# Patient Record
Sex: MALE | Employment: FULL TIME | URBAN - METROPOLITAN AREA
[De-identification: names, ages, dates, MRNs, and addresses within clinical notes are randomized per-mention and may not be internally consistent; named-entity substitution may affect disease eponyms.]

---

## 2017-10-03 ENCOUNTER — TRANSFERRED RECORDS (OUTPATIENT)
Dept: HEALTH INFORMATION MANAGEMENT | Facility: CLINIC | Age: 57
End: 2017-10-03

## 2017-10-18 ENCOUNTER — MEDICAL CORRESPONDENCE (OUTPATIENT)
Dept: HEALTH INFORMATION MANAGEMENT | Facility: CLINIC | Age: 57
End: 2017-10-18

## 2017-10-18 ENCOUNTER — TRANSFERRED RECORDS (OUTPATIENT)
Dept: HEALTH INFORMATION MANAGEMENT | Facility: CLINIC | Age: 57
End: 2017-10-18

## 2017-11-06 ENCOUNTER — OFFICE VISIT (OUTPATIENT)
Dept: NEUROLOGY | Facility: CLINIC | Age: 57
End: 2017-11-06

## 2017-11-06 VITALS
HEIGHT: 67 IN | BODY MASS INDEX: 22.41 KG/M2 | SYSTOLIC BLOOD PRESSURE: 127 MMHG | HEART RATE: 72 BPM | DIASTOLIC BLOOD PRESSURE: 74 MMHG | WEIGHT: 142.8 LBS

## 2017-11-06 DIAGNOSIS — H49.9 OPHTHALMOPLEGIA: Primary | ICD-10-CM

## 2017-11-06 PROCEDURE — 83516 IMMUNOASSAY NONANTIBODY: CPT | Performed by: PSYCHIATRY & NEUROLOGY

## 2017-11-06 PROCEDURE — 83519 RIA NONANTIBODY: CPT | Mod: 91 | Performed by: PSYCHIATRY & NEUROLOGY

## 2017-11-06 PROCEDURE — 84443 ASSAY THYROID STIM HORMONE: CPT | Performed by: PSYCHIATRY & NEUROLOGY

## 2017-11-06 PROCEDURE — 84999 UNLISTED CHEMISTRY PROCEDURE: CPT | Mod: 91 | Performed by: PSYCHIATRY & NEUROLOGY

## 2017-11-06 PROCEDURE — 83519 RIA NONANTIBODY: CPT | Performed by: PSYCHIATRY & NEUROLOGY

## 2017-11-06 PROCEDURE — 86255 FLUORESCENT ANTIBODY SCREEN: CPT | Performed by: PSYCHIATRY & NEUROLOGY

## 2017-11-06 RX ORDER — PYRIDOSTIGMINE BROMIDE 60 MG/1
60 TABLET ORAL SEE ADMIN INSTRUCTIONS
Qty: 90 TABLET | Refills: 1 | Status: SHIPPED | OUTPATIENT
Start: 2017-11-06 | End: 2017-12-12

## 2017-11-06 NOTE — LETTER
11/6/2017       RE: David Vera  [address] [place] [address]     Dear Colleague,    Thank you for referring your patient, David Vera, to the [facility]UR[facility] at [facility]. Please see a copy of my visit note below.    REASON FOR VISIT:   This patient is a 57-year-old right-handed man seen at the request of Dr. Daily for neurologic consultation with chief complaint of bilateral ptosis and vision problems.        HISTORY OF PRESENT ILLNESS:   He reports this is the third episode he has had of these types of symptoms.  The first episode occurred 20 years ago.  He had sinus problems with a droopy right lid.  After sinus surgery, the symptom went away.  Three years ago, he again had droopiness of the right eyelid.  It was felt that he had sinus problems.  He was put on antibiotics and the symptom went away.  The current symptoms began in August.  He had drooping of the right eyelid and then the left eyelid.  He also gets occasional double vision.  He did take Sudafed for a couple of weeks and had a good response but did not care to keep taking the Sudafed because it keeps him up at night.  Now, he has double vision and difficulty seeing because of the droopy eyelids.  He has double vision if he looks right or left.  If he holds his eyes open and looks straight ahead, his vision is fine.  If he uses a cover uncover test, he has no complaints with regard to his vision other than the ptosis.  He has no problem with speech or swallowing or bowel or bladder control.  He has good strength in his arms and legs.  Yesterday he biked 20 miles on his stationary bike in an hour and 15 minutes.  He has no problem coughing.  The symptoms do worsen as the day goes on.      PAST MEDICAL HISTORY:  Largely noncontributory.  He does not have high blood pressure, diabetes, thyroid or asthma.  He has not had pertinent surgery or trauma to the head or neck.  He is not  taking any medications.  He does not drink or smoke.      SOCIAL HISTORY:  He is a .  He is unmarried with 2 children.      FAMILY HISTORY:  Positive for heart disease.      PHYSICAL EXAMINATION:   GENERAL:  The patient is cooperative and in no distress.   VITAL SIGNS:  His blood pressure is 127/74.     There are no carotid bruits but auscultation of the heart shows S1, S2, without murmur, rub or gallop.  Chest is clear to auscultation.  He has excellent cough.     NEUROLOGIC:   The patient is alert, oriented and lucid.  Cranial nerve testing shows full visual fields to confrontation.  Funduscopic exam shows sharp discs bilaterally.  Visual acuity is 20/20 in the right eye and 20/25 in the left eye.  He has almost complete ophthalmoplegia with only minimal horizontal and vertical eye movements.  He has obvious ptosis, right greater than left.  Pupils react to light.  He has excellent strength in the facial muscles.  Palate elevates in the midline and tongue protrudes in the midline.  Motor evaluation shows no pronator drift, normal finger tapping, finger-nose-finger and heel-knee-shin.  He has good strength in the arms and legs.  Muscle stretch reflexes are normal amplitude and symmetric.  Toes are downgoing.  Sensory exam shows preserved vibration and temperature.  Romberg sign is absent.  He can walk on his heels, toes and tandem.      He did have an MRI scan of the brain and orbits at [place].  The report indicates that the intraorbital tissues are normal as well as the brain.  There is a mucus retention cyst in the left frontal sinus.  There is hazy enhancement of the central kelsey consistent with benign capillary telangiectasia.  There was minor gliotic change in the white matter.  He also had lab work performed.  Myasthenia gravis panel was obtained.  However, the results are uninterpretable.  The receptor binding antibody was negative.  The modulating antibody was inconclusive.  The  striational antibody was negative.  This summary report is then it was unable to complete testing due to insufficient quantity or interfering substances and a follow-up test was recommended.      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      DISCUSSION:  This patient is seen for evaluation of bilateral ptosis and also ophthalmoparesis.  He has had 3 such events in the last 20 years and this event has been going on since August.  His exam shows almost complete ophthalmoparesis with obvious ptosis.  However, visual acuity is normal.  Symptoms worsen as the day goes on.  This suggests ocular myasthenia gravis.  I am going to recheck his antibody panel for acetylcholine receptor antibodies.  The original one was inconclusive.  I also have sent the tests requested by the [facility] as a follow-up.  I am going to check a thyroid panel.  I am starting him on Mestinon.  He does have an appointment to see Dr. Micah Higgins in about a month and hopefully that appointment can be moved up.  If his lab testing is inconclusive, he will need to see one of the neuromuscular doctors about further electrodiagnostic testing.  I will hold off on ordering a CT chest to evaluate for thymoma until the lab work comes back.  I did make an appointment to see him in followup in a month pending his other workup.      Ney Salinas MD         D: 2017 15:42   T: 2017 16:16   MT: IVELISSE      Name:     JONNIE CHUN   MRN:      [id number]        Account:      [id number]   :      1960           Service Date: 2017      Document: [id number]       Again, thank you for allowing me to participate in the care of your patient.      Sincerely,    Ney Salinas MD    cc:   Ney Daily MD   [facility]    [address] [place] [address], [address]   [place] [address]

## 2017-11-06 NOTE — PROGRESS NOTES
REASON FOR VISIT:   This patient is a 57-year-old right-handed man seen at the request of Dr. Daily for neurologic consultation with chief complaint of bilateral ptosis and vision problems.        HISTORY OF PRESENT ILLNESS:   He reports this is the third episode he has had of these types of symptoms.  The first episode occurred 20 years ago.  He had sinus problems with a droopy right lid.  After sinus surgery, the symptom went away.  Three years ago, he again had droopiness of the right eyelid.  It was felt that he had sinus problems.  He was put on antibiotics and the symptom went away.  The current symptoms began in August.  He had drooping of the right eyelid and then the left eyelid.  He also gets occasional double vision.  He did take Sudafed for a couple of weeks and had a good response but did not care to keep taking the Sudafed because it keeps him up at night.  Now, he has double vision and difficulty seeing because of the droopy eyelids.  He has double vision if he looks right or left.  If he holds his eyes open and looks straight ahead, his vision is fine.  If he uses a cover uncover test, he has no complaints with regard to his vision other than the ptosis.  He has no problem with speech or swallowing or bowel or bladder control.  He has good strength in his arms and legs.  Yesterday he biked 20 miles on his stationary bike in an hour and 15 minutes.  He has no problem coughing.  The symptoms do worsen as the day goes on.      PAST MEDICAL HISTORY:  Largely noncontributory.  He does not have high blood pressure, diabetes, thyroid or asthma.  He has not had pertinent surgery or trauma to the head or neck.  He is not taking any medications.  He does not drink or smoke.      SOCIAL HISTORY:  He is a .  He is unmarried with 2 children.      FAMILY HISTORY:  Positive for heart disease.      PHYSICAL EXAMINATION:   GENERAL:  The patient is cooperative and in no distress.   VITAL SIGNS:   His blood pressure is 127/74.     There are no carotid bruits but auscultation of the heart shows S1, S2, without murmur, rub or gallop.  Chest is clear to auscultation.  He has excellent cough.     NEUROLOGIC:   The patient is alert, oriented and lucid.  Cranial nerve testing shows full visual fields to confrontation.  Funduscopic exam shows sharp discs bilaterally.  Visual acuity is 20/20 in the right eye and 20/25 in the left eye.  He has almost complete ophthalmoplegia with only minimal horizontal and vertical eye movements.  He has obvious ptosis, right greater than left.  Pupils react to light.  He has excellent strength in the facial muscles.  Palate elevates in the midline and tongue protrudes in the midline.  Motor evaluation shows no pronator drift, normal finger tapping, finger-nose-finger and heel-knee-shin.  He has good strength in the arms and legs.  Muscle stretch reflexes are normal amplitude and symmetric.  Toes are downgoing.  Sensory exam shows preserved vibration and temperature.  Romberg sign is absent.  He can walk on his heels, toes and tandem.      He did have an MRI scan of the brain and orbits at [place].  The report indicates that the intraorbital tissues are normal as well as the brain.  There is a mucus retention cyst in the left frontal sinus.  There is hazy enhancement of the central kelsey consistent with benign capillary telangiectasia.  There was minor gliotic change in the white matter.  He also had lab work performed.  Myasthenia gravis panel was obtained.  However, the results are uninterpretable.  The receptor binding antibody was negative.  The modulating antibody was inconclusive.  The striational antibody was negative.  This summary report is then it was unable to complete testing due to insufficient quantity or interfering substances and a follow-up test was recommended.      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      DISCUSSION:  This  patient is seen for evaluation of bilateral ptosis and also ophthalmoparesis.  He has had 3 such events in the last 20 years and this event has been going on since August.  His exam shows almost complete ophthalmoparesis with obvious ptosis.  However, visual acuity is normal.  Symptoms worsen as the day goes on.  This suggests ocular myasthenia gravis.  I am going to recheck his antibody panel for acetylcholine receptor antibodies.  The original one was inconclusive.  I also have sent the tests requested by the [facility] as a follow-up.  I am going to check a thyroid panel.  I am starting him on Mestinon.  He does have an appointment to see Dr. Micah Higgins in about a month and hopefully that appointment can be moved up.  If his lab testing is inconclusive, he will need to see one of the neuromuscular doctors about further electrodiagnostic testing.  I will hold off on ordering a CT chest to evaluate for thymoma until the lab work comes back.  I did make an appointment to see him in followup in a month pending his other workup.      Amelia Madrid MD      cc:   Amelia Daily MD   [facility]    [address] [place] [address], [address]   [place] [address]         AMELIA MADRID MD             D: 2017 15:42   T: 2017 16:16   MT: IVELISSE      Name:     JONNIE CHUN   MRN:      [id number]        Account:      [id number]   :      1960           Service Date: 2017      Document: [id number]          11/10 addendum: reviewed labs with pt.  Ach R binding justine is positive.  Pt is improved on mestinon.  Will check CT chest.       addendum: reviewed CT chest with pt.  No thymoma and no evidence of active chest disease.  Okay to take two pyridostigmine at once.

## 2017-11-06 NOTE — MR AVS SNAPSHOT
After Visit Summary   11/6/2017    David Vera    MRN: [id number]           Patient Information     Date Of Birth          1960        Visit Information        Provider Department      11/6/2017 2:45 PM Ney Salinas MD [facility] NEUROSPECIALTIES        Today's Diagnoses     Ophthalmoplegia    -  1       Follow-ups after your visit        Follow-up notes from your care team     Return in about 4 weeks (around 12/4/2017).      Your next 10 appointments already scheduled     Dec 04, 2017  1:45 PM CST   Return Visit with Ney Salinas MD   [facility] NEUROSPECIALTIES ([facility])    [address]  [address]  [facility] [address]   [phone]            Dec 04, 2017  4:15 PM CST   (Arrive by 4:00 PM)   New Patient Visit with Carin Tiwari MD   [facility] ([facility])    [address]  4th [address]  [facility] [id number] [phone]            Dec 05, 2017  7:30 AM CST   New Adult Strabismus with Micah Higgins MD   [facility] ([facility])    [address]  [address]  [address]  [facility] [id number] [phone]              Who to contact     Please call your clinic at [phone] to:    Ask questions about your health    Make or cancel appointments    Discuss your medicines    Learn about your test results    Speak to your doctor   If you have compliments or concerns about an experience at your clinic, or if you wish to file a complaint, please contact [facility] Patient Relations at [phone] or email us at [email or web address]         Additional Information About Your Visit        MyChart Information     [employer] is an electronic gateway that provides easy, online access to your medical records. With BookingBug, you can request a clinic appointment, read your test results, renew a prescription or communicate with your care  "team.     To sign up for [employer] visit the website at [email or web address]   You will be asked to enter the access code listed below, as well as some personal information. Please follow the directions to create your username and password.     Your access code is: [email or web address]  Expires: 2018  3:29 PM     Your access code will  in 90 days. If you need help or a new code, please contact your [facility] or call [phone] for assistance.        Care EveryWhere ID     This is your Care EveryWhere ID. This could be used by other organizations to access your [place] medical records  [id number]        Your Vitals Were     Pulse Height BMI (Body Mass Index)             72 5' 7\" (170.2 cm) 22.37 kg/m2          Blood Pressure from Last 3 Encounters:   17 127/74    Weight from Last 3 Encounters:   17 142 lb 12.8 oz (64.8 kg)              We Performed the Following     ACETYLCHOLINE MODULATING ANTIBODY     ACETYLCHOLINE RECEPTOR BINDING     ACETYLCHOLINE RECEPTOR BLOCKING [facility] [address] neuroimmunology antibody f/u (serum): Laboratory Miscellaneous Order     STRIATED MUSCLE ANTIBODY IGG     TSH with free T4 reflex          Today's Medication Changes          These changes are accurate as of: 17  3:29 PM.  If you have any questions, ask your nurse or doctor.               Start taking these medicines.        Dose/Directions    pyridostigmine 60 MG tablet   Commonly known as:  MESTINON   Used for:  Ophthalmoplegia   Started by:  Ney Salinas MD        Dose:  60 mg   Take 1 tablet (60 mg) by mouth See Admin Instructions One po q day for 3 days, then one po bid for 3 days, then one po tid.   Quantity:  90 tablet   Refills:  1            Where to get your medicines      These medications were sent to [id number] - [place], [place] - [address]  [address] [facility] [place] [facility] [place] [address]     Phone:  [phone]     " pyridostigmine 60 MG tablet                Primary Care Provider Office Phone # Fax #    Jamarcus Montano [phone]       [facility] [address]  [facility] [address]        Equal Access to Services     RIZWAN NOVA : Hadii yaya ku hadkimo Soomaali, waaxda luqadaha, qaybta kaalmada adeegyada, brown del vallen jon baird laJosephyllis adams. So [facility] [phone].    ATENCIÓN: Si habla español, tiene a salas disposición servicios gratuitos de asistencia lingüística. Llame al [phone].    We comply with applicable federal civil rights laws and [place] laws. We do not discriminate on the basis of race, color, national origin, age, disability, sex, sexual orientation, or gender identity.            Thank you!     Thank you for choosing [facility]  for your care. Our goal is always to provide you with excellent care. Hearing back from our patients is one way we can continue to improve our services. Please take a few minutes to complete the written survey that you may receive in the mail after your visit with us. Thank you!             Your Updated Medication List - Protect others around you: Learn how to safely use, store and throw away your medicines at www.disposemymeds.org.          This list is accurate as of: 11/6/17  3:29 PM.  Always use your most recent med list.                   Brand Name Dispense Instructions for use Diagnosis    chloroquine 500 MG tablet    ARALEN    8    1 TAB PO Q WEEK start 1 week prior to malaria area and continue until 4 weeks after leaving malaria area        CIPRO 500 MG tablet   Generic drug:  ciprofloxacin     10    1 TAB PO BID prn traveler's diarrhea        pyridostigmine 60 MG tablet    MESTINON    90 tablet    Take 1 tablet (60 mg) by mouth See Admin Instructions One po q day for 3 days, then one po bid for 3 days, then one po tid.    Ophthalmoplegia

## 2017-11-07 LAB — TSH SERPL DL<=0.005 MIU/L-ACNC: 0.94 MU/L (ref 0.4–4)

## 2017-11-08 LAB — STRIA MUS IGG SER QL IF: NORMAL

## 2017-11-09 ENCOUNTER — OFFICE VISIT (OUTPATIENT)
Dept: OPHTHALMOLOGY | Facility: CLINIC | Age: 57
End: 2017-11-09
Attending: OPHTHALMOLOGY
Payer: COMMERCIAL

## 2017-11-09 DIAGNOSIS — H02.403 PTOSIS OF BOTH EYELIDS: Primary | ICD-10-CM

## 2017-11-09 DIAGNOSIS — H53.2 DIPLOPIA: ICD-10-CM

## 2017-11-09 DIAGNOSIS — H50.05 ALTERNATING ESOTROPIA: ICD-10-CM

## 2017-11-09 LAB
ACHR BIND AB SER-SCNC: 0.7 NMOL/L (ref 0–0.4)
ACHR MOD AB/ACHR TOTAL SFR SER: 8 %

## 2017-11-09 PROCEDURE — 99215 OFFICE O/P EST HI 40 MIN: CPT | Performed by: TECHNICIAN/TECHNOLOGIST

## 2017-11-09 PROCEDURE — 92060 SENSORIMOTOR EXAMINATION: CPT | Mod: ZF | Performed by: OPHTHALMOLOGY

## 2017-11-09 ASSESSMENT — VISUAL ACUITY
OD_SC: 20/25
METHOD: SNELLEN - LINEAR
OS_SC+: -2
OD_SC+: -2
OS_SC: 20/30

## 2017-11-09 ASSESSMENT — CONF VISUAL FIELD
OS_NORMAL: 1
OD_NORMAL: 1
METHOD: COUNTING FINGERS

## 2017-11-09 ASSESSMENT — TONOMETRY
IOP_METHOD: ICARE
OD_IOP_MMHG: 13
OS_IOP_MMHG: 14

## 2017-11-09 NOTE — NURSING NOTE
Chief Complaints and History of Present Illnesses   Patient presents with     Neurologic Evaluation     ptosis, double vision     HPI    Symptoms:              Comments:  New patient referred by Ney Salinas MD.    3 events of ptosis that patient has had in the last 20 years and this event has been going on since August 2017. Symptoms worsen as the day goes on.   -H/o acetylcholine testing but they were inconclusive. Dr. Salinas is ordering to recheck his antibody panel for acetylcholine receptor antibodies and a thyroid panel.    -Dr. Salinas has started patient on Mestinon.    -Per Dr. Salinas, if his lab testing is inconclusive, he will need to see one of the neuromuscular doctors about further electrodiagnostic testing.   -Patient states this is his 4th day taking Mestinon. He feels Mestinon is helping the ptosis and keeping his eyes open throughout the day without drooping.   -Patient reports vertical diplopia prior to taking Mestinon and now he feels after taking Mestinon, he's complaining of horizontal diplopia. Patient wears patch to alleviate diplopia.   MARTHA Lopez 11/9/2017 11:49 AM

## 2017-11-09 NOTE — PROGRESS NOTES
About 20 years ago developed double vision.  He had sinus surgery and his double vision went away.  3 years ago he developed double vision again.  He was treated with antibiotics and his double vision resolved.  This year, his double vision recurred and sudafed relieved his double vision.  His double vision came back soon thereafter.      He endorses ptosis Right upper eyelid in September.  About 2-3 weeks ago his left upper eyelid became ptotic. His double vision and ptosis are better in the am.  He has been taking mestinon for the past 4 days and feels like this has made him better.  His acetylcholine receptor binding antibody test from [facility] on 10/20/17 was 0.             Assessment & Plan     David Vera is a 57 year old male with the following diagnoses:   1. Ptosis of both eyelids    2. Diplopia    3. Alternating esotropia       He has severe ptosis and ophthalmoplegia.  The differential diagnosis includes myasthenia gravis, Elizondo Rios syndrome, B1 deficiency, CPEO.  An ice test was performed and this showed negative results.             His acetylcholine receptor binding antibody was 0 at [facility].  I reviewed his MRI images personally and this showed normal extraocular muscles.  His rest test was grossly positive. This is most consistent with myasthenia gravis.  He is trying mestinon right now.  He says it helps quite a bit.  He is currently on 60 mg twice a day.  Will ask him to increase to three times a day and then four times a day.  If this is not beneficial, then will try prednisone 10 mg/d x 2 days then 50 mg/d.           Attending Physician Attestation:  Complete documentation of historical and exam elements from today's encounter can be found in the full encounter summary report (not reduplicated in this progress note).  I personally obtained the chief complaint(s) and history of present illness.  I confirmed and edited as necessary the review of systems, past medical/surgical history,  family history, social history, and examination findings as documented by others; and I examined the patient myself.  I personally reviewed the relevant tests, images, and reports as documented above.  I formulated and edited as necessary the assessment and plan and discussed the findings and management plan with the patient and family. - Micah Higgins MD

## 2017-11-09 NOTE — MR AVS SNAPSHOT
After Visit Summary   11/9/2017    David Vera    MRN: [id number]           Patient Information     Date Of Birth          1960        Visit Information        Provider Department      11/9/2017 11:00 AM Micah [facility]gins MD [facility]        Today's Diagnoses     Ptosis of both eyelids    -  1    Diplopia        Alternating esotropia           Follow-ups after your visit        Your next 10 appointments already scheduled     Dec 04, 2017  1:45 PM CST   Return Visit with Ney Salinas MD   [facility] NEUROSPECIALTIES ([facility])    [address]  [address]  [place] [address]   [phone]            Dec 04, 2017  4:15 PM CST   (Arrive by 4:00 PM)   New Patient Visit with Carin Tiwari MD   [facility] Ear Nose and Throat ([facility])    [address] [place] [address] [phone]              Who to contact     Please call your clinic at [phone] to:    Ask questions about your health    Make or cancel appointments    Discuss your medicines    Learn about your test results    Speak to your doctor   If you have compliments or concerns about an experience at your clinic, or if you wish to file a complaint, please contact [facility] Patient Relations at [phone] or email us at [email or web address]         Additional Information About Your Visit        MyChart Information     SkyDoxt is an electronic gateway that provides easy, online access to your medical records. With Tricycle, you can request a clinic appointment, read your test results, renew a prescription or communicate with your care team.     To sign up for SkyDoxt visit the website at [email or web address]   You will be asked to enter the access code listed below, as well as some personal information. Please follow the directions to create your username and password.     Your access code is:  [email or web address]  Expires: 2018  3:29 PM     Your access code will  in 90 days. If you need help or a new code, please contact your [facility] or call [phone] for assistance.        Care EveryWhere ID     This is your Care EveryWhere ID. This could be used by other organizations to access your [place] medical records  [id number]         Blood Pressure from Last 3 Encounters:   17 127/74    Weight from Last 3 Encounters:   17 64.8 kg (142 lb 12.8 oz)              We Performed the Following     Sensorimotor          Today's Medication Changes          These changes are accurate as of: 17 12:35 PM.  If you have any questions, ask your nurse or doctor.               Stop taking these medicines if you haven't already. Please contact your care team if you have questions.     chloroquine 500 MG tablet   Commonly known as:  ARALEN           CIPRO 500 MG tablet   Generic drug:  ciprofloxacin                    Primary Care Provider Office Phone # Fax #    Jamarcus Montano [phone]       [facility] [address] [facility] [address]        Equal Access to Services     [facility] AH: Hadii yaya ku hadasho Soinga, waaxda luqadaha, qaybta kaalmada cindy, brown agustin . So [facility] [phone].    ATENCIÓN: Si habla español, tiene a salas disposición servicios gratuitos de asistencia lingüística. Llame al [phone].    We comply with applicable federal civil rights laws and [place] laws. We do not discriminate on the basis of race, color, national origin, age, disability, sex, sexual orientation, or gender identity.            Thank you!     Thank you for choosing EYE CLINIC  for your care. Our goal is always to provide you with excellent care. Hearing back from our patients is one way we can continue to improve our services. Please take a few minutes to complete the written survey that you may receive in  the mail after your visit with us. Thank you!             Your Updated Medication List - Protect others around you: Learn how to safely use, store and throw away your medicines at www.disposemymeds.org.          This list is accurate as of: 11/9/17 12:35 PM.  Always use your most recent med list.                   Brand Name Dispense Instructions for use Diagnosis    pyridostigmine 60 MG tablet    MESTINON    90 tablet    Take 1 tablet (60 mg) by mouth See Admin Instructions One po q day for 3 days, then one po bid for 3 days, then one po tid.    Ophthalmoplegia

## 2017-11-09 NOTE — LETTER
2017         RE:  :  MRN: David Vera  1960  [id number]     Dear Dr. Montano,    Thank you for asking me to see your very pleasant patient, David Vera, in neuro-ophthalmic consultation.  I would like to thank you for sending your records and I have summarized them in the history of present illness.  My assessment and plan are below.  For further details, please see my attached clinic note.      Assessment & Plan     David Vera is a 57 year old male with the following diagnoses:   1. Ptosis of both eyelids    2. Diplopia    3. Alternating esotropia       He has severe ptosis and ophthalmoplegia.  The differential diagnosis includes myasthenia gravis, Elizondo Rios syndrome, B1 deficiency, CPEO.  An ice test was performed and this showed negative results.             His acetylcholine receptor binding antibody was 0 at [facility].  I reviewed his MRI images personally and this showed normal extraocular muscles.  His rest test was grossly positive. This is most consistent with myasthenia gravis.  He is trying mestinon right now.  He says it helps quite a bit.  He is currently on 60 mg twice a day.  Will ask him to increase to three times a day and then four times a day.  If this is not beneficial, then will try prednisone 10 mg/d x 2 days then 50 mg/d.      Again, thank you for allowing me to participate in the care of your patient.      Sincerely,    Micah Higgins MD  Professor, Neuro-Ophthalmology  Department of Ophthalmology and Visual Neurosciences  [facility]        CC: Jamarcus Montano MD  [facility]  [address]  [facility] [address]  VIA Facsimile: [phone]     Ney Daily MD  [facility]  [address]  [place] [address]  VIA Facsimile: [phone]     Ney Salinas MD  [address] [place] [address] [address]  [place] [address]  VIA In Basket

## 2017-11-10 DIAGNOSIS — G70.00 OCULAR MYASTHENIA (H): Primary | ICD-10-CM

## 2017-11-11 LAB — ACHR BLOCK AB/ACHR TOTAL SFR SER: 13 % (ref 0–26)

## 2017-11-13 ENCOUNTER — TELEPHONE (OUTPATIENT)
Dept: NEUROLOGY | Facility: CLINIC | Age: 57
End: 2017-11-13

## 2017-11-13 NOTE — TELEPHONE ENCOUNTER
----- Message from Matt Gracia sent at 11/13/2017  8:26 AM CST -----  Pt had CT done on Saturday.  ----- Message -----     From: Kelly Ledesma, RN     Sent: 11/10/2017  10:27 AM       To: Clinic [id number]    Please help arrange CT chest as ordered by Rita.     Thanks,  marques  ----- Message -----     From: Ney Salinas MD     Sent: 11/10/2017  10:22 AM       To: Kelly Ledesma, RN    I ordered CT chest on this pt. Please help arrange. jf

## 2017-11-14 ENCOUNTER — CARE COORDINATION (OUTPATIENT)
Dept: NEUROLOGY | Facility: CLINIC | Age: 57
End: 2017-11-14

## 2017-11-15 ENCOUNTER — TELEPHONE (OUTPATIENT)
Dept: NEUROLOGY | Facility: CLINIC | Age: 57
End: 2017-11-15

## 2017-11-15 DIAGNOSIS — M62.81 GENERALIZED MUSCLE WEAKNESS: Primary | ICD-10-CM

## 2017-11-15 NOTE — TELEPHONE ENCOUNTER
----- Message from Ney Salinas MD sent at 11/15/2017  1:08 PM CST -----  EMG and neuromuscular consult. Pt should be seen within a month. jf

## 2017-11-15 NOTE — PROGRESS NOTES
Pt called with concerns about weakness lifting weights.  Not weak when running.  He thinks it is worse since starting pyridostigmine.  Pyridostigmine is helping ptosis and eoms.  Will arrange for EMG and neuromuscular consult.

## 2017-11-21 LAB
RESULT: NORMAL
SEND OUTS MISC TEST CODE: NORMAL
SEND OUTS MISC TEST SPECIMEN: NORMAL
TEST NAME: NORMAL

## 2017-11-22 NOTE — TELEPHONE ENCOUNTER
APPT INFO    Date /Time: 12/4/17 at 4:15PM   Reason for Appt: Ptosis of right eyelid, ocular palsy, chronic sinusitis, muococele of frontal sinus   Ref Provider/Clinic: Ney Daily @ [facility]   Are there internal records? If yes, list: No   Patient Contact (Y/N) & Call Details: No, referred   Action: Faxed records request to [facility].     OUTSIDE RECORDS CHECKLIST     CLINIC NAME COMMENTS REC (x) IMG (x)   [facility] In Epic, Image in PACS X X

## 2017-11-27 ENCOUNTER — MYC MEDICAL ADVICE (OUTPATIENT)
Dept: NEUROLOGY | Facility: CLINIC | Age: 57
End: 2017-11-27

## 2017-11-27 NOTE — TELEPHONE ENCOUNTER
Reviewed clinical status with pt.  He is having more weakness. Will see in the next day or so for assessment.

## 2017-11-28 ENCOUNTER — OFFICE VISIT (OUTPATIENT)
Dept: NEUROLOGY | Facility: CLINIC | Age: 57
End: 2017-11-28
Payer: COMMERCIAL

## 2017-11-28 VITALS
SYSTOLIC BLOOD PRESSURE: 111 MMHG | HEIGHT: 67 IN | BODY MASS INDEX: 22.05 KG/M2 | OXYGEN SATURATION: 99 % | DIASTOLIC BLOOD PRESSURE: 69 MMHG | WEIGHT: 140.5 LBS | HEART RATE: 63 BPM

## 2017-11-28 DIAGNOSIS — G70.01 MYASTHENIA GRAVIS WITH EXACERBATION (H): Primary | ICD-10-CM

## 2017-11-28 PROCEDURE — 99214 OFFICE O/P EST MOD 30 MIN: CPT | Performed by: PSYCHIATRY & NEUROLOGY

## 2017-11-28 RX ORDER — PREDNISONE 10 MG/1
10 TABLET ORAL SEE ADMIN INSTRUCTIONS
Qty: 120 TABLET | Refills: 0 | Status: SHIPPED | OUTPATIENT
Start: 2017-11-28 | End: 2018-01-09

## 2017-11-28 ASSESSMENT — PAIN SCALES - GENERAL: PAINLEVEL: NO PAIN (0)

## 2017-11-28 NOTE — NURSING NOTE
"David Vera's goals for this visit include: return  He requests these members of his care team be copied on today's visit information:     PCP: Jamarcus Montano    Referring Provider:  No referring provider defined for this encounter.    Chief Complaint   Patient presents with     RECHECK       Initial /69  Pulse 63  Ht 1.702 m (5' 7\")  Wt 63.7 kg (140 lb 8 oz)  SpO2 99%  BMI 22.01 kg/m2 Estimated body mass index is 22.01 kg/(m^2) as calculated from the following:    Height as of this encounter: 1.702 m (5' 7\").    Weight as of this encounter: 63.7 kg (140 lb 8 oz).  Medication Reconciliation: complete    Do you need any medication refills at today's visit? ?  "

## 2017-11-28 NOTE — MR AVS SNAPSHOT
After Visit Summary   11/28/2017    David Vera    MRN: [id number]           Patient Information     Date Of Birth          1960        Visit Information        Provider Department      11/28/2017 11:00 AM Ney Salinas MD [facility]        Today's Diagnoses     Myasthenia gravis with exacerbation (H)    -  1      Care Instructions    Do not take Mestinon 24 hours prior to EMG.     Only take 1 tablet of Prednisone until you see Dr Salinas on Monday. Disregard the instructions stated on the bottle.     If your symptoms change or worsen, go to the nearest ED to be evaluated.           Follow-ups after your visit        Follow-up notes from your care team     Return if symptoms worsen or fail to improve.      Your next 10 appointments already scheduled     Dec 04, 2017  1:45 PM CST   Return Visit with Ney Salinas MD   [facility] NEUROSPECIALTIES ([facility])    [address]  [address] [place] [id number] [phone]            Dec 11, 2017  3:00 PM CST   (Arrive by 2:45 PM)   EMG with Go Jewell MD   [facility] EMG ([facility])    [address] [id number] [phone]           Do not use lotions or creams on the area to be tested. If you are on blood thinners (Warfarin, Coumadin, Lovenox, etc), please contact your primary care physician to check if it is safe to stop them 3 days prior to testing. If you have anxiety, please check with your referring physician to obtain anti-anxiety medication for the procedure.            Dec 19, 2017  3:00 PM CST   New Muscular Dystrophy with Go Jewell MD   [facility] NEUROSPECIALTIES ([facility])    [address]  [address]  [facility] [id number] [phone]              Who to contact     If you have questions or need follow up information about today's clinic visit or your schedule please contact [facility]  [facility] directly at [phone].  Normal or non-critical lab and imaging results will be communicated to you by [employer], letter or phone within 4 business days after the clinic has received the results. If you do not hear from us within 7 days, please contact the clinic through [employer] or phone. If you have a critical or abnormal lab result, we will notify you by phone as soon as possible.  Submit refill requests through [employer] or call your pharmacy and they will forward the refill request to us. Please allow 3 business days for your refill to be completed.          Additional Information About Your Visit        [employer] Information     [employer] gives you secure access to your electronic health record. If you see a primary care provider, you can also send messages to your care team and make appointments. If you have questions, please call your primary care clinic.  If you do not have a primary care provider, please call [phone] and they will assist you.      [employer] is an electronic gateway that provides easy, online access to your medical records. With [employer], you can request a clinic appointment, read your test results, renew a prescription or communicate with your care team.     To access your existing account, please contact your [facility] or call [phone] for assistance.        Care EveryWhere ID     This is your Care EveryWhere ID. This could be used by other organizations to access your [place] medical records  [id number]        Your Vitals Were     Pulse Height Pulse Oximetry BMI (Body Mass Index)          63 5' 7\" (1.702 m) 99% 22.01 kg/m2         Blood Pressure from Last 3 Encounters:   11/28/17 111/69   11/06/17 127/74    Weight from Last 3 Encounters:   11/28/17 140 lb 8 oz (63.7 kg)   11/06/17 142 lb 12.8 oz (64.8 kg)              Today, you had the following     No orders found for display         Today's Medication Changes          These changes " are accurate as of: 11/28/17 12:26 PM.  If you have any questions, ask your nurse or doctor.               Start taking these medicines.        Dose/Directions    predniSONE 10 MG tablet   Commonly known as:  DELTASONE   Used for:  Myasthenia gravis with exacerbation (H)   Started by:  Ney Salinas MD        Dose:  10 mg   Take 1 tablet (10 mg) by mouth See Admin Instructions One po q day for 3 days, then two po q day for 3 days, then 3 po q day for 3 days, then 4 po q day.   Quantity:  120 tablet   Refills:  0         These medicines have changed or have updated prescriptions.        Dose/Directions    pyridostigmine 60 MG tablet   Commonly known as:  MESTINON   This may have changed:    - how much to take  - when to take this  - additional instructions   Used for:  Ophthalmoplegia        Dose:  60 mg   Take 1 tablet (60 mg) by mouth See Admin Instructions One po q day for 3 days, then one po bid for 3 days, then one po tid.   Quantity:  90 tablet   Refills:  1            Where to get your medicines      Some of these will need a paper prescription and others can be bought over the counter.  Ask your nurse if you have questions.     Bring a paper prescription for each of these medications     predniSONE 10 MG tablet                Primary Care Provider Office Phone # Fax #    Jamarcus Montano [phone]       [facility] [address] [facility] [address]        Equal Access to Services     [facility] ROHINI AH: Hadpito ramirezo Soinga, waaxda luqadaha, qaybta kaalmada cindy, brown adams. So [facility] [phone].    ATENCIÓN: Si habla español, tiene a salas disposición servicios gratuitos de asistencia lingüística. Llame al [phone].    We comply with applicable federal civil rights laws and [place] laws. We do not discriminate on the basis of race, color, national origin, age, disability, sex, sexual orientation, or gender identity.             Thank you!     Thank you for choosing [facility]  for your care. Our goal is always to provide you with excellent care. Hearing back from our patients is one way we can continue to improve our services. Please take a few minutes to complete the written survey that you may receive in the mail after your visit with us. Thank you!             Your Updated Medication List - Protect others around you: Learn how to safely use, store and throw away your medicines at www.disposemymeds.org.          This list is accurate as of: 11/28/17 12:26 PM.  Always use your most recent med list.                   Brand Name Dispense Instructions for use Diagnosis    predniSONE 10 MG tablet    DELTASONE    120 tablet    Take 1 tablet (10 mg) by mouth See Admin Instructions One po q day for 3 days, then two po q day for 3 days, then 3 po q day for 3 days, then 4 po q day.    Myasthenia gravis with exacerbation (H)       pyridostigmine 60 MG tablet    MESTINON    90 tablet    Take 1 tablet (60 mg) by mouth See Admin Instructions One po q day for 3 days, then one po bid for 3 days, then one po tid.    Ophthalmoplegia

## 2017-11-28 NOTE — LETTER
11/28/2017        RE: David Vera  [address] [place] [address]        NEUROLOGY CONSULTATION       HISTORY OF PRESENT ILLNESS:  David Vera is seen in followup with weakness.  I initially saw him about 3 weeks ago.  At that time his concern was ophthalmoplegia with ptosis.  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  He was started on prednisone.  He did see Dr. Micah Higgins who agreed with that diagnosis.  His symptoms have worsened.  Now he has weakness in his right hand.  He can no longer snap his fingers.  He also has lost some control of his tongue, and he notices it when eating.  He also feels weak in his arms.      PHYSICAL EXAMINATION:   GENERAL:  The patient is cooperative and in no distress.   VITAL SIGNS:  His blood pressure is 111/69.     RESPIRATORY:  He can give a good cough.  He could easily count to 32 on a single breath.     NEUROLOGIC:  He has ptosis  bilaterally.  He has ophthalmoparesis.  He has best preservation of abduction in the left eye but otherwise ocular movements are limited.  He has weakness of orbicularis oculi, but fairly well-preserved strength of orbicularis oris. He does have fairly well-preserved strength in the tongue muscles bilaterally.  He has good strength in neck flexion and extension.  He has good strength in shoulder abduction and arm flexion but is quite weak and arm extension, graded at 3.  He has normal strength distally in the hands.  He has good strength in the legs and feet, though he has some difficulty walking on his right heel, keeping the toe in the air.  He can perform a partial genu flexion on each leg individually.  Reflexes are present and of normal amplitude.  Toes are downgoing.      LABORATORY DATA:  His lab work does show abnormal antibodies in that he has an elevated acetylcholine receptor modulating antibody at 53, with normal being less than 20%, and his binding antibody was 0.7, normal being less than 0.4.  The  blocking antibody was negative as was the striated muscle antibody.      ASSESSMENT:  Myasthenia gravis, initially ocular but now generalized.      DISCUSSION:  This patient is seen in followup with worsening weakness consistent with generalized myasthenia gravis.  At this point I am going to start him on prednisone 10 mg daily.  No change will be made in his Mestinon.  He does have an appointment to see Dr. Jewell in a couple of weeks for EMG and then consultation.  I did advise the patient that if he had any worsening weakness or problems with swallowing, he needed to go to the Emergency Room on the [place].  He is going to start staying with someone so he is not alone for prolonged periods of time.  I will see him in followup next week for reexamination.            This was a 25-minute appointment, more than half of which was spent in counseling and coordination of care.         AMELIA SALINAS MD             D: 2017 13:09   T: 2017 20:01   MT:       Name:     JONNIE CHUN   MRN:      [id number]        Account:      [id number]   :      1960           Visit Date:   2017      Document: [id number]          Sincerely,        Amelia Salinas MD

## 2017-11-29 NOTE — PROGRESS NOTES
NEUROLOGY CONSULTATION       HISTORY OF PRESENT ILLNESS:  David Vera is seen in followup with weakness.  I initially saw him about 3 weeks ago.  At that time his concern was ophthalmoplegia with ptosis.  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  He was started on prednisone.  He did see Dr. Micah Higgins who agreed with that diagnosis.  His symptoms have worsened.  Now he has weakness in his right hand.  He can no longer snap his fingers.  He also has lost some control of his tongue, and he notices it when eating.  He also feels weak in his arms.      PHYSICAL EXAMINATION:   GENERAL:  The patient is cooperative and in no distress.   VITAL SIGNS:  His blood pressure is 111/69.     RESPIRATORY:  He can give a good cough.  He could easily count to 32 on a single breath.     NEUROLOGIC:  He has ptosis  bilaterally.  He has ophthalmoparesis.  He has best preservation of abduction in the left eye but otherwise ocular movements are limited.  He has weakness of orbicularis oculi, but fairly well-preserved strength of orbicularis oris. He does have fairly well-preserved strength in the tongue muscles bilaterally.  He has good strength in neck flexion and extension.  He has good strength in shoulder abduction and arm flexion but is quite weak and arm extension, graded at 3.  He has normal strength distally in the hands.  He has good strength in the legs and feet, though he has some difficulty walking on his right heel, keeping the toe in the air.  He can perform a partial genu flexion on each leg individually.  Reflexes are present and of normal amplitude.  Toes are downgoing.      LABORATORY DATA:  His lab work does show abnormal antibodies in that he has an elevated acetylcholine receptor modulating antibody at 53, with normal being less than 20%, and his binding antibody was 0.7, normal being less than 0.4.  The blocking antibody was negative as was the striated muscle antibody.      ASSESSMENT:  Myasthenia  gravis, initially ocular but now generalized.      DISCUSSION:  This patient is seen in followup with worsening weakness consistent with generalized myasthenia gravis.  At this point I am going to start him on prednisone 10 mg daily.  No change will be made in his Mestinon.  He does have an appointment to see Dr. Jewell in a couple of weeks for EMG and then consultation.  I did advise the patient that if he had any worsening weakness or problems with swallowing, he needed to go to the Emergency Room on the [place].  He is going to start staying with someone so he is not alone for prolonged periods of time.  I will see him in followup next week for reexamination.            This was a 25-minute appointment, more than half of which was spent in counseling and coordination of care.         AMELIA MADRID MD             D: 2017 13:09   T: 2017 20:01   MT: MELISSA      Name:     JONNIE CHUN   MRN:      [id number]        Account:      [id number]   :      1960           Visit Date:   2017      Document: [id number]

## 2017-12-01 NOTE — TELEPHONE ENCOUNTER
Note:   [facility] are already scanned in epic.  -10/18/17 office note from Dr. Daily  -10/3/17 MRI Brain (img requested from [facility])  -10/18/17 Audio

## 2017-12-04 ENCOUNTER — OFFICE VISIT (OUTPATIENT)
Dept: NEUROLOGY | Facility: CLINIC | Age: 57
End: 2017-12-04

## 2017-12-04 ENCOUNTER — PRE VISIT (OUTPATIENT)
Dept: OTOLARYNGOLOGY | Facility: CLINIC | Age: 57
End: 2017-12-04

## 2017-12-04 VITALS
WEIGHT: 141.6 LBS | HEIGHT: 67 IN | SYSTOLIC BLOOD PRESSURE: 140 MMHG | BODY MASS INDEX: 22.22 KG/M2 | HEART RATE: 72 BPM | DIASTOLIC BLOOD PRESSURE: 72 MMHG

## 2017-12-04 DIAGNOSIS — G70.01 MYASTHENIA GRAVIS WITH EXACERBATION (H): Primary | ICD-10-CM

## 2017-12-04 NOTE — LETTER
2017       RE: David Vera  [address] [place] [address]     Dear Colleague,    Thank you for referring your patient, David Vera, to the [facility] NEUROSPECIALTIES at [facility]. Please see a copy of my visit note below.    2017      RE: David Vera   MRN: [id number]   : 1960      Dear Dr. Daily:      This patient is seen in followup with myasthenia gravis.  He is here with his friend, Ashley Claudio.  He reports no major change in his neurologic status compared to last week.  When I saw him last week, I started him on prednisone 10 mg a day.  He says it does give him a lift.  It helps for a few hours.  His vision is better.  However, he continues to have symptoms.  His speech slurs when he is tired.  He notices it on awaking and also late in the day.  He can sometimes lose control of his tongue and has to use his finger to control the food in his mouth.  He is weak when he is trying to wash his hair.  He does not have issues of low back pain, although that has been a problem in the past.  He is on Mestinon 2 tablets 3 times a day in addition to the prednisone.  He is not sure the Mestinon is helping him at all.      On exam today, the patient is cooperative and in no distress.  His blood pressure is 140/72.  He has an excellent cough.  He can count to 33 in a single breath.  He has obvious ophthalmoparesis.  He has more movement in the left eye in the horizontal gaze than in the right eye but has poor vertical gaze.  He has weakness of orbicularis oculi graded at about 3.  He has fairly good strength of orbicularis oris.  He has fairly good tongue strength bilaterally.  He has normal or near normal strength in shoulder abduction, wrist extension, finger extension and interosseous muscles.  Triceps is graded at about 3, which was last week.  Biceps may be slightly reduced at 4+.  In the legs, I could not detect weakness  proximally or distally.  He can walk on his toes but has a difficult time walking on the right heel and Ashley has noticed that he seems to swing his right leg when he walks.  He can get up on a stair without difficulty and has good strength in the hamstrings bilaterally.      ASSESSMENT:  Generalized myasthenia gravis.      DISCUSSION:  This patient is seen in followup with myasthenia gravis.  His symptoms are worsening, but his exam looks pretty much the same as it did last week.  I had a long talk with the patient and Ashley about his diagnosis and treatment.  Right now we are trying to get him on prednisone.  He has an appointment to see Dr. Jewell next week for electrodiagnosis and the following week for consultation.  I am going to increase his prednisone to 20 mg a day.  I suggested he cut his Mestinon dose to 1 tablet 3 times a day to see if there is any worsening.  If the symptoms get worse, he should resume 2 tablets 3 times a day.  If he does not notice any deterioration, then he could consider tapering off the Mestinon altogether to see if that makes it worse.  He says that it helped for the first week or so that he took it but does not think it has been helping much since then.      There were several questions about short and long-term management of myasthenia I discussed with the patient and Ashley.  These issues will be addressed in greater detail by Dr. Jewell.  I did not make an appointment to see the patient in followup but will await Dr. Jewell's consultation.     Again I discussed with the pt that he could experience acute worsening of his condition with swallowing or shortness of breath, in which case he should go the the ED at the [facility].  It would also be preferable if he was with someone for part or all of the day and night, in case he did get into distress.       Sincerely,      Ney Salinas MD      This was a 25-minute appointment more than half of which was spent in counseling and  coordination of care.         NEY SALINAS MD             D: 2017 14:23   T: 2017 14:51   MT: neeta      Name:     JONNIE CHUN   MRN:      [id number]        Account:      [id number]   :      1960           Service Date: 2017      Document: [id number]        Again, thank you for allowing me to participate in the care of your patient.      Sincerely,    Ney Salinas MD    CC:  Ney Daily MD   [facility] [address]  [address]

## 2017-12-04 NOTE — MR AVS SNAPSHOT
After Visit Summary   12/4/2017    David Vera    MRN: [id number]           Patient Information     Date Of Birth          1960        Visit Information        Provider Department      12/4/2017 1:45 PM Ney Salinas MD [facility] NEUROSPECIALTIES        Today's Diagnoses     Myasthenia gravis with exacerbation (H)    -  1       Follow-ups after your visit        Follow-up notes from your care team     Return if symptoms worsen or fail to improve.      Your next 10 appointments already scheduled     Dec 11, 2017  3:00 PM CST   (Arrive by 2:45 PM)   EMG with oG Jewell MD   [facility] EMG ([facility])    [address]  3rd Floor  [place] [address] [phone]           Do not use lotions or creams on the area to be tested. If you are on blood thinners (Warfarin, Coumadin, Lovenox, etc), please contact your primary care physician to check if it is safe to stop them 3 days prior to testing. If you have anxiety, please check with your referring physician to obtain anti-anxiety medication for the procedure.            Dec 19, 2017  3:00 PM CST   New Muscular Dystrophy with Go Jewell MD   [facility] NEUROSPECIALTIES ([facility])    [address] [address] [phone]              Who to contact     Please call your clinic at [phone] to:    Ask questions about your health    Make or cancel appointments    Discuss your medicines    Learn about your test results    Speak to your doctor   If you have compliments or concerns about an experience at your clinic, or if you wish to file a complaint, please contact [facility] Patient Relations at [phone] or email us at [email or web address]         Additional Information About Your Visit        MyChart Information     [employer] gives you secure access to your electronic health record. If you see a primary care  "provider, you can also send messages to your care team and make appointments. If you have questions, please call your primary care clinic.  If you do not have a primary care provider, please call [phone] and they will assist you.      [employer] is an electronic gateway that provides easy, online access to your medical records. With [employer] [facility], you can request a clinic appointment, read your test results, renew a prescription or communicate with your care team.     To access your existing account, please contact your [facility] or call [phone] for assistance.        Care EveryWhere ID     This is your Care EveryWhere ID. This could be used by other organizations to access your [facility] medical records  [id number]        Your Vitals Were     Pulse Height BMI (Body Mass Index)             72 5' 7\" (170.2 cm) 22.18 kg/m2          Blood Pressure from Last 3 Encounters:   12/04/17 140/72   11/28/17 111/69   11/06/17 127/74    Weight from Last 3 Encounters:   12/04/17 141 lb 9.6 oz (64.2 kg)   11/28/17 140 lb 8 oz (63.7 kg)   11/06/17 142 lb 12.8 oz (64.8 kg)              Today, you had the following     No orders found for display         Today's Medication Changes          These changes are accurate as of: 12/4/17  2:26 PM.  If you have any questions, ask your nurse or doctor.               These medicines have changed or have updated prescriptions.        Dose/Directions    pyridostigmine 60 MG tablet   Commonly known as:  MESTINON   This may have changed:    - how much to take  - when to take this  - additional instructions   Used for:  Ophthalmoplegia        Dose:  60 mg   Take 1 tablet (60 mg) by mouth See Admin Instructions One po q day for 3 days, then one po bid for 3 days, then one po tid.   Quantity:  90 tablet   Refills:  1                Primary Care Provider Office Phone # Fax #    Jamarcus Montano [phone]       [facility] [address] [place] [place] [address]        Equal Access to Services     [facility] ROHINI : Hadii yaya kern ashleyjadyn Soenali, wajuliada luqadaha, qaybta kaalejandrobrown gravesjenniferhank adams. So [facility] [phone].    ATENCIÓN: Si habla español, tiene a saals disposición servicios gratuitos de asistencia lingüística. Llame al [phone].    We comply with applicable federal civil rights laws and [place] laws. We do not discriminate on the basis of race, color, national origin, age, disability, sex, sexual orientation, or gender identity.            Thank you!     Thank you for choosing [facility] NEUROSPECIALTIES  for your care. Our goal is always to provide you with excellent care. Hearing back from our patients is one way we can continue to improve our services. Please take a few minutes to complete the written survey that you may receive in the mail after your visit with us. Thank you!             Your Updated Medication List - Protect others around you: Learn how to safely use, store and throw away your medicines at www.disposemymeds.org.          This list is accurate as of: 12/4/17  2:26 PM.  Always use your most recent med list.                   Brand Name Dispense Instructions for use Diagnosis    predniSONE 10 MG tablet    DELTASONE    120 tablet    Take 1 tablet (10 mg) by mouth See Admin Instructions One po q day for 3 days, then two po q day for 3 days, then 3 po q day for 3 days, then 4 po q day.    Myasthenia gravis with exacerbation (H)       pyridostigmine 60 MG tablet    MESTINON    90 tablet    Take 1 tablet (60 mg) by mouth See Admin Instructions One po q day for 3 days, then one po bid for 3 days, then one po tid.    Ophthalmoplegia

## 2017-12-04 NOTE — TELEPHONE ENCOUNTER
Received Imaging From: [facility] Medical Imaging    Image Type (x): Disc:_____  Pacs:_X____      Exam Date/Name: MRI Brain 10/3/17 Comments:

## 2017-12-04 NOTE — PROGRESS NOTES
2017         Ney Daily MD   [facility]   [address]   [place]  [address]      RE: David Vera   MRN: [id number]   : 1960      Dear Dr. Daily:      This patient is seen in followup with myasthenia gravis.  He is here with his friend, Ashley Claudio.  He reports no major change in his neurologic status compared to last week.  When I saw him last week, I started him on prednisone 10 mg a day.  He says it does give him a lift.  It helps for a few hours.  His vision is better.  However, he continues to have symptoms.  His speech slurs when he is tired.  He notices it on awaking and also late in the day.  He can sometimes lose control of his tongue and has to use his finger to control the food in his mouth.  He is weak when he is trying to wash his hair.  He does not have issues of low back pain, although that has been a problem in the past.  He is on Mestinon 2 tablets 3 times a day in addition to the prednisone.  He is not sure the Mestinon is helping him at all.      On exam today, the patient is cooperative and in no distress.  His blood pressure is 140/72.  He has an excellent cough.  He can count to 33 in a single breath.  He has obvious ophthalmoparesis.  He has more movement in the left eye in the horizontal gaze than in the right eye but has poor vertical gaze.  He has weakness of orbicularis oculi graded at about 3.  He has fairly good strength of orbicularis oris.  He has fairly good tongue strength bilaterally.  He has normal or near normal strength in shoulder abduction, wrist extension, finger extension and interosseous muscles.  Triceps is graded at about 3, which was last week.  Biceps may be slightly reduced at 4+.  In the legs, I could not detect weakness proximally or distally.  He can walk on his toes but has a difficult time walking on the right heel and Ashley has noticed that he seems to swing his right leg when he walks.  He can get up  on a stair without difficulty and has good strength in the hamstrings bilaterally.      ASSESSMENT:  Generalized myasthenia gravis.      DISCUSSION:  This patient is seen in followup with myasthenia gravis.  His symptoms are worsening, but his exam looks pretty much the same as it did last week.  I had a long talk with the patient and Ashley about his diagnosis and treatment.  Right now we are trying to get him on prednisone.  He has an appointment to see Dr. Jewell next week for electrodiagnosis and the following week for consultation.  I am going to increase his prednisone to 20 mg a day.  I suggested he cut his Mestinon dose to 1 tablet 3 times a day to see if there is any worsening.  If the symptoms get worse, he should resume 2 tablets 3 times a day.  If he does not notice any deterioration, then he could consider tapering off the Mestinon altogether to see if that makes it worse.  He says that it helped for the first week or so that he took it but does not think it has been helping much since then.      There were several questions about short and long-term management of myasthenia I discussed with the patient and Ashley.  These issues will be addressed in greater detail by Dr. Jewell.  I did not make an appointment to see the patient in followup but will await Dr. Jewell's consultation.     Again I discussed with the pt that he could experience acute worsening of his condition with swallowing or shortness of breath, in which case he should go the the ED at the [facility].  It would also be preferable if he was with someone for part or all of the day and night, in case he did get into distress.       Sincerely,      Amelia Salinas MD      This was a 25-minute appointment more than half of which was spent in counseling and coordination of care.         AMELIA SALINAS MD             D: 12/04/2017 14:23   T: 12/04/2017 14:51   MT: neeta      Name:     JONNIE CHUN   MRN:      [id number]        Account:       [id number]   :      1960           Service Date: 2017      Document: [id number]

## 2017-12-11 ENCOUNTER — OFFICE VISIT (OUTPATIENT)
Dept: NEUROLOGY | Facility: CLINIC | Age: 57
End: 2017-12-11

## 2017-12-11 DIAGNOSIS — G70.01 MYASTHENIA GRAVIS WITH EXACERBATION (H): Primary | ICD-10-CM

## 2017-12-11 DIAGNOSIS — M62.81 GENERALIZED MUSCLE WEAKNESS: ICD-10-CM

## 2017-12-11 NOTE — LETTER
12/11/2017       RE: David Vera  [address] [place] [address]     Dear Colleague,    Thank you for referring your patient, David Vera, to the [facility] EMG at [facility]. Please see a copy of my visit note below.        [facility]  Electrodiagnostic Laboratory    Nerve Conduction & EMG Report          Patient:       David Vera  Patient ID:    [id number]  Gender:        Male  YOB: 1960  Age:           57 Years 3 Months        History & Examination:  57 year old man with several months of eyelid ptosis and trouble moving food around in his mouth. Eval for NMJ disorder.     Techniques: Motor and sensory conduction studies were done with surface recording electrodes.     Results:  Nerve conduction studies:   1. Left sural, SP, median-D2, and ulnar-D5 sensory responses are normal.   2. Left median-APB, ulnar-ADM, peroneal-EDB, and tibial-AH motor responses are normal.   3. Left facial-nasalis 3 Hz RNS showed unequivocal decrement at baseline (38%). This repairs modestly to 33% with brief maximum exercise. Although there is no worsening decrement with exhaustion, repair to 31% is again seen after brief maximum exercise.      Interpretation:  This is an abnormal study. There is electrophysiologic evidence of a disorder of post-synaptic neuromuscular junction transmission (e.g., myasthenia gravis).     Go Jewell MD  Department of Neurology        Sensory NCS      Nerve / Sites Rec. Site Onset Peak NP Amp Ref. PP Amp Dist Jase Ref. Temp     ms ms  V  V  V cm m/s m/s  C   L MEDIAN - Dig II Anti      Wrist Dig II 2.71 4.06 43.1 10.0 81.3 14 51.7 48.0 30.4   L ULNAR - Dig V Anti      Wrist Dig V 2.60 4.11 35.3 8.0 67.4 12.5 48.0 48.0 30.1   L SURAL - Lat Mall 60      Calf Ankle 3.02 3.70 17.1 5.0 30.4 14 46.3 38.0 30.5   L SUP PERONEAL      Lat Leg Abel 2.66 3.39 17.8  29.0 12.5 47.1 38.0 30.5       Motor NCS      Nerve /  Sites Rec. Site Lat Ref. Amp Ref. Rel Amp Dist Jase Ref. Dur. Area Temp.     ms ms mV mV % cm m/s m/s ms %  C   L MEDIAN - APB      Wrist APB 4.17 4.40 9.4 5.0 100 8   9.01 100 30.4      Elbow APB 8.75  9.4  99.8 24 52.4 48.0 9.53 98.6 30.7   L ULNAR - ADM      Wrist ADM 3.59 3.50 14.6 5.0 100 8   7.86 100 29.2      B.Elbow ADM 7.86  13.3  90.6 22 51.5 48.0 7.03 86.5 29      A.Elbow ADM 9.79  13.8  94 10 51.9 48.0 8.07 89.9 29.3   L DEEP PERONEAL - EDB 60      Ankle EDB 4.32 6.00 6.4 2.0 100 8   6.25 100 26.6      FibHead EDB 11.35  6.5  101 36 51.2 38.0 6.98 105 26.7      Pop Fos EDB 12.92  5.6  87.1 9 57.6 38.0 7.81 102 26.6   L TIBIAL - AH      Ankle AH 3.85 6.00 11.5 4.0 100 8   8.91 100 29.4      Pop Fos AH 12.45  8.8  76.2 42 48.9 38.0 9.48 94.8 29.4       F  Wave      Nerve Min F Lat Max F Lat Mean FLat Temp.    ms ms ms  C   L TIBIAL 51.88 57.66 53.34 26.2       Rep Nerve Stim 6      Muscle / Train Time Rate Amp 4-1 Fac Ampl Area 4-1 Fac Area     pps mV % % mVms % %   L ABD DIG MIN (UL)   Baseline  3 14.1 -9.8 100 56.6 -12.8 100   Post Exercise 60 sec 0 3 15.2 -4.1 108 52.8 -10.8 93.4   Post Exercise 60 sec 1 min 3 13.9 -0.6 98.8 57.6 -13.6 102   Post Exercise 60 sec 2 min 3 13.1 0.7 92.9 57.3 -14.7 101   Post Exercise 60 sec 3 min 3 13.0 -0 92.2 56.1 -13.8 99.2   L NASALIS   Baseline  3 1.4 -38.8 100 4.9 -46.9 100   Post Exercise 60 sec 0 3 2.2 -33 160 8.3 -37.3 169   10 sec max exercise (repair) 0 3 1.8 -42.9 130 6.7 -47.4 136   Post Exercise 60 sec 1 min 3 1.6 -39.5 116 6.0 -41.9 123   Post Exercise 60 sec 2 min 3 1.4 -38.7 101 5.5 -44 112   10 sec max exercise (repair) 0 3 2.4 -31.1 169 8.6 -33.4 174                                  Again, thank you for allowing me to participate in the care of your patient.      Sincerely,    Go Jewell MD

## 2017-12-11 NOTE — PROGRESS NOTES
[facility]  Electrodiagnostic Laboratory    Nerve Conduction & EMG Report          Patient:       David Vera  Patient ID:    [id number]  Gender:        Male  YOB: 1960  Age:           57 Years 3 Months        History & Examination:  57 year old man with several months of eyelid ptosis and trouble moving food around in his mouth. Eval for NMJ disorder.     Techniques: Motor and sensory conduction studies were done with surface recording electrodes.     Results:  Nerve conduction studies:   1. Left sural, SP, median-D2, and ulnar-D5 sensory responses are normal.   2. Left median-APB, ulnar-ADM, peroneal-EDB, and tibial-AH motor responses are normal.   3. Left facial-nasalis 3 Hz RNS showed unequivocal decrement at baseline (38%). This repairs modestly to 33% with brief maximum exercise. Although there is no worsening decrement with exhaustion, repair to 31% is again seen after brief maximum exercise.      Interpretation:  This is an abnormal study. There is electrophysiologic evidence of a disorder of post-synaptic neuromuscular junction transmission (e.g., myasthenia gravis).     Go Jewell MD  Department of Neurology        Sensory NCS      Nerve / Sites Rec. Site Onset Peak NP Amp Ref. PP Amp Dist Jase Ref. Temp     ms ms  V  V  V cm m/s m/s  C   L MEDIAN - Dig II Anti      Wrist Dig II 2.71 4.06 43.1 10.0 81.3 14 51.7 48.0 30.4   L ULNAR - Dig V Anti      Wrist Dig V 2.60 4.11 35.3 8.0 67.4 12.5 48.0 48.0 30.1   L SURAL - Lat Mall 60      Calf Ankle 3.02 3.70 17.1 5.0 30.4 14 46.3 38.0 30.5   L SUP PERONEAL      Lat Leg Abel 2.66 3.39 17.8  29.0 12.5 47.1 38.0 30.5       Motor NCS      Nerve / Sites Rec. Site Lat Ref. Amp Ref. Rel Amp Dist Jase Ref. Dur. Area Temp.     ms ms mV mV % cm m/s m/s ms %  C   L MEDIAN - APB      Wrist APB 4.17 4.40 9.4 5.0 100 8   9.01 100 30.4      Elbow APB 8.75  9.4  99.8 24 52.4 48.0 9.53 98.6 30.7   L ULNAR - ADM      Wrist ADM 3.59 3.50 14.6  5.0 100 8   7.86 100 29.2      B.Elbow ADM 7.86  13.3  90.6 22 51.5 48.0 7.03 86.5 29      A.Elbow ADM 9.79  13.8  94 10 51.9 48.0 8.07 89.9 29.3   L DEEP PERONEAL - EDB 60      Ankle EDB 4.32 6.00 6.4 2.0 100 8   6.25 100 26.6      FibHead EDB 11.35  6.5  101 36 51.2 38.0 6.98 105 26.7      Pop Fos EDB 12.92  5.6  87.1 9 57.6 38.0 7.81 102 26.6   L TIBIAL - AH      Ankle AH 3.85 6.00 11.5 4.0 100 8   8.91 100 29.4      Pop Fos AH 12.45  8.8  76.2 42 48.9 38.0 9.48 94.8 29.4       F  Wave      Nerve Min F Lat Max F Lat Mean FLat Temp.    ms ms ms  C   L TIBIAL 51.88 57.66 53.34 26.2       Rep Nerve Stim 6      Muscle / Train Time Rate Amp 4-1 Fac Ampl Area 4-1 Fac Area     pps mV % % mVms % %   L ABD DIG MIN (UL)   Baseline  3 14.1 -9.8 100 56.6 -12.8 100   Post Exercise 60 sec 0 3 15.2 -4.1 108 52.8 -10.8 93.4   Post Exercise 60 sec 1 min 3 13.9 -0.6 98.8 57.6 -13.6 102   Post Exercise 60 sec 2 min 3 13.1 0.7 92.9 57.3 -14.7 101   Post Exercise 60 sec 3 min 3 13.0 -0 92.2 56.1 -13.8 99.2   L NASALIS   Baseline  3 1.4 -38.8 100 4.9 -46.9 100   10 sec max exercise (repair) 0 3 2.2 -33 160 8.3 -37.3 169   Post Exercise 60 sec 0 3 1.8 -42.9 130 6.7 -47.4 136   Post Exercise 60 sec 1 min 3 1.6 -39.5 116 6.0 -41.9 123   Post Exercise 60 sec 2 min 3 1.4 -38.7 101 5.5 -44 112   10 sec max exercise (repair) 0 3 2.4 -31.1 169 8.6 -33.4 174

## 2017-12-11 NOTE — MR AVS SNAPSHOT
After Visit Summary   12/11/2017    David Vera    MRN: [id number]           Patient Information     Date Of Birth          1960        Visit Information        Provider Department      12/11/2017 3:00 PM Go Jewell MD [facility] EMG        Today's Diagnoses     Myasthenia gravis with exacerbation (H)    -  1    Generalized muscle weakness           Follow-ups after your visit        Your next 10 appointments already scheduled     Dec 19, 2017  3:00 PM CST   New Muscular Dystrophy with Go Jewell MD   [facility] NEUROSPECIALTIES ([facility])    [address]  [address] [place] [address] [id number] [phone]              Who to contact     Please call your clinic at [phone] to:    Ask questions about your health    Make or cancel appointments    Discuss your medicines    Learn about your test results    Speak to your doctor   If you have compliments or concerns about an experience at your clinic, or if you wish to file a complaint, please contact [facility] Patient Relations at [phone] or email us at [email or web address]         Additional Information About Your Visit        MyChart Information     Beibamboot gives you secure access to your electronic health record. If you see a primary care provider, you can also send messages to your care team and make appointments. If you have questions, please call your primary care clinic.  If you do not have a primary care provider, please call [phone] and they will assist you.      Beibamboot is an electronic gateway that provides easy, online access to your medical records. With CrowdScannerr, you can request a clinic appointment, read your test results, renew a prescription or communicate with your care team.     To access your existing account, please contact your [facility] or call [phone] for assistance.        Care EveryWhere ID     This is  your Care EveryWhere ID. This could be used by other organizations to access your [place] medical records  [id number]         Blood Pressure from Last 3 Encounters:   12/04/17 140/72   11/28/17 111/69   11/06/17 127/74    Weight from Last 3 Encounters:   12/04/17 64.2 kg (141 lb 9.6 oz)   11/28/17 63.7 kg (140 lb 8 oz)   11/06/17 64.8 kg (142 lb 12.8 oz)              We Performed the Following     EMG     HC NCS MOTOR W OR W/O F-WAVE, 7 OR 8     NEUROMUSCULAR JUNCTION TEST, EACH NERVE          Today's Medication Changes          These changes are accurate as of: 12/11/17  3:38 PM.  If you have any questions, ask your nurse or doctor.               These medicines have changed or have updated prescriptions.        Dose/Directions    pyridostigmine 60 MG tablet   Commonly known as:  MESTINON   This may have changed:    - how much to take  - when to take this  - additional instructions   Used for:  Ophthalmoplegia        Dose:  60 mg   Take 1 tablet (60 mg) by mouth See Admin Instructions One po q day for 3 days, then one po bid for 3 days, then one po tid.   Quantity:  90 tablet   Refills:  1                Primary Care Provider Office Phone # Fax #    Jamarcus Montano [phone]       [facility] [address] [facility] [address]        Equal Access to Services     RIZWAN NOVA AH: Hadii yaya kern hadkimo Soinga, waaxda luqadaha, qaybta kaalmada cindy, brown adams. So [facility] [phone].    ATENCIÓN: Si habla español, tiene a salas disposición servicios gratuitos de asistencia lingüística. Parul al [phone].    We comply with applicable federal civil rights laws and [place] laws. We do not discriminate on the basis of race, color, national origin, age, disability, sex, sexual orientation, or gender identity.            Thank you!     Thank you for choosing [facility]  for your care. Our goal is always to provide you with excellent care. Hearing back  from our patients is one way we can continue to improve our services. Please take a few minutes to complete the written survey that you may receive in the mail after your visit with us. Thank you!             Your Updated Medication List - Protect others around you: Learn how to safely use, store and throw away your medicines at www.disposemymeds.org.          This list is accurate as of: 12/11/17  3:38 PM.  Always use your most recent med list.                   Brand Name Dispense Instructions for use Diagnosis    predniSONE 10 MG tablet    DELTASONE    120 tablet    Take 1 tablet (10 mg) by mouth See Admin Instructions One po q day for 3 days, then two po q day for 3 days, then 3 po q day for 3 days, then 4 po q day.    Myasthenia gravis with exacerbation (H)       pyridostigmine 60 MG tablet    MESTINON    90 tablet    Take 1 tablet (60 mg) by mouth See Admin Instructions One po q day for 3 days, then one po bid for 3 days, then one po tid.    Ophthalmoplegia

## 2017-12-12 DIAGNOSIS — H49.9 OPHTHALMOPLEGIA: ICD-10-CM

## 2017-12-12 RX ORDER — PYRIDOSTIGMINE BROMIDE 60 MG/1
120 TABLET ORAL 3 TIMES DAILY
Qty: 180 TABLET | Refills: 4 | Status: SHIPPED | OUTPATIENT
Start: 2017-12-12 | End: 2017-12-18

## 2017-12-18 DIAGNOSIS — H49.9 OPHTHALMOPLEGIA: ICD-10-CM

## 2017-12-18 DIAGNOSIS — G70.01 MYASTHENIA GRAVIS WITH EXACERBATION (H): ICD-10-CM

## 2017-12-18 RX ORDER — PYRIDOSTIGMINE BROMIDE 60 MG/1
120 TABLET ORAL 3 TIMES DAILY
Qty: 180 TABLET | Refills: 4 | Status: SHIPPED | OUTPATIENT
Start: 2017-12-18 | End: 2019-11-16

## 2017-12-19 ENCOUNTER — OFFICE VISIT (OUTPATIENT)
Dept: NEUROLOGY | Facility: CLINIC | Age: 57
End: 2017-12-19
Payer: COMMERCIAL

## 2017-12-19 VITALS
HEART RATE: 65 BPM | SYSTOLIC BLOOD PRESSURE: 132 MMHG | BODY MASS INDEX: 21.82 KG/M2 | HEIGHT: 67 IN | WEIGHT: 139 LBS | DIASTOLIC BLOOD PRESSURE: 76 MMHG

## 2017-12-19 DIAGNOSIS — G70.00 MYASTHENIA GRAVIS WITHOUT EXACERBATION (H): Primary | ICD-10-CM

## 2017-12-19 RX ORDER — PREDNISONE 10 MG/1
10 TABLET ORAL SEE ADMIN INSTRUCTIONS
Qty: 120 TABLET | Refills: 0 | OUTPATIENT
Start: 2017-12-19

## 2017-12-19 RX ORDER — MYCOPHENOLATE MOFETIL 500 MG/1
1000 TABLET, FILM COATED ORAL 2 TIMES DAILY
Qty: 120 TABLET | Refills: 3 | Status: SHIPPED | OUTPATIENT
Start: 2017-12-19

## 2017-12-19 NOTE — PROGRESS NOTES
Chief Complaint: eyelid ptosis, diplopia, weakness    History of Present Illness:    David Vera is a 57 year old man who I am seeing at the request of Dr. Salinas for evaluation of eyelid ptosis and diplopia. In 2017 he developed drooping of the left eyelid. Severity worsened in the evening. In October eyelid ptosis developed on the right eye.  In August he also developed diplopia. This also worsened at the end of the day. He initially felt that his symptoms were from a sinus problem. He took sudafed and his symptoms resolved. He stopped sudafed after about 2 weeks and his symptoms returned. Curiously similar but less severe eyelid ptosis developed 20 years ago and 3 years ago. Both times the symptom lasted only briefly. The first was attributed to a sinus problem. He had sinus surgery and ptosis resolved. The second was attributed again to a sinus problem. He received antibiotics and the symptom resolved.     In  he developed limb weakness. He regular exercises with weights. Prior to these symptoms he could curl a 45 pound weight for 10 reps. These decreased to 5 pounds for 10 reps. Also in November he developed dysarthria and trouble chewing. No shortness of breath. In early  he started mestinon. He felt that after he started mestinon eyelid ptosis was much improved, but not limb weakness. In mid  he started prednisone: 10 mg daily and then increasing to 20 mg daily after about a week. After prednisone was started he reported less eyelid ptosis and overall improved strength - but not normal. He still develops dysarthria later in the day and when tired.     Diagnostic data is detailed below. Notably, ACHr binding ab mildly elevated, unequivocal decrement was seen on RNS and CT chest showed no thymoma.     Prior pertinent laboratory work-up:  : AChR binding ab 0.7 nmol/L  (N < 0.4). TSH normal.     Prior imagin/17: CT chest showed no evidence of thymoma. No mediastinal mass  "or lymphadenopathy.    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 Decrement was about 40% at the facial-nasalis.      Past Medical History:   Myasthenia gravis    Past Surgical History:  Sinus surgery    Family history:    There is no known family history of myopathy or other neuromuscular disorders.    Social History:    He denies tobacco, alcohol, or illicit drug use.     Medical Allergies:  NKDA     Current Medications:    Current Outpatient Prescriptions   Medication     pyridostigmine (MESTINON) 60 MG tablet     predniSONE (DELTASONE) 10 MG tablet     No current facility-administered medications for this visit.      Review of Systems: A complete review of systems was obtained and was negative except for what was noted above.    Physical examination:    /76 (BP Location: Left arm, Patient Position: Chair, Cuff Size: Adult Regular)  Pulse 65  Ht 5' 7\" (170.2 cm)  Wt 139 lb (63 kg)  BMI 21.77 kg/m2     General Appearance: NAD    Skin: There are no rashes or other skin lesions.    Musculoskeletal:  There is no scoliosis, lordosis, kyphosis, pes cavus, or hammertoes.    Neurologic examination:    Mental status:  Patient is alert, attentive, and oriented x 3.  Language is coherent and fluent without dysarthria or aphasia.  Memory, comprehension and ability to follow commands were intact.       Cranial nerves: Both eyelids droop to about the pupil. Eye lid closure is very weak. He can bury eyelashes but no additional resistance. EOM limited in almost all directions. Diplopia occurs instantaneously. Smile slightly horizontal. Tongue is a bit weak.     Motor exam: No atrophy or fasciculations.   Manual muscle testing revealed the following MRC grade muscle power:   Right Left   Neck flexion 5    Neck extension: 5    Shoulder abduction:  4+ 4   Elbow extension: 5 5   Elbow flexion:  5 5   Wrist " flexion:  5 5   Wrist extension:  5 5   FDI 5 5   Hip flexion 5 5   Knee extension 5 5   Dorsiflexion 5 5   Plantar flexion 5 5     Complex motor skills: No tremor or ataxia     Sensory exam revealed normal perception of vibration, light touch, and pin proximally and distally in the arms and legs bilaterally. Romberg sign was absent.       Gait was narrow and stable.     Deep tendon reflexes:   Right Left   Triceps 2 2   Biceps 2 2   Brachioradialis 2 2   Knee jerk 2 2   Ankle jerk 2 2   Plantar responses were flexor bilaterally.       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 His diagnosis is well defined. Although the ACHr antibody is only mildly positive, within this clinical context I think quite clinically relevant. We discussed the rational for immunomodulating therapy, including goals of treatment and potential risks. The long term goal is to maintain him on the least amount of immunotherapy possible while at the same time adequately controlling symptoms. The short term goal is to get his symptoms better controlled and minimize MG related deficits. As a means to achieve both these goals will increase prednisone to 30 mg daily, and continue slowly escalating as below until adequate symptom resolution. As I suspect that it is unlikely that he will be able to discontinue corticosteroids in a reasonable time I also will start mycophenolate now. Once he is clinically stable and mycophenolate has been on for an adequate period of time will slowly taper prednisone. We discussed the role of thymectomy in myasthenia gravis. This is an option once his disease is under better control and he is stable at a dose of prednisone less than 20 mg daily.  I discussed factors that can potentially worsen MG, including heat, infections, and certain medications. We also discussed MG symptoms that need prompt attention, in particular shortness of breath.  All questions answered. He agrees to the plan below.      Plan:      1. Mestinon: 60 mg TID  2. Increase prednisone to 30 mg daily x 2 weeks. If symptoms not improved increase to 35 mg daily x 2 weeks. If symptoms not improved continue to increase by 5 mg every 2 weeks until symptoms improve to a satisfactory degree. Do not exceed 40 mg daily before meeting with me again. Risks and potential benefits of prednisone discussed.   3. Start vitamin D 800 IU and calcium 1,000mg daily  4. Start mycophenolate 500 mg BID. Monitoring labs: CBC and chemistry. Will repeat labs in 2 weeks. If ok then increase MMF to 1000 mg BID. Repeat labs again 2 weeks later, then q 1 month x 2 and then q 3 months x 2. Discussed risks and potential benefits.   5. Start GI prophylaxis (pepcid 20 mg daily)  6. Referral to thoracic surgery for thymectomy possible at some point pending clinical course  7. If he develops inability to swallow of shortness of breath he should seek urgent medical attention and then let me know. If he develops worsening ocular symptoms, dysarthria, or limb weakness he should let me know ASAP.   8. Follow up with me in 4-6 weeks. Sooner if needed.   ---  ADDENDUM: 1/8/18 - K, WBC and glucose slightly high but CBC and chem otherwise ok. Will increase MMF as above. Communicated with patient.

## 2017-12-19 NOTE — MR AVS SNAPSHOT
After Visit Summary   12/19/2017    David Vera    MRN: [id number]           Patient Information     Date Of Birth          1960        Visit Information        Provider Department      12/19/2017 3:00 PM Go Jewell MD [facility] NEUROSPECIALTIES        Today's Diagnoses     Myasthenia gravis without exacerbation (H)    -  1      Care Instructions    1. Mestinon: Take 60 mg three to five times per day  2. Increase prednisone from 20 mg (2 pills) to 30 mg  (3 pills) every day. If symptoms not improved within 2 weeks then increase 35 mg (3 and and 1/2 pills) daily for 2 weeks. If symptoms not improved increase to 40 mg (4 pills). Do not go above 40 mg before we meet next.   3. Start mycophenolate (cellcept) 500 mg twice a day (1 pill in morning and 1 pill at night). In 2 weeks you need to have a blood test. After that test call me. If everything looks ok then increase mycophenolate to 1000 mg twice a day (2 pills in the morning and 2 pills at night).   4. Start vitamin D 800 IU and calcium 1,000 mg daily  5. Start pepcid 20 mg daily            Follow-ups after your visit        Follow-up notes from your care team     Return in about 1 month (around 1/19/2018).      Your next 10 appointments already scheduled     Jan 23, 2018  3:00 PM CST   Return Muscular Dystrophy with Go Jewell MD   [facility] NEUROSPECIALTIES ([facility])    [address] [id number] [phone]              Future tests that were ordered for you today     Open Standing Orders        Priority Remaining Interval Expires Ordered    Comprehensive metabolic panel Routine 1/1 2/5/2018 12/19/2017          Open Future Orders        Priority Expected Expires Ordered    CBC with platelets differential Routine  12/19/2018 12/19/2017            Who to contact     Please call your clinic at [phone] to:    Ask questions about your health    Make or cancel appointments    Discuss  "your medicines    Learn about your test results    Speak to your doctor   If you have compliments or concerns about an experience at your clinic, or if you wish to file a complaint, please contact [facility] Patient Relations at [phone] or email us at [email or web address]         Additional Information About Your Visit        MediVisionhart Information     InishTecht gives you secure access to your electronic health record. If you see a primary care provider, you can also send messages to your care team and make appointments. If you have questions, please call your primary care clinic.  If you do not have a primary care provider, please call [phone] and they will assist you.      Animoca is an electronic gateway that provides easy, online access to your medical records. With Anim[employer], you can request a clinic appointment, read your test results, renew a prescription or communicate with your care team.     To access your existing account, please contact your [facility] or call [phone] for assistance.        Care EveryWhere ID     This is your Care EveryWhere ID. This could be used by other organizations to access your [place] medical records  [id number]        Your Vitals Were     Pulse Height BMI (Body Mass Index)             65 5' 7\" (170.2 cm) 21.77 kg/m2          Blood Pressure from Last 3 Encounters:   12/19/17 132/76   12/04/17 140/72   11/28/17 111/69    Weight from Last 3 Encounters:   12/19/17 139 lb (63 kg)   12/04/17 141 lb 9.6 oz (64.2 kg)   11/28/17 140 lb 8 oz (63.7 kg)              We Performed the Following     MuSK Antibody          Today's Medication Changes          These changes are accurate as of: 12/19/17  4:14 PM.  If you have any questions, ask your nurse or doctor.               Start taking these medicines.        Dose/Directions    mycophenolate 500 MG tablet   Used for:  Myasthenia gravis without exacerbation (H) "   Started by:  Go Jewell MD        Dose:  1000 mg   Take 2 tablets (1,000 mg) by mouth 2 times daily   Quantity:  120 tablet   Refills:  3            Where to get your medicines      These medications were sent to [id number] - [place]REE BISHOP - [id number]  [id number], [place] [address]     Phone:  [phone]     mycophenolate 500 MG tablet                Primary Care Provider Office Phone # Fax #    Jamarcus Montano [phone]       [facility] [address]  [facility] [address]        Equal Access to Services     [facility]: Hadii aad ku hadasho Soomaali, waaxda luqadaha, qaybta kaalmada adeegyada, brown agustin . So [facility] [phone].    ATENCIÓN: Si habla español, tiene a salas disposición servicios gratuitos de asistencia lingüística. Ll[facility] [phone].    We comply with applicable federal civil rights laws and [place] laws. We do not discriminate on the basis of race, color, national origin, age, disability, sex, sexual orientation, or gender identity.            Thank you!     Thank you for choosing [facility] NEUROSPECIALTIES  for your care. Our goal is always to provide you with excellent care. Hearing back from our patients is one way we can continue to improve our services. Please take a few minutes to complete the written survey that you may receive in the mail after your visit with us. Thank you!             Your Updated Medication List - Protect others around you: Learn how to safely use, store and throw away your medicines at www.disposemymeds.org.          This list is accurate as of: 12/19/17  4:14 PM.  Always use your most recent med list.                   Brand Name Dispense Instructions for use Diagnosis    mycophenolate 500 MG tablet     120 tablet    Take 2 tablets (1,000 mg) by mouth 2 times daily    Myasthenia gravis without exacerbation (H)       predniSONE 10 MG tablet    DELTASONE     120 tablet    Take 1 tablet (10 mg) by mouth See Admin Instructions One po q day for 3 days, then two po q day for 3 days, then 3 po q day for 3 days, then 4 po q day.    Myasthenia gravis with exacerbation (H)       pyridostigmine 60 MG tablet    MESTINON    180 tablet    Take 2 tablets (120 mg) by mouth 3 times daily    Ophthalmoplegia

## 2017-12-19 NOTE — PATIENT INSTRUCTIONS
1. Mestinon: Take 60 mg three to five times per day  2. Increase prednisone from 20 mg (2 pills) to 30 mg  (3 pills) every day. If symptoms not improved within 2 weeks then increase 35 mg (3 and and 1/2 pills) daily for 2 weeks. If symptoms not improved increase to 40 mg (4 pills). Do not go above 40 mg before we meet next.   3. Start mycophenolate (cellcept) 500 mg twice a day (1 pill in morning and 1 pill at night). In 2 weeks you need to have a blood test. After that test call me. If everything looks ok then increase mycophenolate to 1000 mg twice a day (2 pills in the morning and 2 pills at night).   4. Start vitamin D 800 IU and calcium 1,000 mg daily  5. Start pepcid 20 mg daily

## 2017-12-19 NOTE — LETTER
12/19/2017       RE: David Vera  [address] [place] [address]     Dear Colleague,    Thank you for referring your patient, David Vera, to the [facility] NEUROSPECIALT[facility] at [facility] acute hospital. Please see a copy of my visit note below.    Chief Complaint: eyelid ptosis, diplopia, weakness    History of Present Illness:    David Vera is a 57 year old man who I am seeing at the request of Dr. Salinas for evaluation of eyelid ptosis and diplopia. In August 2017 he developed drooping of the left eyelid. Severity worsened in the evening. In October eyelid ptosis developed on the right eye.   In August he also developed diplopia. This also worsened at the end of the day. He initially felt that his symptoms were from a sinus problem. He took sudafed and his symptoms resolved. He stopped sudafed after about 2 weeks and his symptoms returned. Curiously similar but less severe eyelid ptosis developed 20 years ago and 3 years ago. Both times the symptom lasted only briefly. The first was attributed to a sinus problem. He had sinus surgery and ptosis resolved. The second was attributed again to a sinus problem. He received antibiotics and the symptom resolved.     In 11/17 he developed limb weakness. He regular exercises with weights. Prior to these symptoms he could curl a 45 pound weight for 10 reps. These decreased to 5 pounds for 10 reps. Also in November he developed dysarthria and trouble chewing. No shortness of breath. In early 11/17 he started mestinon. He felt that after he started mestinon eyelid ptosis was much improved, but not limb weakness. In mid 11/17 he started prednisone: 10 mg daily and then increasing to 20 mg daily after about a week. After prednisone was started he reported less eyelid ptosis and overall improved strength - but not normal. He still develops dysarthria later in the day and when tired.     Diagnostic data is detailed below.  "Notably, ACHr binding ab mildly elevated, unequivocal decrement was seen on RNS and CT chest showed no thymoma.     Prior pertinent laboratory work-up:  : AChR binding ab 0.7 nmol/L  (N < 0.4). TSH normal.     Prior imagin/17: CT chest showed no evidence of thymoma. No mediastinal mass or lymphadenopathy.    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 Decrement was about 40% at the facial-nasalis.      Past Medical History:   Myasthenia gravis    Past Surgical History:  Sinus surgery    Family history:    There is no known family history of myopathy or other neuromuscular disorders.    Social History:    He denies tobacco, alcohol, or illicit drug use.     Medical Allergies:  NKDA     Current Medications:    Current Outpatient Prescriptions   Medication     pyridostigmine (MESTINON) 60 MG tablet     predniSONE (DELTASONE) 10 MG tablet     No current facility-administered medications for this visit.      Review of Systems: A complete review of systems was obtained and was negative except for what was noted above.    Physical examination:    /76 (BP Location: Left arm, Patient Position: Chair, Cuff Size: Adult Regular)  Pulse 65  Ht 5' 7\" (170.2 cm)  Wt 139 lb (63 kg)  BMI 21.77 kg/m2     General Appearance: NAD    Skin: There are no rashes or other skin lesions.    Musculoskeletal:  There is no scoliosis, lordosis, kyphosis, pes cavus, or hammertoes.    Neurologic examination:    Mental status:  Patient is alert, attentive, and oriented x 3.  Language is coherent and fluent without dysarthria or aphasia.  Memory, comprehension and ability to follow commands were intact.       Cranial nerves: Both eyelids droop to about the pupil. Eye lid closure is very weak. He can bury eyelashes but no additional resistance. EOM limited in almost all directions. Diplopia occurs instantaneously. " Smile slightly horizontal. Tongue is a bit weak.     Motor exam: No atrophy or fasciculations.   Manual muscle testing revealed the following MRC grade muscle power:   Right Left   Neck flexion 5    Neck extension: 5    Shoulder abduction:  4+ 4   Elbow extension: 5 5   Elbow flexion:  5 5   Wrist flexion:  5 5   Wrist extension:  5 5   FDI 5 5   Hip flexion 5 5   Knee extension 5 5   Dorsiflexion 5 5   Plantar flexion 5 5     Complex motor skills: No tremor or ataxia     Sensory exam revealed normal perception of vibration, light touch, and pin proximally and distally in the arms and legs bilaterally. Romberg sign was absent.       Gait was narrow and stable.     Deep tendon reflexes:   Right Left   Triceps 2 2   Biceps 2 2   Brachioradialis 2 2   Knee jerk 2 2   Ankle jerk 2 2   Plantar responses were flexor bilaterally.       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 His diagnosis is well defined. Although the ACHr antibody is only mildly positive, within this clinical context I think quite clinically relevant. We discussed the rational for immunomodulating therapy, including goals of treatment and potential risks. The long term goal is to maintain him on the least amount of immunotherapy possible while at the same time adequately controlling symptoms. The short term goal is to get his symptoms better controlled and minimize MG related deficits. As a means to achieve both these goals will increase prednisone to 30 mg daily, and continue slowly escalating as below until adequate symptom resolution. As I suspect that it is unlikely that he will be able to discontinue corticosteroids in a reasonable time I also will start mycophenolate now. Once he is clinically stable and mycophenolate has been on for an adequate period of time will slowly taper prednisone. We discussed the role of thymectomy in myasthenia gravis. This is an option once  his disease is under better control and he is stable at a dose of prednisone less than 20 mg daily.  I discussed factors that can potentially worsen MG, including heat, infections, and certain medications. We also discussed MG symptoms that need prompt attention, in particular shortness of breath. All questions answered. He agrees to the plan below.      Plan:      1. Mestinon: 60 mg TID  2. Increase prednisone to 30 mg daily x 2 weeks. If symptoms not improved increase to 35 mg daily x 2 weeks. If symptoms not improved continue to increase by 5 mg every 2 weeks until symptoms improve to a satisfactory degree. Do not exceed 40 mg daily before meeting with me again. Risks and potential benefits of prednisone discussed.   3. Start vitamin D 800 IU and calcium 1,000mg daily  4. Start mycophenolate 500 mg BID. Monitoring labs: CBC and chemistry. Will repeat labs in 2 weeks. If ok then increase MMF to 1000 mg BID. Repeat labs again 2 weeks later, then q 1 month x 2 and then q 3 months x 2. Discussed risks and potential benefits.   5. Start GI prophylaxis (pepcid 20 mg daily)  6. Referral to thoracic surgery for thymectomy possible at some point pending clinical course  7. If he develops inability to swallow of shortness of breath he should seek urgent medical attention and then let me know. If he develops worsening ocular symptoms, dysarthria, or limb weakness he should let me know ASAP.   8. Follow up with me in 4-6 weeks. Sooner if needed.   ---  Sincerely,    Go Jewell MD

## 2017-12-20 DIAGNOSIS — G70.01 MYASTHENIA GRAVIS WITH EXACERBATION (H): ICD-10-CM

## 2017-12-20 DIAGNOSIS — G70.01 MYASTHENIA GRAVIS WITH EXACERBATION (H): Primary | ICD-10-CM

## 2017-12-20 LAB
ALBUMIN SERPL-MCNC: 4.2 G/DL (ref 3.4–5)
ALP SERPL-CCNC: 70 U/L (ref 40–150)
ALT SERPL W P-5'-P-CCNC: 32 U/L (ref 0–70)
ANION GAP SERPL CALCULATED.3IONS-SCNC: 9 MMOL/L (ref 3–14)
AST SERPL W P-5'-P-CCNC: 16 U/L (ref 0–45)
BILIRUB SERPL-MCNC: 0.5 MG/DL (ref 0.2–1.3)
BUN SERPL-MCNC: 16 MG/DL (ref 7–30)
CALCIUM SERPL-MCNC: 9.4 MG/DL (ref 8.5–10.1)
CHLORIDE SERPL-SCNC: 104 MMOL/L (ref 94–109)
CO2 SERPL-SCNC: 29 MMOL/L (ref 20–32)
CREAT SERPL-MCNC: 0.74 MG/DL (ref 0.66–1.25)
GFR SERPL CREATININE-BSD FRML MDRD: >90 ML/MIN/1.7M2
GLUCOSE SERPL-MCNC: 97 MG/DL (ref 70–99)
POTASSIUM SERPL-SCNC: 4.7 MMOL/L (ref 3.4–5.3)
PROT SERPL-MCNC: 7.6 G/DL (ref 6.8–8.8)
SODIUM SERPL-SCNC: 142 MMOL/L (ref 133–144)

## 2017-12-20 NOTE — MR AVS SNAPSHOT
After Visit Summary   12/20/2017    David Vera    MRN: [id number]           Patient Information     Date Of Birth          1960        Visit Information        Provider Department      12/20/2017 11:00 AM [facility] Epilepsy Care         Follow-ups after your visit        Your next 10 appointments already scheduled     Jan 23, 2018  3:00 PM CST   Return Muscular Dystrophy with Go Jewell MD   [facility] NEUROSPECIALTIES ([facility] Clin[facility])    [address] [address] [phone]              Future tests that were ordered for you today     Open Standing Orders        Priority Remaining Interval Expires Ordered    MuSK Antibody Routine 1/1  3/20/2018 12/20/2017    Comprehensive Metabolic Panel Routine 2/2  3/20/2018 12/20/2017    CBC with Platelets Routine 2/2  3/20/2018 12/20/2017            Who to contact     Please call your clinic at [phone] to:    Ask questions about your health    Make or cancel appointments    Discuss your medicines    Learn about your test results    Speak to your doctor   If you have compliments or concerns about an experience at your clinic, or if you wish to file a complaint, please contact [facility] Patient Relations at [phone] or email us at [email or web address]         Additional Information About Your Visit        MyChart Information     [employer] gives you secure access to your electronic health record. If you see a primary care provider, you can also send messages to your care team and make appointments. If you have questions, please call your primary care clinic.  If you do not have a primary care provider, please call [phone] and they will assist you.      [employer] is an electronic gateway that provides easy, online access to your medical records. With [employer], you can request a clinic appointment, read your test results, renew a prescription or communicate  with your care team.     To access your existing account, please contact your [facility] or call [phone] for assistance.        Care EveryWhere ID     This is your Care EveryWhere ID. This could be used by other organizations to access your [place] medical records  [id number]         Blood Pressure from Last 3 Encounters:   12/19/17 132/76   12/04/17 140/72   11/28/17 111/69    Weight from Last 3 Encounters:   12/19/17 63 kg (139 lb)   12/04/17 64.2 kg (141 lb 9.6 oz)   11/28/17 63.7 kg (140 lb 8 oz)              Today, you had the following     No orders found for display       Primary Care Provider Office Phone # Fax #    Jamarcus Montano [phone]       [place] [facility] [address]  [facility] [address]        Equal Access to Services     [facility]: Hadii aad ku hadasho Soomaali, waaxda luqadaha, qaybta kaalmada adeegyada, waxay ronin haybinn jon agustin . So [facility] [phone].    ATENCIÓN: Si habla español, tiene a salas disposición servicios gratuitos de asistencia lingüística. Ivettemarques al [phone].    We comply with applicable federal civil rights laws and [place] laws. We do not discriminate on the basis of race, color, national origin, age, disability, sex, sexual orientation, or gender identity.            Thank you!     Thank you for choosing [facility] EPILEPSY [facility]  for your care. Our goal is always to provide you with excellent care. Hearing back from our patients is one way we can continue to improve our services. Please take a few minutes to complete the written survey that you may receive in the mail after your visit with us. Thank you!             Your Updated Medication List - Protect others around you: Learn how to safely use, store and throw away your medicines at www.disposemymeds.org.          This list is accurate as of: 12/20/17 11:20 AM.  Always use your most recent med list.                   Brand Name Dispense  Instructions for use Diagnosis    mycophenolate 500 MG tablet     120 tablet    Take 2 tablets (1,000 mg) by mouth 2 times daily    Myasthenia gravis without exacerbation (H)       predniSONE 10 MG tablet    DELTASONE    120 tablet    Take 1 tablet (10 mg) by mouth See Admin Instructions One po q day for 3 days, then two po q day for 3 days, then 3 po q day for 3 days, then 4 po q day.    Myasthenia gravis with exacerbation (H)       pyridostigmine 60 MG tablet    MESTINON    180 tablet    Take 2 tablets (120 mg) by mouth 3 times daily    Ophthalmoplegia

## 2017-12-27 LAB — MUSK AB SER-SCNC: 0 NMOL/L (ref 0–0.02)

## 2018-01-03 DIAGNOSIS — G70.01 MYASTHENIA GRAVIS WITH EXACERBATION (H): ICD-10-CM

## 2018-01-03 LAB
ALBUMIN SERPL-MCNC: 4 G/DL (ref 3.4–5)
ALP SERPL-CCNC: 66 U/L (ref 40–150)
ALT SERPL W P-5'-P-CCNC: 29 U/L (ref 0–70)
ANION GAP SERPL CALCULATED.3IONS-SCNC: 5 MMOL/L (ref 3–14)
AST SERPL W P-5'-P-CCNC: 9 U/L (ref 0–45)
BILIRUB SERPL-MCNC: 0.3 MG/DL (ref 0.2–1.3)
BUN SERPL-MCNC: 18 MG/DL (ref 7–30)
CALCIUM SERPL-MCNC: 9.5 MG/DL (ref 8.5–10.1)
CHLORIDE SERPL-SCNC: 102 MMOL/L (ref 94–109)
CO2 SERPL-SCNC: 33 MMOL/L (ref 20–32)
CREAT SERPL-MCNC: 0.79 MG/DL (ref 0.66–1.25)
ERYTHROCYTE [DISTWIDTH] IN BLOOD BY AUTOMATED COUNT: 13.9 % (ref 10–15)
GFR SERPL CREATININE-BSD FRML MDRD: >90 ML/MIN/1.7M2
GLUCOSE SERPL-MCNC: 149 MG/DL (ref 70–99)
HCT VFR BLD AUTO: 49.1 % (ref 40–53)
HGB BLD-MCNC: 15.9 G/DL (ref 13.3–17.7)
MCH RBC QN AUTO: 31.6 PG (ref 26.5–33)
MCHC RBC AUTO-ENTMCNC: 32.4 G/DL (ref 31.5–36.5)
MCV RBC AUTO: 98 FL (ref 78–100)
PLATELET # BLD AUTO: 285 10E9/L (ref 150–450)
POTASSIUM SERPL-SCNC: 5.7 MMOL/L (ref 3.4–5.3)
PROT SERPL-MCNC: 7.2 G/DL (ref 6.8–8.8)
RBC # BLD AUTO: 5.03 10E12/L (ref 4.4–5.9)
SODIUM SERPL-SCNC: 140 MMOL/L (ref 133–144)
WBC # BLD AUTO: 11.9 10E9/L (ref 4–11)

## 2018-01-03 NOTE — MR AVS SNAPSHOT
After Visit Summary   1/3/2018    David Vera    MRN: [id number]           Patient Information     Date Of Birth          1960        Visit Information        Provider Department      1/3/2018 1:15 PM [facility] Epilepsy [facility]         Follow-ups after your visit        Your next 10 appointments already scheduled     Jan 23, 2018  3:00 PM CST   Return Muscular Dystrophy with Go Jewell MD   [facility] NEUROSPECIALTIES ([facility])    [address] [facility]  [address]  [facility] [id number] [phone]              Who to contact     Please call your clinic at [phone] to:    Ask questions about your health    Make or cancel appointments    Discuss your medicines    Learn about your test results    Speak to your doctor   If you have compliments or concerns about an experience at your clinic, or if you wish to file a complaint, please contact [facility] Patient Relations at [phone] or email us at [email or web address]         Additional Information About Your Visit        MyChart Information     [employer] gives you secure access to your electronic health record. If you see a primary care provider, you can also send messages to your care team and make appointments. If you have questions, please call your primary care clinic.  If you do not have a primary care provider, please call [phone] and they will assist you.      [employer] is an electronic gateway that provides easy, online access to your medical records. With [employer], you can request a clinic appointment, read your test results, renew a prescription or communicate with your care team.     To access your existing account, please contact your [facility] or call [phone] for assistance.        Care EveryWhere ID     This is your Care EveryWhere ID. This could be used by other organizations to access your [facility]  records  [id number]         Blood Pressure from Last 3 Encounters:   12/19/17 132/76   12/04/17 140/72   11/28/17 111/69    Weight from Last 3 Encounters:   12/19/17 139 lb (63 kg)   12/04/17 141 lb 9.6 oz (64.2 kg)   11/28/17 140 lb 8 oz (63.7 kg)              Today, you had the following     No orders found for display       Primary Care Provider Office Phone # Fax #    aJmarcus Montano [phone]       [place] [facility] [address]  [facility] [address]        Equal Access to Services     [facility]: Hadii yaya Posey, wajuliada ivet, qaybta kaalmada cindy, brown agustin . So [facility] [phone].    ATENCIÓN: Si habla español, tiene a salas disposición servicios gratuitos de asistencia lingüística. [facility] [phone].    We comply with applicable federal civil rights laws and [place] laws. We do not discriminate on the basis of race, color, national origin, age, disability, sex, sexual orientation, or gender identity.            Thank you!     Thank you for choosing [facility] EPILEPSY [facility]  for your care. Our goal is always to provide you with excellent care. Hearing back from our patients is one way we can continue to improve our services. Please take a few minutes to complete the written survey that you may receive in the mail after your visit with us. Thank you!             Your Updated Medication List - Protect others around you: Learn how to safely use, store and throw away your medicines at www.disposemymeds.org.          This list is accurate as of: 1/3/18  3:34 PM.  Always use your most recent med list.                   Brand Name Dispense Instructions for use Diagnosis    mycophenolate 500 MG tablet     120 tablet    Take 2 tablets (1,000 mg) by mouth 2 times daily    Myasthenia gravis without exacerbation (H)       predniSONE 10 MG tablet    DELTASONE    120 tablet    Take 1 tablet (10 mg) by mouth See Admin Instructions One po q day  for 3 days, then two po q day for 3 days, then 3 po q day for 3 days, then 4 po q day.    Myasthenia gravis with exacerbation (H)       pyridostigmine 60 MG tablet    MESTINON    180 tablet    Take 2 tablets (120 mg) by mouth 3 times daily    Ophthalmoplegia

## 2018-01-09 ENCOUNTER — MYC REFILL (OUTPATIENT)
Dept: NEUROLOGY | Facility: CLINIC | Age: 58
End: 2018-01-09

## 2018-01-09 DIAGNOSIS — G70.01 MYASTHENIA GRAVIS WITH EXACERBATION (H): ICD-10-CM

## 2018-01-09 RX ORDER — PREDNISONE 10 MG/1
10 TABLET ORAL SEE ADMIN INSTRUCTIONS
Qty: 120 TABLET | Refills: 0 | Status: SHIPPED | OUTPATIENT
Start: 2018-01-09 | End: 2018-01-09

## 2018-01-10 RX ORDER — PREDNISONE 10 MG/1
10 TABLET ORAL SEE ADMIN INSTRUCTIONS
Qty: 120 TABLET | Refills: 0 | Status: SHIPPED | OUTPATIENT
Start: 2018-01-10 | End: 2018-03-06

## 2018-01-23 ENCOUNTER — OFFICE VISIT (OUTPATIENT)
Dept: NEUROLOGY | Facility: CLINIC | Age: 58
End: 2018-01-23
Payer: COMMERCIAL

## 2018-01-23 ENCOUNTER — TELEPHONE (OUTPATIENT)
Dept: NEUROLOGY | Facility: CLINIC | Age: 58
End: 2018-01-23

## 2018-01-23 VITALS
TEMPERATURE: 97.7 F | WEIGHT: 142 LBS | BODY MASS INDEX: 22.29 KG/M2 | SYSTOLIC BLOOD PRESSURE: 135 MMHG | HEART RATE: 78 BPM | HEIGHT: 67 IN | DIASTOLIC BLOOD PRESSURE: 83 MMHG

## 2018-01-23 DIAGNOSIS — G70.01 MYASTHENIA GRAVIS WITH EXACERBATION (H): Primary | ICD-10-CM

## 2018-01-23 LAB
ANION GAP SERPL CALCULATED.3IONS-SCNC: 11 MMOL/L (ref 3–14)
BUN SERPL-MCNC: 17 MG/DL (ref 7–30)
CALCIUM SERPL-MCNC: 9.8 MG/DL (ref 8.5–10.1)
CHLORIDE SERPL-SCNC: 107 MMOL/L (ref 94–109)
CO2 SERPL-SCNC: 27 MMOL/L (ref 20–32)
CREAT SERPL-MCNC: 0.92 MG/DL (ref 0.66–1.25)
ERYTHROCYTE [DISTWIDTH] IN BLOOD BY AUTOMATED COUNT: 14.3 % (ref 10–15)
GFR SERPL CREATININE-BSD FRML MDRD: 85 ML/MIN/1.7M2
GLUCOSE SERPL-MCNC: 119 MG/DL (ref 70–99)
HCT VFR BLD AUTO: 49.2 % (ref 40–53)
HGB BLD-MCNC: 15.6 G/DL (ref 13.3–17.7)
MCH RBC QN AUTO: 31.6 PG (ref 26.5–33)
MCHC RBC AUTO-ENTMCNC: 31.7 G/DL (ref 31.5–36.5)
MCV RBC AUTO: 100 FL (ref 78–100)
PLATELET # BLD AUTO: 251 10E9/L (ref 150–450)
POTASSIUM SERPL-SCNC: 6.5 MMOL/L (ref 3.4–5.3)
RBC # BLD AUTO: 4.94 10E12/L (ref 4.4–5.9)
SODIUM SERPL-SCNC: 145 MMOL/L (ref 133–144)
WBC # BLD AUTO: 13.6 10E9/L (ref 4–11)

## 2018-01-23 NOTE — MR AVS SNAPSHOT
After Visit Summary   1/23/2018    David Vera    MRN: [id number]           Patient Information     Date Of Birth          1960        Visit Information        Provider Department      1/23/2018 3:00 PM Go Jewell MD [facility] NEUROSPECIALTIES        Today's Diagnoses     Myasthenia gravis with exacerbation (H)    -  1       Follow-ups after your visit        Follow-up notes from your care team     Return in about 6 weeks (around 3/6/2018).      Your next 10 appointments already scheduled     Mar 06, 2018  2:30 PM CST   Return Muscular Dystrophy with Go Jewell MD   [facility] NEUROSPECIALTIES ([facility] Affiliate Clinics)    [address] [facility] [address] [address]  [address]  [facility] [address] [phone]              Who to contact     Please call your clinic at [phone] to:    Ask questions about your health    Make or cancel appointments    Discuss your medicines    Learn about your test results    Speak to your doctor   If you have compliments or concerns about an experience at your clinic, or if you wish to file a complaint, please contact [facility] Patient Relations at [phone] or email us at [email or web address]         Additional Information About Your Visit        MyChart Information     [employer] gives you secure access to your electronic health record. If you see a primary care provider, you can also send messages to your care team and make appointments. If you have questions, please call your primary care clinic.  If you do not have a primary care provider, please call [phone] and they will assist you.      [employer] is an electronic gateway that provides easy, online access to your medical records. With [employer], you can request a clinic appointment, read your test results, renew a prescription or communicate with your care team.     To access your existing account, please contact your [facility] or call  [phone] for assistance.        Care EveryWhere ID     This is your Care EveryWhere ID. This could be used by other organizations to access your [place] medical records  [id number]         Blood Pressure from Last 3 Encounters:   12/19/17 132/76   12/04/17 140/72   11/28/17 111/69    Weight from Last 3 Encounters:   12/19/17 139 lb (63 kg)   12/04/17 141 lb 9.6 oz (64.2 kg)   11/28/17 140 lb 8 oz (63.7 kg)              We Performed the Following     Basic metabolic panel     CBC with platelets        Primary Care Provider Office Phone # Fax #    Jamarcus Montano [phone]       [place] [facility] [address]  [facility] [address]        Equal Access to Services     [facility] : Hadii yaya kern hadasho Soomaali, waaxda luqadaha, qaybta kaalmada adeegyada, brown agustin . So [facility] [phone].    ATENCIÓN: Si habla español, tiene a salas disposición servicios gratuitos de asistencia lingüística. Llame al [phone].    We comply with applicable federal civil rights laws and [place] laws. We do not discriminate on the basis of race, color, national origin, age, disability, sex, sexual orientation, or gender identity.            Thank you!     Thank you for choosing [facility] NEUROSPECIALTIES  for your care. Our goal is always to provide you with excellent care. Hearing back from our patients is one way we can continue to improve our services. Please take a few minutes to complete the written survey that you may receive in the mail after your visit with us. Thank you!             Your Updated Medication List - Protect others around you: Learn how to safely use, store and throw away your medicines at www.disposemymeds.org.          This list is accurate as of: 1/23/18  3:35 PM.  Always use your most recent med list.                   Brand Name Dispense Instructions for use Diagnosis    mycophenolate 500 MG tablet     120 tablet    Take 2 tablets (1,000 mg) by mouth 2  times daily    Myasthenia gravis without exacerbation (H)       predniSONE 10 MG tablet    DELTASONE    120 tablet    Take 1 tablet (10 mg) by mouth See Admin Instructions One po q day for 3 days, then two po q day for 3 days, then 3 po q day for 3 days, then 4 po q day.    Myasthenia gravis with exacerbation (H)       pyridostigmine 60 MG tablet    MESTINON    180 tablet    Take 2 tablets (120 mg) by mouth 3 times daily    Ophthalmoplegia

## 2018-01-23 NOTE — LETTER
2018       RE: David Vera  [address] [place] [address]     Dear Colleague,    Thank you for referring your patient, David Vera, to the [facility]UROSPECIALT[facility] at [facility] Please see a copy of my visit note below.    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 In 2017 he developed drooping of the left eyelid. In August he developed diplopia. In  he developed limb weakness. No shortness of breath. In early  he started mestinon, which helped ptosis. In mid  he started prednisone: 10 mg daily and then increasing to 20 mg daily after about a week. After prednisone was started he reported less eyelid ptosis and overall improved strength - but not normal. He increased to 30 mg in . In  he also started mycophenolate.     Interval history:  I last saw him 17. His current dose of prednisone is 35 mg daily. He also takes mycophenolate 1000 mg BID. He feels stronger in his arms and legs. He is back to lifting his regular amount when exercising. He still has marked eyelid ptosis, especially on the left, but overall improved. He still experiences diplopia. Diplopia has not improved. No dysarthria, dysphagia, or shortness of breath.     Prior pertinent laboratory work-up:  : AChR binding ab 0.7 nmol/L  (N < 0.4). TSH normal.     Prior imagin/17: CT chest showed no evidence of thymoma. No mediastinal mass or lymphadenopathy.    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 Decrement was about 40% at the facial-nasalis.      Past Medical History:   Myasthenia gravis    Past Surgical History:  Sinus surgery    Family history:    There is no known family history of myopathy or other neuromuscular disorders.    Social  History:    He denies tobacco, alcohol, or illicit drug use.     Medical Allergies:  NKDA     Current Medications:    Current Outpatient Prescriptions   Medication     predniSONE (DELTASONE) 10 MG tablet     CELLCEPT (BRAND) 500 MG TABLET     pyridostigmine (MESTINON) 60 MG tablet     No current facility-administered medications for this visit.      Review of Systems: A complete review of systems was obtained and was negative except for what was noted above.    Physical examination:      General Appearance: NAD    Skin: There are no rashes or other skin lesions.    Musculoskeletal:  There is no scoliosis, lordosis, kyphosis, pes cavus, or hammertoes.    Neurologic examination:    Mental status:  Patient is alert, attentive, and oriented x 3.  Language is coherent and fluent without dysarthria or aphasia.  Memory, comprehension and ability to follow commands were intact.       Cranial nerves: Right eyelid is normal at baseline. Left is about to the pupil. Both are weak with eye closure. Ptosis worsens after forced eye closure. No diplopia at mid position but he has diplopia with right and left gaze. Eyes are clearly dysconjugate. Face and tongue strong.     Motor exam: No atrophy or fasciculations.   Manual muscle testing revealed the following MRC grade muscle power:   Right Left   Neck flexion 5    Neck extension: 5    Shoulder abduction:  5 5   Elbow extension: 5 5   Elbow flexion:  5 5   Wrist flexion:  5 5   Wrist extension:  5 5   FDI 5 5   Hip flexion 5 5   Knee extension 5 5   Dorsiflexion 5 5   Plantar flexion 5 5     Complex motor skills: No tremor or ataxia     Sensory exam: Normal vibration, light touch, and pin proximally and distally in the arms and legs bilaterally.     Gait was narrow and stable.     Deep tendon reflexes:   Right Left   Triceps 2 2   Biceps 2 2   Brachioradialis 2 2   Knee jerk 2 2   Ankle jerk 2 2   Plantar responses were flexor bilaterally.       Assessment:    David pwoell a  57 year old man with 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 Treatment goals are unchanged from last visit. He is doing well with prednisone - clearly improved but still has unequivocal deficits. Will make a small increase in prednisone today. The long term goal is to transition to MMF. Referral for thymectomy is also a possibility once symptoms are more stable and prednisone is weaned. All questions answered. He agrees to the plan below.      Plan:      1. Mestinon: continue 60 mg TID  2. Increase prednisone from 35 mg daily to 40 mg daily  3. Continue vitamin D 800 IU and calcium 1,000mg daily  4. Continue mycophenolate 1000 mg BID. Monitoring labs: CBC and chemistry today.  5. Continue GI prophylaxis (pepcid 20 mg daily)  6. Referral to thoracic surgery for thymectomy possible at some point pending clinical course  7. If he develops inability to swallow of shortness of breath he should seek urgent medical attention and then let me know. If he develops worsening ocular symptoms, dysarthria, or limb weakness he should let me know ASAP.   8. Follow up with me in 6 weeks. Sooner if needed.   ---    Sincerely,    Go Jewell MD

## 2018-01-23 NOTE — PROGRESS NOTES
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 In 2017 he developed drooping of the left eyelid. In August he developed diplopia. In  he developed limb weakness. No shortness of breath. In early  he started mestinon, which helped ptosis. In mid  he started prednisone: 10 mg daily and then increasing to 20 mg daily after about a week. After prednisone was started he reported less eyelid ptosis and overall improved strength - but not normal. He increased to 30 mg in . In  he also started mycophenolate.     Interval history:  I last saw him 17. His current dose of prednisone is 35 mg daily. He also takes mycophenolate 1000 mg BID. He feels stronger in his arms and legs. He is back to lifting his regular amount when exercising. He still has marked eyelid ptosis, especially on the left, but overall improved. He still experiences diplopia. Diplopia has not improved. No dysarthria, dysphagia, or shortness of breath.     Prior pertinent laboratory work-up:  : AChR binding ab 0.7 nmol/L  (N < 0.4). TSH normal.     Prior imagin/17: CT chest showed no evidence of thymoma. No mediastinal mass or lymphadenopathy.    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 Decrement was about 40% at the facial-nasalis.      Past Medical History:   Myasthenia gravis    Past Surgical History:  Sinus surgery    Family history:    There is no known family history of myopathy or other neuromuscular disorders.    Social History:    He denies tobacco, alcohol, or illicit drug use.     Medical Allergies:  NKDA     Current Medications:    Current Outpatient Prescriptions   Medication     predniSONE (DELTASONE) 10 MG tablet     CELLCEPT (BRAND) 500 MG TABLET     pyridostigmine (MESTINON) 60 MG tablet     No current  facility-administered medications for this visit.      Review of Systems: A complete review of systems was obtained and was negative except for what was noted above.    Physical examination:      General Appearance: NAD    Skin: There are no rashes or other skin lesions.    Musculoskeletal:  There is no scoliosis, lordosis, kyphosis, pes cavus, or hammertoes.    Neurologic examination:    Mental status:  Patient is alert, attentive, and oriented x 3.  Language is coherent and fluent without dysarthria or aphasia.  Memory, comprehension and ability to follow commands were intact.       Cranial nerves: Right eyelid is normal at baseline. Left is about to the pupil. Both are weak with eye closure. Ptosis worsens after forced eye closure. No diplopia at mid position but he has diplopia with right and left gaze. Eyes are clearly dysconjugate. Face and tongue strong.     Motor exam: No atrophy or fasciculations.   Manual muscle testing revealed the following MRC grade muscle power:   Right Left   Neck flexion 5    Neck extension: 5    Shoulder abduction:  5 5   Elbow extension: 5 5   Elbow flexion:  5 5   Wrist flexion:  5 5   Wrist extension:  5 5   FDI 5 5   Hip flexion 5 5   Knee extension 5 5   Dorsiflexion 5 5   Plantar flexion 5 5     Complex motor skills: No tremor or ataxia     Sensory exam: Normal vibration, light touch, and pin proximally and distally in the arms and legs bilaterally.     Gait was narrow and stable.     Deep tendon reflexes:   Right Left   Triceps 2 2   Biceps 2 2   Brachioradialis 2 2   Knee jerk 2 2   Ankle jerk 2 2   Plantar responses were flexor bilaterally.       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 Treatment goals are unchanged from last visit. He is doing well with prednisone - clearly improved but still has unequivocal deficits. Will make a small increase in prednisone today. The long term goal is to  transition to MMF. Referral for thymectomy is also a possibility once symptoms are more stable and prednisone is weaned. All questions answered. He agrees to the plan below.      Plan:      1. Mestinon: continue 60 mg TID  2. Increase prednisone from 35 mg daily to 40 mg daily  3. Continue vitamin D 800 IU and calcium 1,000mg daily  4. Continue mycophenolate 1000 mg BID. Monitoring labs: CBC and chemistry today.  5. Continue GI prophylaxis (pepcid 20 mg daily)  6. Referral to thoracic surgery for thymectomy possible at some point pending clinical course  7. If he develops inability to swallow of shortness of breath he should seek urgent medical attention and then let me know. If he develops worsening ocular symptoms, dysarthria, or limb weakness he should let me know ASAP.   8. Follow up with me in 6 weeks. Sooner if needed.   ---

## 2018-01-24 ENCOUNTER — MYC MEDICAL ADVICE (OUTPATIENT)
Dept: NEUROLOGY | Facility: CLINIC | Age: 58
End: 2018-01-24

## 2018-01-24 ENCOUNTER — TELEPHONE (OUTPATIENT)
Dept: NEUROLOGY | Facility: CLINIC | Age: 58
End: 2018-01-24

## 2018-01-24 NOTE — TELEPHONE ENCOUNTER
Called by lab that patient has critical potassium of 6.5.  Attempted to call patient and emergency contact to go to ED for evaluation as laboratory states that specimen was intact and not hemoloyzed as he needs repeat draw tp see of true result.     Will also inform provider who ordered test.      Felice Whaley PGY-4

## 2018-01-24 NOTE — TELEPHONE ENCOUNTER
I called him back regarding the potassium level of 6.5. David denies any cardiac symptoms including chest pain, palpations, lightheadedness or syncope.  He did go to the ER last night as appropriately advised by Dr. Whaley. His potassium was rechecked and was normal. He will send us the result.

## 2018-03-06 ENCOUNTER — OFFICE VISIT (OUTPATIENT)
Dept: NEUROLOGY | Facility: CLINIC | Age: 58
End: 2018-03-06
Payer: COMMERCIAL

## 2018-03-06 VITALS
DIASTOLIC BLOOD PRESSURE: 68 MMHG | TEMPERATURE: 97.6 F | WEIGHT: 146.2 LBS | HEIGHT: 67 IN | HEART RATE: 78 BPM | BODY MASS INDEX: 22.95 KG/M2 | SYSTOLIC BLOOD PRESSURE: 115 MMHG | RESPIRATION RATE: 16 BRPM

## 2018-03-06 DIAGNOSIS — G70.00 MYASTHENIA GRAVIS WITHOUT EXACERBATION (H): Primary | ICD-10-CM

## 2018-03-06 DIAGNOSIS — G70.01 MYASTHENIA GRAVIS WITH EXACERBATION (H): ICD-10-CM

## 2018-03-06 RX ORDER — PREDNISONE 10 MG/1
10 TABLET ORAL 4 TIMES DAILY
Qty: 120 TABLET | Refills: 3 | Status: SHIPPED | OUTPATIENT
Start: 2018-03-06 | End: 2020-03-10

## 2018-03-06 ASSESSMENT — PAIN SCALES - GENERAL: PAINLEVEL: NO PAIN (0)

## 2018-03-06 NOTE — MR AVS SNAPSHOT
After Visit Summary   3/6/2018    David Vera    MRN: [id number]           Patient Information     Date Of Birth          1960        Visit Information        Provider Department      3/6/2018 2:30 PM Go Jewell MD [facility] NEUROSPECIALTIES        Today's Diagnoses     Myasthenia gravis without exacerbation (H)    -  1       Follow-ups after your visit        Follow-up notes from your care team     Return in about 2 months (around 5/6/2018).      Your next 10 appointments already scheduled     May 08, 2018  2:30 PM CDT   Return Muscular Dystrophy with Go Jewell MD   [facility]UROSPECIALTIES ([facility])    [address] [facility]  [address] [place] [address] [phone]              Who to contact     Please call your clinic at [phone] to:    Ask questions about your health    Make or cancel appointments    Discuss your medicines    Learn about your test results    Speak to your doctor            Additional Information About Your Visit        [employer] Information     [email or web address] gives you secure access to your electronic health record. If you see a primary care provider, you can also send messages to your care team and make appointments. If you have questions, please call your primary care clinic.  If you do not have a primary care provider, please call [phone] and they will assist you.      [email or web address] is an electronic gateway that provides easy, online access to your medical records. With [email or web address], you can request a clinic appointment, read your test results, renew a prescription or communicate with your care team.     To access your existing account, please contact your [facility] or call [phone] for assistance.        Care EveryWhere ID     This is your Care EveryWhere ID. This could be used by other organizations to access your [place] medical records  [id number]        Your Vitals Were     Pulse Temperature Respirations  "Height BMI (Body Mass Index)       78 97.6  F (36.4  C) 16 5' 7.32\" (171 cm) 22.68 kg/m2        Blood Pressure from Last 3 Encounters:   03/06/18 115/68   01/23/18 135/83   12/19/17 132/76    Weight from Last 3 Encounters:   03/06/18 146 lb 3.2 oz (66.3 kg)   01/23/18 142 lb (64.4 kg)   12/19/17 139 lb (63 kg)              Today, you had the following     No orders found for display       Primary Care Provider Office Phone # Fax #    Jamarcus Montano [phone]       [facility] [address] [facility] [address]        Equal Access to Services     TEAGAN NOVA : Rodri ramirezo Soinga, waaxda luqadaha, qaybta kaalmada adeegyada, brown agustin . So [facility] [phone].    ATENCIÓN: Si habla español, tiene a salas disposición servicios gratuitos de asistencia lingüística. LlPark[facility] [phone].    We comply with applicable federal civil rights laws and [place] laws. We do not discriminate on the basis of race, color, national origin, age, disability, sex, sexual orientation, or gender identity.            Thank you!     Thank you for choosing [facility] NEUROSPECIALTIES  for your care. Our goal is always to provide you with excellent care. Hearing back from our patients is one way we can continue to improve our services. Please take a few minutes to complete the written survey that you may receive in the mail after your visit with us. Thank you!             Your Updated Medication List - Protect others around you: Learn how to safely use, store and throw away your medicines at www.disposemymeds.org.          This list is accurate as of 3/6/18  3:23 PM.  Always use your most recent med list.                   Brand Name Dispense Instructions for use Diagnosis    mycophenolate 500 MG tablet     120 tablet    Take 2 tablets (1,000 mg) by mouth 2 times daily    Myasthenia gravis without exacerbation (H)       predniSONE 10 MG tablet    DELTASONE    120 tablet    Take 1 " tablet (10 mg) by mouth See Admin Instructions One po q day for 3 days, then two po q day for 3 days, then 3 po q day for 3 days, then 4 po q day.    Myasthenia gravis with exacerbation (H)       pyridostigmine 60 MG tablet    MESTINON    180 tablet    Take 2 tablets (120 mg) by mouth 3 times daily    Ophthalmoplegia

## 2018-03-06 NOTE — PROGRESS NOTES
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 In 2017 he developed drooping of the left eyelid. In August he developed diplopia. In  he developed limb weakness. No shortness of breath. In early  he started mestinon, which helped ptosis. In mid  he started prednisone: 10 mg daily and then increasing to 20 mg daily after about a week. After prednisone was started he reported less eyelid ptosis and overall improved strength - but not normal. He increased to 30 mg in . In  he also started mycophenolate.     Interval history:  I last saw him 18. His current dose of prednisone is 40 mg daily. He has been at this dose for about 1 month. Within the last week he has noticed major improvements in his MG. He notices that when he turns his head quickly it takes some time to focus, but otherwise his vision has resolved. He does not really have diplopia anymore. No ptosis. No dysarthria, dysphagia or shortness of breath. No limb weakness. He was able to go for a 5 mile run this week. He still mycophenolate 1000 mg BID. He also takes mestinon 60 mg QID. He thinks this is still helpful.     Prior pertinent laboratory work-up:  : AChR binding ab 0.7 nmol/L  (N < 0.4). TSH normal.     Prior imagin/17: CT chest showed no evidence of thymoma. No mediastinal mass or lymphadenopathy.    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 Decrement was about 40% at the facial-nasalis.      Past Medical History:   Myasthenia gravis    Past Surgical History:  Sinus surgery    Family history:    There is no known family history of myopathy or other neuromuscular disorders.    Social History:    He denies tobacco, alcohol, or illicit drug use.     Medical Allergies:  NKDA     Current Medications:    Current  Outpatient Prescriptions   Medication     predniSONE (DELTASONE) 10 MG tablet     CELLCEPT (BRAND) 500 MG TABLET     pyridostigmine (MESTINON) 60 MG tablet     No current facility-administered medications for this visit.      Review of Systems: A complete review of systems was obtained and was negative except for what was noted above.    Physical examination:      General Appearance: NAD    Skin: There are no rashes or other skin lesions.    Musculoskeletal:  There is no scoliosis, lordosis, kyphosis, pes cavus, or hammertoes.    Neurologic examination:    Mental status:  Patient is alert, attentive, and oriented x 3.  Language is coherent and fluent without dysarthria or aphasia.  Memory, comprehension and ability to follow commands were intact.       Cranial nerves: There is no ptosis at baseline but right eyelid ptosis does emerge after brief forced eye closure. No diplopia at mid position but he has diplopia after a few seconds of right and left gaze. Face and tongue strong.     Motor exam: No atrophy or fasciculations.   Manual muscle testing revealed the following MRC grade muscle power:   Right Left   Neck flexion 5    Neck extension: 5    Shoulder abduction:  5 5   Elbow extension: 5 5   Elbow flexion:  5 5   Wrist flexion:  5 5   Wrist extension:  5 5   FDI 5 5   Hip flexion 5 5   Knee extension 5 5   Dorsiflexion 5 5   Plantar flexion 5 5     Complex motor skills: No tremor or ataxia     Sensory exam: Normal vibration, light touch, and pin proximally and distally in the arms and legs bilaterally.     Gait was narrow and stable.     Deep tendon reflexes:   Right Left   Triceps 2 2   Biceps 2 2   Brachioradialis 2 2   Knee jerk 2 2   Ankle jerk 2 2   Plantar responses were flexor bilaterally.       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 Since increase prednisone to 40 mg he has improved. Signs remain on examination but  symptoms are much better. Will hold prednisone at current dose. He has now been taking MMF for about 3 months. If symptoms stable/improved at his next visit (will then be 5-6 months on MMF) will start slow prednisone taper.  Referral for thymectomy is also a possibility once symptoms are more stable and prednisone is weaned to less than 20 mg daily. All questions answered. He agrees to the plan below.      Plan:      1. Mestinon: continue 60 mg TID  2. Continue prednisone 40 mg daily. If he remains stable at next visit anticipate starting slow prednisone weaning.   3. Continue vitamin D 800 IU and calcium 1,000mg daily  4. Continue mycophenolate 1000 mg BID. Monitoring labs at next visit: CBC and chemistry   5. Continue GI prophylaxis (pepcid 20 mg daily)  6. Referral to thoracic surgery for thymectomy possible at some point pending clinical course  7. If he develops inability to swallow of shortness of breath he should seek urgent medical attention and then let me know. If he develops worsening ocular symptoms, dysarthria, or limb weakness he should let me know ASAP.   8. Follow up with me in 8 weeks. Sooner if needed.   ---

## 2018-03-06 NOTE — LETTER
3/6/2018       RE: David Vera  [address] [place] [address]     Dear Colleague,    Thank you for referring your patient, David Vera, to the [facility] NEUROSPECIALTIES at [facility]. Please see a copy of my visit note below.    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 In 2017 he developed drooping of the left eyelid. In August he developed diplopia. In  he developed limb weakness. No shortness of breath. In early  he started mestinon, which helped ptosis. In mid  he started prednisone: 10 mg daily and then increasing to 20 mg daily after about a week. After prednisone was started he reported less eyelid ptosis and overall improved strength - but not normal. He increased to 30 mg in . In  he also started mycophenolate.     Interval history:  I last saw him 18. His current dose of prednisone is 40 mg daily. He has been at this dose for about 1 month. Within the last week he has noticed major improvements in his MG. He notices that when he turns his head quickly it takes some time to focus, but otherwise his vision has resolved. He does not really have diplopia anymore. No ptosis. No dysarthria, dysphagia or shortness of breath. No limb weakness. He was able to go for a 5 mile run this week. He still mycophenolate 1000 mg BID. He also takes mestinon 60 mg QID. He thinks this is still helpful.     Prior pertinent laboratory work-up:  : AChR binding ab 0.7 nmol/L  (N < 0.4). TSH normal.     Prior imagin/17: CT chest showed no evidence of thymoma. No mediastinal mass or lymphadenopathy.    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 Decrement was about 40% at the facial-nasalis.      Past Medical History:   Myasthenia  gravis    Past Surgical History:  Sinus surgery    Family history:    There is no known family history of myopathy or other neuromuscular disorders.    Social History:    He denies tobacco, alcohol, or illicit drug use.     Medical Allergies:  NKDA     Current Medications:    Current Outpatient Prescriptions   Medication     predniSONE (DELTASONE) 10 MG tablet     CELLCEPT (BRAND) 500 MG TABLET     pyridostigmine (MESTINON) 60 MG tablet     No current facility-administered medications for this visit.      Review of Systems: A complete review of systems was obtained and was negative except for what was noted above.    Physical examination:      General Appearance: NAD    Skin: There are no rashes or other skin lesions.    Musculoskeletal:  There is no scoliosis, lordosis, kyphosis, pes cavus, or hammertoes.    Neurologic examination:    Mental status:  Patient is alert, attentive, and oriented x 3.  Language is coherent and fluent without dysarthria or aphasia.  Memory, comprehension and ability to follow commands were intact.       Cranial nerves: There is no ptosis at baseline but right eyelid ptosis does emerge after brief forced eye closure. No diplopia at mid position but he has diplopia after a few seconds of right and left gaze. Face and tongue strong.     Motor exam: No atrophy or fasciculations.   Manual muscle testing revealed the following MRC grade muscle power:   Right Left   Neck flexion 5    Neck extension: 5    Shoulder abduction:  5 5   Elbow extension: 5 5   Elbow flexion:  5 5   Wrist flexion:  5 5   Wrist extension:  5 5   FDI 5 5   Hip flexion 5 5   Knee extension 5 5   Dorsiflexion 5 5   Plantar flexion 5 5     Complex motor skills: No tremor or ataxia     Sensory exam: Normal vibration, light touch, and pin proximally and distally in the arms and legs bilaterally.     Gait was narrow and stable.     Deep tendon reflexes:   Right Left   Triceps 2 2   Biceps 2 2   Brachioradialis 2 2   Knee jerk  2 2   Ankle jerk 2 2   Plantar responses were flexor bilaterally.       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 Since increase prednisone to 40 mg he has improved. Signs remain on examination but symptoms are much better. Will hold prednisone at current dose. He has now been taking MMF for about 3 months. If symptoms stable/improved at his next visit (will then be 5-6 months on MMF) will start slow prednisone taper.  Referral for thymectomy is also a possibility once symptoms are more stable and prednisone is weaned to less than 20 mg daily. All questions answered. He agrees to the plan below.      Plan:      1. Mestinon: continue 60 mg TID  2. Continue prednisone 40 mg daily. If he remains stable at next visit anticipate starting slow prednisone weaning.   3. Continue vitamin D 800 IU and calcium 1,000mg daily  4. Continue mycophenolate 1000 mg BID. Monitoring labs at next visit: CBC and chemistry   5. Continue GI prophylaxis (pepcid 20 mg daily)  6. Referral to thoracic surgery for thymectomy possible at some point pending clinical course  7. If he develops inability to swallow of shortness of breath he should seek urgent medical attention and then let me know. If he develops worsening ocular symptoms, dysarthria, or limb weakness he should let me know ASAP.   8. Follow up with me in 8 weeks. Sooner if needed.   ---    Sincerely,    Go Jewell MD

## 2018-03-07 RX ORDER — PREDNISONE 10 MG/1
40 TABLET ORAL DAILY
Qty: 120 TABLET | Refills: 1 | Status: SHIPPED | OUTPATIENT
Start: 2018-03-07 | End: 2019-02-07

## 2018-04-11 DIAGNOSIS — G70.00 MYASTHENIA GRAVIS WITHOUT EXACERBATION (H): Primary | ICD-10-CM

## 2018-04-11 RX ORDER — MYCOPHENOLATE MOFETIL 500 MG/1
TABLET ORAL
Qty: 120 TABLET | Refills: 1 | Status: SHIPPED | OUTPATIENT
Start: 2018-04-11 | End: 2018-06-06

## 2018-05-08 ENCOUNTER — OFFICE VISIT (OUTPATIENT)
Dept: NEUROLOGY | Facility: CLINIC | Age: 58
End: 2018-05-08
Payer: COMMERCIAL

## 2018-05-08 VITALS
BODY MASS INDEX: 22.15 KG/M2 | TEMPERATURE: 97.5 F | HEART RATE: 80 BPM | SYSTOLIC BLOOD PRESSURE: 110 MMHG | WEIGHT: 142.8 LBS | DIASTOLIC BLOOD PRESSURE: 64 MMHG | RESPIRATION RATE: 16 BRPM

## 2018-05-08 DIAGNOSIS — G70.00 MYASTHENIA GRAVIS WITHOUT EXACERBATION (H): Primary | ICD-10-CM

## 2018-05-08 LAB
ALT SERPL W P-5'-P-CCNC: 26 U/L (ref 0–70)
AST SERPL W P-5'-P-CCNC: 14 U/L (ref 0–45)
BUN SERPL-MCNC: 17 MG/DL (ref 7–30)
CREAT SERPL-MCNC: 0.91 MG/DL (ref 0.66–1.25)
GFR SERPL CREATININE-BSD FRML MDRD: 86 ML/MIN/1.7M2

## 2018-05-08 ASSESSMENT — PAIN SCALES - GENERAL: PAINLEVEL: NO PAIN (0)

## 2018-05-08 NOTE — MR AVS SNAPSHOT
After Visit Summary   5/8/2018    David Vera    MRN: [id number]           Patient Information     Date Of Birth          1960        Visit Information        Provider Department      5/8/2018 2:30 PM Go Jewell MD [facility] NEUROSPECIALTIES        Today's Diagnoses     Myasthenia gravis without exacerbation (H)    -  1      Care Instructions    Decrease prednisone from 40 mg daily to 35 mg x 2 weeks then 30 mg x 2 weeks then 25 mg x 2 weeks then 20 mg x 2 weeks.    Do not go below 20 mg before we meet next.     You can decrease mestinon to twice daily as tolerated.           Follow-ups after your visit        Follow-up notes from your care team     Return in about 10 weeks (around 7/17/2018).      Your next 10 appointments already scheduled     Jul 17, 2018  2:30 PM CDT   Return Muscular Dystrophy with Go Jewell MD   [facility] NEUROSPECIALTIES ([facility])    [address] [address] [phone]              Future tests that were ordered for you today     Open Future Orders        Priority Expected Expires Ordered    CBC with platelets differential Routine  5/8/2019 5/8/2018            Who to contact     Please call your clinic at [phone] to:    Ask questions about your health    Make or cancel appointments    Discuss your medicines    Learn about your test results    Speak to your doctor            Additional Information About Your Visit        Buzzillahart Information     Tintri gives you secure access to your electronic health record. If you see a primary care provider, you can also send messages to your care team and make appointments. If you have questions, please call your primary care clinic.  If you do not have a primary care provider, please call [phone] and they will assist you.      Tintri is an electronic gateway that provides easy, online access to your medical records. With Tintri, you can request a clinic  appointment, read your test results, renew a prescription or communicate with your care team.     To access your existing account, please contact your [facility] or call [phone] for assistance.        Care EveryWhere ID     This is your Care EveryWhere ID. This could be used by other organizations to access your [facility] medical records  [id number]        Your Vitals Were     Pulse Temperature Respirations BMI (Body Mass Index)          80 97.5  F (36.4  C) 16 22.15 kg/m2         Blood Pressure from Last 3 Encounters:   05/08/18 110/64   03/06/18 115/68   01/23/18 135/83    Weight from Last 3 Encounters:   05/08/18 142 lb 12.8 oz (64.8 kg)   03/06/18 146 lb 3.2 oz (66.3 kg)   01/23/18 142 lb (64.4 kg)              We Performed the Following     ALT     AST (SGOT)     Creatinine     Urea nitrogen (BUN)        Primary Care Provider Office Phone # Fax #    Jamarcus Montano [phone]       [facility] [address] [facility] [address]        Equal Access to Services     [facility] : Hadii aad ku hadasho Soinga, waaxda luqadaha, qaybta kaalmada cindy, brown agustin . So [facility] [phone].    ATENCIÓN: Si habla español, tiene a salas disposición servicios gratuitos de asistencia lingüística. LlCincinnati [facility] [phone].    We comply with applicable federal civil rights laws and [place] laws. We do not discriminate on the basis of race, color, national origin, age, disability, sex, sexual orientation, or gender identity.            Thank you!     Thank you for choosing [facility] NEUROSPECIALTIES  for your care. Our goal is always to provide you with excellent care. Hearing back from our patients is one way we can continue to improve our services. Please take a few minutes to complete the written survey that you may receive in the mail after your visit with us. Thank you!             Your Updated Medication List - Protect others  around you: Learn how to safely use, store and throw away your medicines at www.disposemymeds.org.          This list is accurate as of 5/8/18  3:26 PM.  Always use your most recent med list.                   Brand Name Dispense Instructions for use Diagnosis    * mycophenolate 500 MG tablet     120 tablet    Take 2 tablets (1,000 mg) by mouth 2 times daily    Myasthenia gravis without exacerbation (H)       * mycophenolate 500 MG tablet    GENERIC EQUIVALENT    120 tablet    TAKE 2 TABLETS BY MOUTH TWICE DAILY    Myasthenia gravis without exacerbation (H)       * predniSONE 10 MG tablet    DELTASONE    120 tablet    Take 1 tablet (10 mg) by mouth 4 times daily    Myasthenia gravis without exacerbation (H)       * predniSONE 10 MG tablet    DELTASONE    120 tablet    Take 4 tablets (40 mg) by mouth daily    Myasthenia gravis with exacerbation (H)       pyridostigmine 60 MG tablet    MESTINON    180 tablet    Take 2 tablets (120 mg) by mouth 3 times daily    Ophthalmoplegia       * Notice:  This list has 4 medication(s) that are the same as other medications prescribed for you. Read the directions carefully, and ask your doctor or other care provider to review them with you.

## 2018-05-08 NOTE — PROGRESS NOTES
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 In 2017 he developed drooping of the left eyelid. In August he developed diplopia. In  he developed limb weakness. No shortness of breath. In early  he started mestinon, which helped ptosis. In mid  he started prednisone: 10 mg daily and then increasing to 20 mg daily after about a week. After prednisone was started he reported less eyelid ptosis and overall improved strength - but not normal. He increased to 40 mg in early . In  he also started mycophenolate.     Interval history:  I last saw him 3/6/18. He is doing great. He reports no diplopia or ptosis at all. No dysarthria, dysphagia or SOB. No limb limb weakness. He exercises daily. He can run 7 miles. His current dose of prednisone is 40 mg daily. He still mycophenolate 1000 mg BID. He also takes mestinon 60 mg TID. He never misses a dose of mestinon. He does not notice if he is due for mestinon and also does not experience any improvement after he takes a dose.    Prior pertinent laboratory work-up:  : AChR binding ab 0.7 nmol/L  (N < 0.4). TSH normal.     Prior imagin/17: CT chest showed no evidence of thymoma. No mediastinal mass or lymphadenopathy.    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 Decrement was about 40% at the facial-nasalis.      Past Medical History:   Myasthenia gravis    Past Surgical History:  Sinus surgery    Family history:    There is no known family history of myopathy or other neuromuscular disorders.    Social History:    He denies tobacco, alcohol, or illicit drug use.     Medical Allergies:  NKDA     Current Medications:    Current Outpatient Prescriptions   Medication     CELLCEPT (BRAND) 500 MG TABLET     mycophenolate (GENERIC EQUIVALENT) 500 MG  tablet     predniSONE (DELTASONE) 10 MG tablet     predniSONE (DELTASONE) 10 MG tablet     pyridostigmine (MESTINON) 60 MG tablet     No current facility-administered medications for this visit.      Review of Systems: A review of systems was obtained and was negative except for what was noted above.    Physical examination:    /64 (BP Location: Right arm, Patient Position: Chair, Cuff Size: Adult Regular)  Pulse 80  Temp 97.5  F (36.4  C)  Resp 16  Wt 64.8 kg (142 lb 12.8 oz)  BMI 22.15 kg/m2    General Appearance: NAD    Skin: There are no rashes or other skin lesions.    Musculoskeletal:  There is no scoliosis, lordosis, kyphosis, pes cavus, or hammertoes.    Neurologic examination:    Mental status:  Patient is alert, attentive, and oriented x 3.  Language is coherent and fluent without dysarthria or aphasia.  Memory, comprehension and ability to follow commands were intact.       Cranial nerves: No ptosis and eye closure is strong. There is some very mild right ptosis after sustained eye closure. No diplopia even after sustained upgaze. Face and tongue strong.     Motor exam: No atrophy or fasciculations.   Manual muscle testing revealed the following MRC grade muscle power:   Right Left   Neck flexion 5    Neck extension: 5    Shoulder abduction:  5 5   Elbow extension: 5 5   Elbow flexion:  5 5   Wrist flexion:  5 5   Wrist extension:  5 5   FDI 5 5   Hip flexion 5 5   Knee extension 5 5   Dorsiflexion 5 5   Plantar flexion 5 5     Complex motor skills: No tremor or ataxia     Sensory exam: Normal vibration and pin proximally and distally in the arms and legs bilaterally.     Gait narrow and stable.     Deep tendon reflexes:   Right Left   Triceps 2 2   Biceps 2 2   Brachioradialis 2 2   Knee jerk 2 2   Ankle jerk 2 2   Plantar responses were flexor bilaterally.       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 He has now  been at prednisone 40 mg daily for about 3 months and mycophenolate for about 6 months. Presently he has only very mild residual deficits on examination. Will start slow prednisone taper.  Referral for thymectomy remains a possibility once symptoms are more stable and prednisone is weaned to less than 20 mg daily. All questions answered. He agrees to the plan below.      Plan:      1. Mestinon: He can decrease to 60 mg BID as tolerated  2. Decrease prednisone from 40 mg daily to 35 mg x 2 weeks then 30 mg x 2 weeks then 25 mg x 2 weeks then 20 mg x 2 weeks. Hold at 20 until we meet next. Slower taper from there if he is stable.    3. Continue vitamin D 800 IU and calcium 1,000mg daily  4. Continue mycophenolate 1000 mg BID. Monitoring labs today: CBC and chemistry   5. Continue GI prophylaxis (pepcid 20 mg daily)  6. Referral to thoracic surgery for thymectomy possible at some point pending clinical course  7. If he develops inability to swallow of shortness of breath he should seek urgent medical attention and then let me know. If he develops worsening ocular symptoms, dysarthria, or limb weakness he should let me know ASAP.   8. Follow up with me in 10 weeks. Sooner if needed.   ---

## 2018-05-08 NOTE — PATIENT INSTRUCTIONS
Decrease prednisone from 40 mg daily to 35 mg x 2 weeks then 30 mg x 2 weeks then 25 mg x 2 weeks then 20 mg x 2 weeks.    Do not go below 20 mg before we meet next.     You can decrease mestinon to twice daily as tolerated.

## 2018-05-08 NOTE — LETTER
2018       RE: David Vera  [address] [place] [address]     Dear Colleague,    Thank you for referring your patient, David Vera, to the [facility] NEUROSPECIALTIES at [facility]. Please see a copy of my visit note below.    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 In 2017 he developed drooping of the left eyelid. In August he developed diplopia. In  he developed limb weakness. No shortness of breath. In early  he started mestinon, which helped ptosis. In mid  he started prednisone: 10 mg daily and then increasing to 20 mg daily after about a week. After prednisone was started he reported less eyelid ptosis and overall improved strength - but not normal. He increased to 40 mg in early . In  he also started mycophenolate.     Interval history:  I last saw him 3/6/18. He is doing great. He reports no diplopia or ptosis at all. No dysarthria, dysphagia or SOB. No limb limb weakness. He exercises daily. He can run 7 miles. His current dose of prednisone is 40 mg daily. He still mycophenolate 1000 mg BID. He also takes mestinon 60 mg TID. He never misses a dose of mestinon. He does not notice if he is due for mestinon and also does not experience any improvement after he takes a dose.    Prior pertinent laboratory work-up:  : AChR binding ab 0.7 nmol/L  (N < 0.4). TSH normal.     Prior imagin/17: CT chest showed no evidence of thymoma. No mediastinal mass or lymphadenopathy.    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 Decrement was about 40% at the facial-nasalis.      Past Medical History:   Myasthenia gravis    Past Surgical History:  Sinus surgery    Family history:    There is no known family history of myopathy or  other neuromuscular disorders.    Social History:    He denies tobacco, alcohol, or illicit drug use.     Medical Allergies:  NKDA     Current Medications:    Current Outpatient Prescriptions   Medication     CELLCEPT (BRAND) 500 MG TABLET     mycophenolate (GENERIC EQUIVALENT) 500 MG tablet     predniSONE (DELTASONE) 10 MG tablet     predniSONE (DELTASONE) 10 MG tablet     pyridostigmine (MESTINON) 60 MG tablet     No current facility-administered medications for this visit.      Review of Systems: A review of systems was obtained and was negative except for what was noted above.    Physical examination:    /64 (BP Location: Right arm, Patient Position: Chair, Cuff Size: Adult Regular)  Pulse 80  Temp 97.5  F (36.4  C)  Resp 16  Wt 64.8 kg (142 lb 12.8 oz)  BMI 22.15 kg/m2    General Appearance: NAD    Skin: There are no rashes or other skin lesions.    Musculoskeletal:  There is no scoliosis, lordosis, kyphosis, pes cavus, or hammertoes.    Neurologic examination:    Mental status:  Patient is alert, attentive, and oriented x 3.  Language is coherent and fluent without dysarthria or aphasia.  Memory, comprehension and ability to follow commands were intact.       Cranial nerves: No ptosis and eye closure is strong. There is some very mild right ptosis after sustained eye closure. No diplopia even after sustained upgaze. Face and tongue strong.     Motor exam: No atrophy or fasciculations.   Manual muscle testing revealed the following MRC grade muscle power:   Right Left   Neck flexion 5    Neck extension: 5    Shoulder abduction:  5 5   Elbow extension: 5 5   Elbow flexion:  5 5   Wrist flexion:  5 5   Wrist extension:  5 5   FDI 5 5   Hip flexion 5 5   Knee extension 5 5   Dorsiflexion 5 5   Plantar flexion 5 5     Complex motor skills: No tremor or ataxia     Sensory exam: Normal vibration and pin proximally and distally in the arms and legs bilaterally.     Gait narrow and stable.     Deep tendon  reflexes:   Right Left   Triceps 2 2   Biceps 2 2   Brachioradialis 2 2   Knee jerk 2 2   Ankle jerk 2 2   Plantar responses were flexor bilaterally.       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 He has now been at prednisone 40 mg daily for about 3 months and mycophenolate for about 6 months. Presently he has only very mild residual deficits on examination. Will start slow prednisone taper.  Referral for thymectomy remains a possibility once symptoms are more stable and prednisone is weaned to less than 20 mg daily. All questions answered. He agrees to the plan below.      Plan:      1. Mestinon: He can decrease to 60 mg BID as tolerated  2. Decrease prednisone from 40 mg daily to 35 mg x 2 weeks then 30 mg x 2 weeks then 25 mg x 2 weeks then 20 mg x 2 weeks. Hold at 20 until we meet next. Slower taper from there if he is stable.    3. Continue vitamin D 800 IU and calcium 1,000mg daily  4. Continue mycophenolate 1000 mg BID. Monitoring labs today: CBC and chemistry   5. Continue GI prophylaxis (pepcid 20 mg daily)  6. Referral to thoracic surgery for thymectomy possible at some point pending clinical course  7. If he develops inability to swallow of shortness of breath he should seek urgent medical attention and then let me know. If he develops worsening ocular symptoms, dysarthria, or limb weakness he should let me know ASAP.   8. Follow up with me in 10 weeks. Sooner if needed.   ---      Again, thank you for allowing me to participate in the care of your patient.      Sincerely,    Go Jewell MD

## 2018-06-06 DIAGNOSIS — G70.00 MYASTHENIA GRAVIS WITHOUT EXACERBATION (H): ICD-10-CM

## 2018-06-06 RX ORDER — MYCOPHENOLATE MOFETIL 500 MG/1
TABLET ORAL
Qty: 120 TABLET | Refills: 2 | Status: SHIPPED | OUTPATIENT
Start: 2018-06-06 | End: 2018-08-20

## 2018-07-17 ENCOUNTER — OFFICE VISIT (OUTPATIENT)
Dept: NEUROLOGY | Facility: CLINIC | Age: 58
End: 2018-07-17
Payer: COMMERCIAL

## 2018-07-17 VITALS
HEART RATE: 88 BPM | BODY MASS INDEX: 22.37 KG/M2 | TEMPERATURE: 97.6 F | SYSTOLIC BLOOD PRESSURE: 141 MMHG | WEIGHT: 144.2 LBS | DIASTOLIC BLOOD PRESSURE: 80 MMHG

## 2018-07-17 DIAGNOSIS — G70.00 MYASTHENIA GRAVIS WITHOUT EXACERBATION (H): Primary | ICD-10-CM

## 2018-07-17 RX ORDER — PREDNISONE 5 MG/1
5 TABLET ORAL 2 TIMES DAILY
Qty: 30 TABLET | Refills: 1 | Status: SHIPPED | OUTPATIENT
Start: 2018-07-17 | End: 2020-03-10

## 2018-07-17 ASSESSMENT — PAIN SCALES - GENERAL: PAINLEVEL: NO PAIN (0)

## 2018-07-17 NOTE — PATIENT INSTRUCTIONS
Reduce prednisone to:  1. 17.5 mg x 2 weeks, then  2. 15 mg x 2 weeks, then  3. 12.5 mg x 2 weeks, then  4. 10 mg x 2 weeks    Do not go below 10 mg until we meet next,

## 2018-07-17 NOTE — PROGRESS NOTES
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 In 2017 he developed drooping of the left eyelid. In August he developed diplopia. In  he developed limb weakness. No shortness of breath. In early  he started mestinon, which helped ptosis. In mid  he started prednisone: 10 mg daily and then increasing to 20 mg daily after about a week. After prednisone was started he reported less eyelid ptosis and overall improved strength - but not normal. He increased to 40 mg in early . In  he also started mycophenolate. Prednisone was slowly tapered to 20 mg daily in 2018.     Interval history:  I last saw him 18. He is doing well. He has tapered prednisone down to 20 mg daily. He also takes MMF (began ). He also takes mestinon 3 times daily. Sometimes he misses it. No worsening of symptoms when he misses a dose. He reports no diplopia or ptosis other than rare diplopia at night when lying in bed.  No dysarthria, dysphagia or SOB. No limb limb weakness. He exercises daily. He can run 7 miles. He dose this at least twice weakly    Prior pertinent laboratory work-up:  : AChR binding ab 0.7 nmol/L  (N < 0.4). TSH normal.     Prior imagin/17: CT chest showed no evidence of thymoma. No mediastinal mass or lymphadenopathy.    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 Decrement was about 40% at the facial-nasalis.      Past Medical History:   Myasthenia gravis    Past Surgical History:  Sinus surgery    Family history:    There is no known family history of myopathy or other neuromuscular disorders.    Social History:    He denies tobacco, alcohol, or illicit drug use.     Medical Allergies:  NKDA     Current Medications:    Current Outpatient Prescriptions   Medication     CELLCEPT (BRAND)  500 MG TABLET     predniSONE (DELTASONE) 10 MG tablet     predniSONE (DELTASONE) 10 MG tablet     pyridostigmine (MESTINON) 60 MG tablet     mycophenolate (GENERIC EQUIVALENT) 500 MG tablet     No current facility-administered medications for this visit.      Review of Systems: A review of systems was obtained and was negative except for what was noted above.    Physical examination:    /80 (BP Location: Right arm, Patient Position: Chair, Cuff Size: Adult Regular)  Pulse 88  Temp 97.6  F (36.4  C)  Wt 65.4 kg (144 lb 3.2 oz)  BMI 22.37 kg/m2    General Appearance: NAD    Skin: There are no rashes or other skin lesions.    Musculoskeletal:  There is no scoliosis, lordosis, kyphosis, pes cavus, or hammertoes.    Neurologic examination:    Mental status:  Patient is alert, attentive, and oriented x 3.  Language is coherent and fluent without dysarthria or aphasia.  Memory, comprehension and ability to follow commands were intact.       Cranial nerves: No ptosis at baseline. Perhaps some mild right ptosis after prolonged up gaze. Eye closure is strong. No diplopia even after sustained upgaze. Face and tongue strong.     Motor exam: No atrophy or fasciculations.   Manual muscle testing revealed the following MRC grade muscle power:   Right Left   Neck flexion 5    Neck extension: 5    Shoulder abduction:  5 5   Elbow extension: 5 5   Elbow flexion:  5 5   Wrist flexion:  5 5   Wrist extension:  5 5   FDI 5 5   Hip flexion 5 5   Knee extension 5 5   Dorsiflexion 5 5   Plantar flexion 5 5     Complex motor skills: No tremor or ataxia     Sensory exam: Normal vibration and pin proximally and distally in the arms and legs bilaterally.     Gait narrow and stable.     Deep tendon reflexes:   Right Left   Triceps 2 2   Biceps 2 2   Brachioradialis 2 2   Knee jerk 2 2   Ankle jerk 2 2        Assessment:    David Vera is a 57 year old man with 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 He  continues to do well with the prednisone taper. Our goal is to transition to mycophenolate monotherapy (now on MMF for about 8 months). Referral for thymectomy remains a possibility at some point, perhaps at next visit if he remains stable. All questions answered. He agrees to the plan below.      Plan:      1. Mestinon: He can decrease to 60 mg BID, qD or off as tolerated  2. Decrease prednisone from 20 mg daily to 17.5 mg x 2 weeks then 15 mg x 2 weeks then 12.5 mg x 2 weeks then 10 mg x 2 weeks. Hold at 10 mg until we meet next. Slower taper from there if he is stable.    3. Continue vitamin D 800 IU and calcium 1,000mg daily. Continue GI prophylaxis (pepcid 20 mg daily)  4. Continue mycophenolate 1000 mg BID. Monitoring labs last check 5/18. None needed today.   5. Referral to thoracic surgery for thymectomy possible at next visit if he remains well controlled and prednisone is down to 10 mg.   6. If he develops inability to swallow of shortness of breath he should seek urgent medical attention and then let me know. If he develops worsening ocular symptoms, dysarthria, or limb weakness he should let me know ASAP.   7. Follow up with me in 3 months. Sooner if needed.   ---  10/8/18: Note received from David. He reports that on 10/1/18 he developed some mild diplopia on the right. He reduced prednisone to 10 mg daily on 8/28/18. No new ptosis or other MG symptoms. I will see him back in clinic 10/23/18. Will hold pred at current dose for now, but if it worsens or if he develops new symptoms he will let me know.

## 2018-07-17 NOTE — LETTER
2018       RE: David Vera  [address] [place] [address]     Dear Colleague,    Thank you for referring your patient, David Vera, to the [facility] at [facility]. Please see a copy of my visit note below.    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 In 2017 he developed drooping of the left eyelid. In August he developed diplopia. In  he developed limb weakness. No shortness of breath. In early  he started mestinon, which helped ptosis. In mid  he started prednisone: 10 mg daily and then increasing to 20 mg daily after about a week. After prednisone was started he reported less eyelid ptosis and overall improved strength - but not normal. He increased to 40 mg in early . In  he also started mycophenolate. Prednisone was slowly tapered to 20 mg daily in 2018.     Interval history:  I last saw him 18. He is doing well. He has tapered prednisone down to 20 mg daily. He also takes MMF (began ). He also takes mestinon 3 times daily. Sometimes he misses it. No worsening of symptoms when he misses a dose. He reports no diplopia or ptosis other than rare diplopia at night when lying in bed.  No dysarthria, dysphagia or SOB. No limb limb weakness. He exercises daily. He can run 7 miles. He dose this at least twice weakly    Prior pertinent laboratory work-up:  : AChR binding ab 0.7 nmol/L  (N < 0.4). TSH normal.     Prior imagin/17: CT chest showed no evidence of thymoma. No mediastinal mass or lymphadenopathy.    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 Decrement was about 40% at the facial-nasalis.      Past Medical History:   Myasthenia gravis    Past Surgical History:  Sinus  surgery    Family history:    There is no known family history of myopathy or other neuromuscular disorders.    Social History:    He denies tobacco, alcohol, or illicit drug use.     Medical Allergies:  NKDA     Current Medications:    Current Outpatient Prescriptions   Medication     CELLCEPT (BRAND) 500 MG TABLET     predniSONE (DELTASONE) 10 MG tablet     predniSONE (DELTASONE) 10 MG tablet     pyridostigmine (MESTINON) 60 MG tablet     mycophenolate (GENERIC EQUIVALENT) 500 MG tablet     No current facility-administered medications for this visit.      Review of Systems: A review of systems was obtained and was negative except for what was noted above.    Physical examination:    /80 (BP Location: Right arm, Patient Position: Chair, Cuff Size: Adult Regular)  Pulse 88  Temp 97.6  F (36.4  C)  Wt 65.4 kg (144 lb 3.2 oz)  BMI 22.37 kg/m2    General Appearance: NAD    Skin: There are no rashes or other skin lesions.    Musculoskeletal:  There is no scoliosis, lordosis, kyphosis, pes cavus, or hammertoes.    Neurologic examination:    Mental status:  Patient is alert, attentive, and oriented x 3.  Language is coherent and fluent without dysarthria or aphasia.  Memory, comprehension and ability to follow commands were intact.       Cranial nerves: No ptosis at baseline. Perhaps some mild right ptosis after prolonged up gaze. Eye closure is strong. No diplopia even after sustained upgaze. Face and tongue strong.     Motor exam: No atrophy or fasciculations.   Manual muscle testing revealed the following MRC grade muscle power:   Right Left   Neck flexion 5    Neck extension: 5    Shoulder abduction:  5 5   Elbow extension: 5 5   Elbow flexion:  5 5   Wrist flexion:  5 5   Wrist extension:  5 5   FDI 5 5   Hip flexion 5 5   Knee extension 5 5   Dorsiflexion 5 5   Plantar flexion 5 5     Complex motor skills: No tremor or ataxia     Sensory exam: Normal vibration and pin proximally and distally in the arms  and legs bilaterally.     Gait narrow and stable.     Deep tendon reflexes:   Right Left   Triceps 2 2   Biceps 2 2   Brachioradialis 2 2   Knee jerk 2 2   Ankle jerk 2 2        Assessment:    David Vera is a 57 year old man with 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 He continues to do well with the prednisone taper. Our goal is to transition to mycophenolate monotherapy (now on MMF for about 8 months). Referral for thymectomy remains a possibility at some point, perhaps at next visit if he remains stable. All questions answered. He agrees to the plan below.      Plan:      1. Mestinon: He can decrease to 60 mg BID, qD or off as tolerated  2. Decrease prednisone from 20 mg daily to 17.5 mg x 2 weeks then 15 mg x 2 weeks then 12.5 mg x 2 weeks then 10 mg x 2 weeks. Hold at 10 mg until we meet next. Slower taper from there if he is stable.    3. Continue vitamin D 800 IU and calcium 1,000mg daily. Continue GI prophylaxis (pepcid 20 mg daily)  4. Continue mycophenolate 1000 mg BID. Monitoring labs last check 5/18. None needed today.   5. Referral to thoracic surgery for thymectomy possible at next visit if he remains well controlled and prednisone is down to 10 mg.   6. If he develops inability to swallow of shortness of breath he should seek urgent medical attention and then let me know. If he develops worsening ocular symptoms, dysarthria, or limb weakness he should let me know ASAP.   7. Follow up with me in 3 months. Sooner if needed.   ---      Again, thank you for allowing me to participate in the care of your patient.      Sincerely,    Go Jewell MD

## 2018-07-17 NOTE — MR AVS SNAPSHOT
After Visit Summary   7/17/2018    David Vera    MRN: [id number]           Patient Information     Date Of Birth          1960        Visit Information        Provider Department      7/17/2018 2:30 PM Go Jewell MD [facility]        Today's Diagnoses     Myasthenia gravis without exacerbation (H)    -  1      Care Instructions    Reduce prednisone to:  1. 17.5 mg x 2 weeks, then  2. 15 mg x 2 weeks, then  3. 12.5 mg x 2 weeks, then  4. 10 mg x 2 weeks    Do not go below 10 mg until we meet next,           Follow-ups after your visit        Follow-up notes from your care team     Return in about 3 months (around 10/17/2018).      Your next 10 appointments already scheduled     Oct 23, 2018  2:30 PM CDT   Return Muscular Dystrophy with Go Jewell MD   [facility] ([facility])    [address] [id number] [phone]              Who to contact     Please call your clinic at [phone] to:    Ask questions about your health    Make or cancel appointments    Discuss your medicines    Learn about your test results    Speak to your doctor            Additional Information About Your Visit        [employer] Information     [email or web address] gives you secure access to your electronic health record. If you see a primary care provider, you can also send messages to your care team and make appointments. If you have questions, please call your primary care clinic.  If you do not have a primary care provider, please call [phone] and they will assist you.      [email or web address] is an electronic gateway that provides easy, online access to your medical records. With [email or web address], you can request a clinic appointment, read your test results, renew a prescription or communicate with your care team.     To access your existing account, please contact your [facility] or call [phone] for assistance.        Care  EveryWhere ID     This is your Care EveryWhere ID. This could be used by other organizations to access your [facility] medical records  [id number]        Your Vitals Were     Pulse Temperature BMI (Body Mass Index)             88 97.6  F (36.4  C) 22.37 kg/m2          Blood Pressure from Last 3 Encounters:   07/17/18 141/80   05/08/18 110/64   03/06/18 115/68    Weight from Last 3 Encounters:   07/17/18 144 lb 3.2 oz (65.4 kg)   05/08/18 142 lb 12.8 oz (64.8 kg)   03/06/18 146 lb 3.2 oz (66.3 kg)              Today, you had the following     No orders found for display         Today's Medication Changes          These changes are accurate as of 7/17/18  3:07 PM.  If you have any questions, ask your nurse or doctor.               These medicines have changed or have updated prescriptions.        Dose/Directions    * predniSONE 10 MG tablet   Commonly known as:  DELTASONE   This may have changed:  Another medication with the same name was added. Make sure you understand how and when to take each.   Used for:  Myasthenia gravis without exacerbation (H)   Changed by:  Go Jewell MD        Dose:  10 mg   Take 1 tablet (10 mg) by mouth 4 times daily   Quantity:  120 tablet   Refills:  3       * predniSONE 10 MG tablet   Commonly known as:  DELTASONE   This may have changed:  Another medication with the same name was added. Make sure you understand how and when to take each.   Used for:  Myasthenia gravis with exacerbation (H)   Changed by:  Go Jewell MD        Dose:  40 mg   Take 4 tablets (40 mg) by mouth daily   Quantity:  120 tablet   Refills:  1       * predniSONE 5 MG tablet   Commonly known as:  DELTASONE   This may have changed:  You were already taking a medication with the same name, and this prescription was added. Make sure you understand how and when to take each.   Used for:  Myasthenia gravis without exacerbation (H)   Changed by:  Go Jewell MD        Dose:  5 mg   Take 1 tablet (5 mg)  by mouth 2 times daily   Quantity:  30 tablet   Refills:  1       * Notice:  This list has 3 medication(s) that are the same as other medications prescribed for you. Read the directions carefully, and ask your doctor or other care provider to review them with you.         Where to get your medicines      These medications were sent to [id number] - [place] BISHOP - [address]  [address], [place] [address]     Phone:  [phone]     predniSONE 5 MG tablet                Primary Care Provider Office Phone # Fax #    Jamarcus Montano [phone]       [facility] [address]  [facility] [address]        Equal Access to Services     TEAGAN NOVA : Rodri Posey, wajames cooney, qafritzta kaalmada cindy, brown agustin . So [facility] [phone].    ATENCIÓN: Si habla español, tiene a salas disposición servicios gratuitos de asistencia lingüística. Llame al [phone].    We comply with applicable federal civil rights laws and [place] laws. We do not discriminate on the basis of race, color, national origin, age, disability, sex, sexual orientation, or gender identity.            Thank you!     Thank you for choosing [facility]  for your care. Our goal is always to provide you with excellent care. Hearing back from our patients is one way we can continue to improve our services. Please take a few minutes to complete the written survey that you may receive in the mail after your visit with us. Thank you!             Your Updated Medication List - Protect others around you: Learn how to safely use, store and throw away your medicines at www.disposemymeds.org.          This list is accurate as of 7/17/18  3:07 PM.  Always use your most recent med list.                   Brand Name Dispense Instructions for use Diagnosis    * mycophenolate 500 MG tablet     120 tablet    Take 2 tablets (1,000 mg) by  mouth 2 times daily    Myasthenia gravis without exacerbation (H)       * mycophenolate 500 MG tablet    GENERIC EQUIVALENT    120 tablet    TAKE 2 TABLETS BY MOUTH TWICE DAILY    Myasthenia gravis without exacerbation (H)       * predniSONE 10 MG tablet    DELTASONE    120 tablet    Take 1 tablet (10 mg) by mouth 4 times daily    Myasthenia gravis without exacerbation (H)       * predniSONE 10 MG tablet    DELTASONE    120 tablet    Take 4 tablets (40 mg) by mouth daily    Myasthenia gravis with exacerbation (H)       * predniSONE 5 MG tablet    DELTASONE    30 tablet    Take 1 tablet (5 mg) by mouth 2 times daily    Myasthenia gravis without exacerbation (H)       pyridostigmine 60 MG tablet    MESTINON    180 tablet    Take 2 tablets (120 mg) by mouth 3 times daily    Ophthalmoplegia       * Notice:  This list has 5 medication(s) that are the same as other medications prescribed for you. Read the directions carefully, and ask your doctor or other care provider to review them with you.

## 2018-08-20 DIAGNOSIS — G70.00 MYASTHENIA GRAVIS WITHOUT EXACERBATION (H): ICD-10-CM

## 2018-08-21 RX ORDER — MYCOPHENOLATE MOFETIL 500 MG/1
TABLET ORAL
Qty: 120 TABLET | Refills: 2 | Status: SHIPPED | OUTPATIENT
Start: 2018-08-21 | End: 2018-11-30

## 2018-10-23 ENCOUNTER — OFFICE VISIT (OUTPATIENT)
Dept: NEUROLOGY | Facility: CLINIC | Age: 58
End: 2018-10-23
Payer: COMMERCIAL

## 2018-10-23 VITALS
HEART RATE: 62 BPM | RESPIRATION RATE: 16 BRPM | BODY MASS INDEX: 22.46 KG/M2 | WEIGHT: 144.8 LBS | DIASTOLIC BLOOD PRESSURE: 81 MMHG | SYSTOLIC BLOOD PRESSURE: 138 MMHG

## 2018-10-23 DIAGNOSIS — G70.00 MYASTHENIA GRAVIS WITHOUT EXACERBATION (H): Primary | ICD-10-CM

## 2018-10-23 ASSESSMENT — PAIN SCALES - GENERAL: PAINLEVEL: NO PAIN (0)

## 2018-10-23 NOTE — PROGRESS NOTES
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 In 2017 he developed drooping of the left eyelid. In August he developed diplopia. In  he developed limb weakness. No shortness of breath. In early  he started mestinon, which helped ptosis. In mid  he started prednisone: 10 mg daily and then increasing to 20 mg daily after about a week. After prednisone was started he reported less eyelid ptosis and overall improved strength - but not normal. He increased to 40 mg in early . In  he also started mycophenolate. Prednisone was slowly tapered to 20 mg daily in 2018. By 18 he was at prednisone 10 mg daily.     Interval history:  I last saw him 18. He has been able to taper prednisone to 10 mg daily. He has been at this dose for about 2 months. In  he had an episode of diplopia that lasted for about a week. During that week sometimes when he looked to the right he developed diplopia. No infections or new meds during that time. No ptosis, dysarthria, or dysphagia. After a week the diplopia resolved. No SOB. No limb limb weakness. He still exercises daily. He rides 20 miles on the bike twice weekly and runs 6 miles twice weekly. He continues mestinon. He is not sure if it helps anything.     Prior pertinent laboratory work-up:  : AChR binding ab 0.7 nmol/L  (N < 0.4). TSH normal.     Prior imagin/17: CT chest showed no evidence of thymoma. No mediastinal mass or lymphadenopathy.    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 Decrement was about 40% at the facial-nasalis.      Past Medical History:   Myasthenia gravis    Past Surgical History:  Sinus surgery    Family history:    There is no known family history of myopathy or other neuromuscular disorders.    Social  History:    He denies tobacco, alcohol, or illicit drug use.     Medical Allergies:  NKDA     Current Medications:    Current Outpatient Prescriptions   Medication     CELLCEPT (BRAND) 500 MG TABLET     mycophenolate (GENERIC EQUIVALENT) 500 MG tablet     predniSONE (DELTASONE) 10 MG tablet     pyridostigmine (MESTINON) 60 MG tablet     predniSONE (DELTASONE) 10 MG tablet     predniSONE (DELTASONE) 5 MG tablet     No current facility-administered medications for this visit.      Review of Systems: A review of systems was obtained and was negative except for what was noted above.    Physical examination:    /81 (BP Location: Right arm, Patient Position: Chair, Cuff Size: Adult Regular)  Pulse 62  Resp 16  Wt 144 lb 12.8 oz (65.7 kg)  BMI 22.46 kg/m2    General Appearance: NAD    Skin: There are no rashes or other skin lesions.    Musculoskeletal:  There is no scoliosis, lordosis, kyphosis, pes cavus, or hammertoes.    Neurologic examination:    Mental status:  Patient is alert, attentive, and oriented x 3.  Language is coherent and fluent without dysarthria or aphasia.  Memory, comprehension and ability to follow commands were intact.       Cranial nerves: No ptosis at baseline. After about 10 seconds of upgaze he develops mild right eye ptosis and diplopia. Eye closure is strong. No diplopia even after sustained upgaze. Face and tongue strong.     Motor exam: No atrophy or fasciculations.   Manual muscle testing revealed the following MRC grade muscle power:   Right Left   Neck flexion 5    Neck extension: 5    Shoulder abduction:  5 5   Elbow extension: 5 5   Elbow flexion:  5 5   Wrist flexion:  5 5   Wrist extension:  5 5   FDI 5 5   Hip flexion 5 5   Knee extension 5 5   Dorsiflexion 5 5   Plantar flexion 5 5     Complex motor skills: No tremor or ataxia     Sensory exam: Normal vibration and pin in hands and feet    Gait narrow and stable.     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 He is doing well with the prednisone taper, but did have re-emergence of diplopia earlier this month. I can also detect a bit of fatigable ptosis and diplopia on examination today. For these reasons will hold at our current level of immunotherapy. If he is stable at next visit will taper prednisone further. If he is not stable then might increase mycophenolate to 2500 mg or 3000 mg daily. He agrees to the plan below.      Plan:      1. Mestinon: Probably not needed. Advised him to reduce to every day for a couple weeks and then stop if able.   2. Hold prednisone at 10 mg daily  3. Continue vitamin D 800 IU and calcium 1,000mg daily. Continue GI prophylaxis (pepcid 20 mg daily)  4. Continue mycophenolate 1000 mg BID. Monitoring labs last check 5/18. None needed today.   5. Discussed thymectomy. He is not interested at this time. If disease become more challenging to manage then will reconsider.   6. If he develops inability to swallow of shortness of breath he should seek urgent medical attention and then let me know. If he develops worsening ocular symptoms, dysarthria, or limb weakness he should let me know ASAP.   7. Follow up with me in 3 months. Sooner if needed.   ---  ADDENDUM:  11/12/18: Note received from David. He reports that diplopia has been present for the last week. It occurs at night and in the morning after waking up. After he takes mestinon it goes away. He increased Mestinon to five pills; 2 in the morning, 2 at midday, and 1 at dinner time - which has helped. I am going to hold his mmf and prednisone at the current doses for now, but if this pattern continues and certainly if it evolves to anything more than intermittent mild diplopia then will need to increase the immunotherapy vs revisit thymectomy (which he  "is becoming more open too).     12/17/18: Noted received from David: \"It  has been four weeks since I increase the pyridostigmine and my vision has improved to almost 100%. The double vision is minor and I only notice the double vision either when I wake up in the morning or sometimes when looking to the left.   Current Medication pyridostigmine 60 MG tablet (2 tables in the morning, 2 tables at midday, and 1 table at night), predniSONE 10 MG tablet (Once per day)   mycophenolate 500 MG tablet (2 tables 2 times daily).\" Will keep holding meds at same doses. I am still concerned that we might not be able to wean below pred 10. Will give this more time. I will see him back in January. If he keeps doing well may try to slowly wean pred further, but low threshold for increasing mmf vs thymectomy of symptoms persist (as these would suggest likelihood of successful taper below pred 1 is low).   "

## 2018-10-23 NOTE — LETTER
10/23/2018     RE: David Vera  [address] [place] [address]     Dear Colleague,    Thank you for referring your patient, David Vera, to the [facility] NEUROSPECIALTIES at [facility]. Please see a copy of my visit note below.    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 In 2017 he developed drooping of the left eyelid. In August he developed diplopia. In  he developed limb weakness. No shortness of breath. In early  he started mestinon, which helped ptosis. In mid  he started prednisone: 10 mg daily and then increasing to 20 mg daily after about a week. After prednisone was started he reported less eyelid ptosis and overall improved strength - but not normal. He increased to 40 mg in early . In  he also started mycophenolate. Prednisone was slowly tapered to 20 mg daily in 2018. By 18 he was at prednisone 10 mg daily.     Interval history:  I last saw him 18. He has been able to taper prednisone to 10 mg daily. He has been at this dose for about 2 months. In  he had an episode of diplopia that lasted for about a week. During that week sometimes when he looked to the right he developed diplopia. No infections or new meds during that time. No ptosis, dysarthria, or dysphagia. After a week the diplopia resolved. No SOB. No limb limb weakness. He still exercises daily. He rides 20 miles on the bike twice weekly and runs 6 miles twice weekly. He continues mestinon. He is not sure if it helps anything.     Prior pertinent laboratory work-up:  : AChR binding ab 0.7 nmol/L  (N < 0.4). TSH normal.     Prior imagin/17: CT chest showed no evidence of thymoma. No mediastinal mass or lymphadenopathy.    Prior electrophysiologic work-up:  : Nerve conduction studies/needle EMG showed evidence of a disorder of post-synaptic  neuromuscular junction transmission. Decrement was about 40% at the facial-nasalis.      Past Medical History:   Myasthenia gravis    Past Surgical History:  Sinus surgery    Family history:    There is no known family history of myopathy or other neuromuscular disorders.    Social History:    He denies tobacco, alcohol, or illicit drug use.     Medical Allergies:  NKDA     Current Medications:    Current Outpatient Prescriptions   Medication     CELLCEPT (BRAND) 500 MG TABLET     mycophenolate (GENERIC EQUIVALENT) 500 MG tablet     predniSONE (DELTASONE) 10 MG tablet     pyridostigmine (MESTINON) 60 MG tablet     predniSONE (DELTASONE) 10 MG tablet     predniSONE (DELTASONE) 5 MG tablet     No current facility-administered medications for this visit.      Review of Systems: A review of systems was obtained and was negative except for what was noted above.    Physical examination:    /81 (BP Location: Right arm, Patient Position: Chair, Cuff Size: Adult Regular)  Pulse 62  Resp 16  Wt 144 lb 12.8 oz (65.7 kg)  BMI 22.46 kg/m2    General Appearance: NAD    Skin: There are no rashes or other skin lesions.    Musculoskeletal:  There is no scoliosis, lordosis, kyphosis, pes cavus, or hammertoes.    Neurologic examination:    Mental status:  Patient is alert, attentive, and oriented x 3.  Language is coherent and fluent without dysarthria or aphasia.  Memory, comprehension and ability to follow commands were intact.       Cranial nerves: No ptosis at baseline. After about 10 seconds of upgaze he develops mild right eye ptosis and diplopia. Eye closure is strong. No diplopia even after sustained upgaze. Face and tongue strong.     Motor exam: No atrophy or fasciculations.   Manual muscle testing revealed the following MRC grade muscle power:   Right Left   Neck flexion 5    Neck extension: 5    Shoulder abduction:  5 5   Elbow extension: 5 5   Elbow flexion:  5 5   Wrist flexion:  5 5   Wrist extension:  5 5    FDI 5 5   Hip flexion 5 5   Knee extension 5 5   Dorsiflexion 5 5   Plantar flexion 5 5     Complex motor skills: No tremor or ataxia     Sensory exam: Normal vibration and pin in hands and feet    Gait narrow and stable.     Deep tendon reflexes:   Right Left   Triceps 2 2   Biceps 2 2   Brachioradialis 2 2   Knee jerk 2 2   Ankle jerk 2 2        Assessment:    David Vera is a 58 year old man with 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 He is doing well with the prednisone taper, but did have re-emergence of diplopia earlier this month. I can also detect a bit of fatigable ptosis and diplopia on examination today. For these reasons will hold at our current level of immunotherapy. If he is stable at next visit will taper prednisone further. If he is not stable then might increase mycophenolate to 2500 mg or 3000 mg daily. He agrees to the plan below.      Plan:      1. Mestinon: Probably not needed. Advised him to reduce to every day for a couple weeks and then stop if able.   2. Hold prednisone at 10 mg daily  3. Continue vitamin D 800 IU and calcium 1,000mg daily. Continue GI prophylaxis (pepcid 20 mg daily)  4. Continue mycophenolate 1000 mg BID. Monitoring labs last check 5/18. None needed today.   5. Discussed thymectomy. He is not interested at this time. If disease become more challenging to manage then will reconsider.   6. If he develops inability to swallow of shortness of breath he should seek urgent medical attention and then let me know. If he develops worsening ocular symptoms, dysarthria, or limb weakness he should let me know ASAP.   7. Follow up with me in 3 months. Sooner if needed.   ---  Again, thank you for allowing me to participate in the care of your patient.      Sincerely,    Go Jewell MD

## 2018-11-12 RX ORDER — PYRIDOSTIGMINE BROMIDE 60 MG/1
60 TABLET ORAL
Qty: 150 TABLET | Refills: 6 | Status: SHIPPED | OUTPATIENT
Start: 2018-11-12 | End: 2018-11-15

## 2018-11-30 DIAGNOSIS — G70.00 MYASTHENIA GRAVIS WITHOUT EXACERBATION (H): ICD-10-CM

## 2018-11-30 RX ORDER — MYCOPHENOLATE MOFETIL 500 MG/1
TABLET ORAL
Qty: 120 TABLET | Refills: 5 | Status: SHIPPED | OUTPATIENT
Start: 2018-11-30 | End: 2019-06-04

## 2019-01-29 ENCOUNTER — OFFICE VISIT (OUTPATIENT)
Dept: NEUROLOGY | Facility: CLINIC | Age: 59
End: 2019-01-29
Payer: COMMERCIAL

## 2019-01-29 VITALS
BODY MASS INDEX: 21.56 KG/M2 | DIASTOLIC BLOOD PRESSURE: 75 MMHG | SYSTOLIC BLOOD PRESSURE: 133 MMHG | TEMPERATURE: 96.8 F | HEART RATE: 75 BPM | WEIGHT: 139 LBS

## 2019-01-29 DIAGNOSIS — G70.00 MYASTHENIA GRAVIS WITHOUT EXACERBATION (H): Primary | ICD-10-CM

## 2019-01-29 ASSESSMENT — PAIN SCALES - GENERAL: PAINLEVEL: NO PAIN (0)

## 2019-01-29 NOTE — LETTER
2019     RE: David Vera  [address] [place] [address]     Dear Colleague,    Thank you for referring your patient, David Vera, to the [email or web address] NE[facility]OSPECIALTIES at [facility]. Please see a copy of my visit note below.    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 In 2017 he developed drooping of the left eyelid. In August he developed diplopia. In  he developed limb weakness. No shortness of breath. In early  he started mestinon, which helped ptosis. In mid  he started prednisone: 10 mg daily and then increasing to 20 mg daily after about a week. After prednisone was started he reported less eyelid ptosis and overall improved strength - but not normal. He increased to 40 mg in early . In  he also started mycophenolate. Prednisone was slowly tapered to 20 mg daily in 2018. By 18 he was at prednisone 10 mg daily.     Interval history:  I last saw him 10/23/18. In 2018 he developed diplopia again. It occured at night and in the morning after waking up. After he took mestinon it goes away. He increased Mestinon to five pills; 2 in the morning, 2 at midday, and 1 at dinner time - which has helped. We did not change his mmf or prednisone. He continues prednisone at 10 mg daily cellcept at 1000 mg BID. By December his vision improved to almost 100%. The double vision is minor and only rarely occurs. He has not developed ptosis, dysarthria, or dysphagia.  No SOB. No limb limb weakness. He exercises daily. He rides 20 miles on the bike twice weekly and runs 6 miles twice weekly.     Prior pertinent laboratory work-up:  : AChR binding ab 0.7 nmol/L  (N < 0.4). TSH normal.     Prior imagin/17: CT chest showed no evidence of thymoma. No mediastinal mass or lymphadenopathy.    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 Decrement was about 40% at the facial-nasalis.      Past Medical History:   Myasthenia gravis    Past Surgical History:  Sinus surgery    Family history:    There is no known family history of myopathy or other neuromuscular disorders.    Social History:    He denies tobacco, alcohol, or illicit drug use.     Medical Allergies:  NKDA     Current Medications:    Current Outpatient Medications   Medication     CELLCEPT (BRAND) 500 MG TABLET     mycophenolate (GENERIC EQUIVALENT) 500 MG tablet     predniSONE (DELTASONE) 10 MG tablet     pyridostigmine (MESTINON) 60 MG tablet     pyridostigmine (MESTINON) 60 MG tablet     predniSONE (DELTASONE) 10 MG tablet     predniSONE (DELTASONE) 5 MG tablet     No current facility-administered medications for this visit.      Review of Systems: A review of systems was obtained and was negative except for what was noted above.    Physical examination:    /75 (BP Location: Right arm, Patient Position: Chair, Cuff Size: Adult Regular)   Pulse 75   Temp 96.8  F (36  C)   Wt 63 kg (139 lb)   BMI 21.56 kg/m       General Appearance: NAD    Skin: There are no rashes or other skin lesions.    Musculoskeletal:  There is no scoliosis, lordosis, kyphosis, pes cavus, or hammertoes.    Neurologic examination:    Mental status:  Patient is alert, attentive, and oriented x 3.  Language is coherent and fluent without dysarthria or aphasia.  Memory, comprehension and ability to follow commands were intact.       Cranial nerves: No ptosis at baseline. After 60 seconds there was some very mild right eyelid ptosis - but no diplopia.  Face and tongue strong.     Motor exam: No atrophy or fasciculations.   Manual muscle testing revealed the following MRC grade muscle power:   Right Left   Neck flexion 5    Neck extension: 5    Shoulder abduction:  5 5   Elbow extension: 5 5   Elbow  flexion:  5 5   Wrist flexion:  5 5   Wrist extension:  5 5   FDI 5 5   Hip flexion 5 5   Knee extension 5 5   Dorsiflexion 5 5   Plantar flexion 5 5     Complex motor skills: No tremor or ataxia     Sensory exam: Normal vibration and pin in hands and feet    Gait narrow and stable.     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 He has generally done well with the prednisone taper, but has had some re-emergence of ocular symptoms when prednisone was tapered to 10 mg daily. Fortunately these have improved and presently he has only minimal manifestations. Even so, given recent diplopia I am concerned that further prednisone tapering will not be successful. For now I am going to hold him at his current prednisone and cellcept doses. If he is stable for another 2 months then will try to taper prednisone. If he is not stable then might increase mycophenolate to 2500 mg or 3000 mg daily before abort mycphenolate. He agrees to the plan below.      Plan:      1. Mestinon: Continue 2 in the morning, 2 at midday, and 1 at dinner time  2. Hold prednisone at 10 mg daily  3. Continue vitamin D 800 IU and calcium 1,000mg daily. Continue GI prophylaxis (pepcid 20 mg daily)  4. Hold mycophenolate at 1000 mg BID. Monitoring labs last check 5/18. Will repeat at next visit.   5. Discussed thymectomy. He is not interested at this time. If disease become more challenging to manage then will reconsider.   6. If he develops inability to swallow of shortness of breath he should seek urgent medical attention and then let me know. If he develops worsening ocular symptoms, dysarthria, or limb weakness he should let me know ASAP.   7. Follow up with me in 2 months. If stable at that visit will make a small drop in prednisone, probably 10/7.5. He  ocular symptoms return then will increase mycophenolate. If he has a more substantial relapse then will abort mycophenolate.   ----  Again, thank you for allowing me to participate in the care of your patient.      Sincerely,    Go Jewell MD

## 2019-01-29 NOTE — PROGRESS NOTES
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 In 2017 he developed drooping of the left eyelid. In August he developed diplopia. In  he developed limb weakness. No shortness of breath. In early  he started mestinon, which helped ptosis. In mid  he started prednisone: 10 mg daily and then increasing to 20 mg daily after about a week. After prednisone was started he reported less eyelid ptosis and overall improved strength - but not normal. He increased to 40 mg in early . In  he also started mycophenolate. Prednisone was slowly tapered to 20 mg daily in 2018. By 18 he was at prednisone 10 mg daily.     Interval history:  I last saw him 10/23/18. In 2018 he developed diplopia again. It occured at night and in the morning after waking up. After he took mestinon it goes away. He increased Mestinon to five pills; 2 in the morning, 2 at midday, and 1 at dinner time - which has helped. We did not change his mmf or prednisone. He continues prednisone at 10 mg daily cellcept at 1000 mg BID. By December his vision improved to almost 100%. The double vision is minor and only rarely occurs. He has not developed ptosis, dysarthria, or dysphagia.  No SOB. No limb limb weakness. He exercises daily. He rides 20 miles on the bike twice weekly and runs 6 miles twice weekly.     Prior pertinent laboratory work-up:  : AChR binding ab 0.7 nmol/L  (N < 0.4). TSH normal.     Prior imagin/17: CT chest showed no evidence of thymoma. No mediastinal mass or lymphadenopathy.    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 Decrement was about 40% at the facial-nasalis.      Past Medical History:   Myasthenia gravis    Past Surgical History:  Sinus surgery    Family history:    There is no known  family history of myopathy or other neuromuscular disorders.    Social History:    He denies tobacco, alcohol, or illicit drug use.     Medical Allergies:  NKDA     Current Medications:    Current Outpatient Medications   Medication     CELLCEPT (BRAND) 500 MG TABLET     mycophenolate (GENERIC EQUIVALENT) 500 MG tablet     predniSONE (DELTASONE) 10 MG tablet     pyridostigmine (MESTINON) 60 MG tablet     pyridostigmine (MESTINON) 60 MG tablet     predniSONE (DELTASONE) 10 MG tablet     predniSONE (DELTASONE) 5 MG tablet     No current facility-administered medications for this visit.      Review of Systems: A review of systems was obtained and was negative except for what was noted above.    Physical examination:    /75 (BP Location: Right arm, Patient Position: Chair, Cuff Size: Adult Regular)   Pulse 75   Temp 96.8  F (36  C)   Wt 63 kg (139 lb)   BMI 21.56 kg/m      General Appearance: NAD    Skin: There are no rashes or other skin lesions.    Musculoskeletal:  There is no scoliosis, lordosis, kyphosis, pes cavus, or hammertoes.    Neurologic examination:    Mental status:  Patient is alert, attentive, and oriented x 3.  Language is coherent and fluent without dysarthria or aphasia.  Memory, comprehension and ability to follow commands were intact.       Cranial nerves: No ptosis at baseline. After 60 seconds there was some very mild right eyelid ptosis - but no diplopia.  Face and tongue strong.     Motor exam: No atrophy or fasciculations.   Manual muscle testing revealed the following MRC grade muscle power:   Right Left   Neck flexion 5    Neck extension: 5    Shoulder abduction:  5 5   Elbow extension: 5 5   Elbow flexion:  5 5   Wrist flexion:  5 5   Wrist extension:  5 5   FDI 5 5   Hip flexion 5 5   Knee extension 5 5   Dorsiflexion 5 5   Plantar flexion 5 5     Complex motor skills: No tremor or ataxia     Sensory exam: Normal vibration and pin in hands and feet    Gait narrow and stable.      Deep tendon reflexes:   Right Left   Triceps 2 2   Biceps 2 2   Brachioradialis 2 2   Knee jerk 2 2   Ankle jerk 2 2        Assessment:    David Vera is a 58 year old man with 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 He has generally done well with the prednisone taper, but has had some re-emergence of ocular symptoms when prednisone was tapered to 10 mg daily. Fortunately these have improved and presently he has only minimal manifestations. Even so, given recent diplopia I am concerned that further prednisone tapering will not be successful. For now I am going to hold him at his current prednisone and cellcept doses. If he is stable for another 2 months then will try to taper prednisone. If he is not stable then might increase mycophenolate to 2500 mg or 3000 mg daily before abort mycphenolate. He agrees to the plan below.      Plan:      1. Mestinon: Continue 2 in the morning, 2 at midday, and 1 at dinner time  2. Hold prednisone at 10 mg daily  3. Continue vitamin D 800 IU and calcium 1,000mg daily. Continue GI prophylaxis (pepcid 20 mg daily)  4. Hold mycophenolate at 1000 mg BID. Monitoring labs last check 5/18. Will repeat at next visit.   5. Discussed thymectomy. He is not interested at this time. If disease become more challenging to manage then will reconsider.   6. If he develops inability to swallow of shortness of breath he should seek urgent medical attention and then let me know. If he develops worsening ocular symptoms, dysarthria, or limb weakness he should let me know ASAP.   7. Follow up with me in 2 months. If stable at that visit will make a small drop in prednisone, probably 10/7.5. He ocular symptoms return then will increase mycophenolate. If he has a more substantial relapse then will abort mycophenolate.   ----

## 2019-02-07 DIAGNOSIS — G70.01 MYASTHENIA GRAVIS WITH EXACERBATION (H): ICD-10-CM

## 2019-02-07 RX ORDER — PREDNISONE 10 MG/1
40 TABLET ORAL DAILY
Qty: 120 TABLET | Refills: 1 | Status: SHIPPED | OUTPATIENT
Start: 2019-02-07 | End: 2019-12-01

## 2019-02-07 NOTE — TELEPHONE ENCOUNTER
Pt states that he is taking 1 10mg tab daily. Could you refill this? He states that the pharmacy is only giving him 15 day supplies and is requesting 90 days supplies.

## 2019-03-26 ENCOUNTER — OFFICE VISIT (OUTPATIENT)
Dept: NEUROLOGY | Facility: CLINIC | Age: 59
End: 2019-03-26
Payer: COMMERCIAL

## 2019-03-26 VITALS
TEMPERATURE: 98.1 F | WEIGHT: 135 LBS | SYSTOLIC BLOOD PRESSURE: 119 MMHG | DIASTOLIC BLOOD PRESSURE: 75 MMHG | HEART RATE: 78 BPM | BODY MASS INDEX: 20.94 KG/M2

## 2019-03-26 DIAGNOSIS — G70.00 MYASTHENIA GRAVIS WITHOUT EXACERBATION (H): Primary | ICD-10-CM

## 2019-03-26 RX ORDER — PREDNISONE 5 MG/1
5 TABLET ORAL 2 TIMES DAILY
Qty: 60 TABLET | Refills: 3 | Status: SHIPPED | OUTPATIENT
Start: 2019-03-26 | End: 2020-03-10

## 2019-03-26 ASSESSMENT — PAIN SCALES - GENERAL: PAINLEVEL: NO PAIN (0)

## 2019-03-26 NOTE — PROGRESS NOTES
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 In 2017 he developed drooping of the left eyelid. In August he developed diplopia. In  he developed limb weakness. No shortness of breath. In early  he started mestinon, which helped ptosis. In mid  he started prednisone: 10 mg daily and then increasing to 20 mg daily after about a week. After prednisone was started he reported less eyelid ptosis and overall improved strength - but not normal. He increased to 40 mg in early . In  he also started mycophenolate. Prednisone was slowly tapered to 20 mg daily in 2018. By 18 he was at prednisone 10 mg daily. In  he developed some recurrence of diplopia and so we help at 10 mg for a couple months. Diplopia resolved.     Interval history:  I last saw him 19. His prednisone dose is 10 mg daily. He continues mycophenolate 1000 mg BID. He is doing well. He rarely experiences diplopia when he looks up for a while. No ptosis, dysarthria, or dysphagia.  No SOB. No limb limb weakness. He exercises daily. Recently took a ski trip to [place]. He rides 20 miles on the bike twice weekly and runs 6 miles twice weekly.     Prior pertinent laboratory work-up:  : AChR binding ab 0.7 nmol/L  (N < 0.4). TSH normal.     Prior imagin/17: CT chest showed no evidence of thymoma. No mediastinal mass or lymphadenopathy.    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 Decrement was about 40% at the facial-nasalis.      Past Medical History:   Myasthenia gravis    Past Surgical History:  Sinus surgery    Family history:    There is no known family history of myopathy or other neuromuscular disorders.    Social History:    He denies tobacco, alcohol, or illicit drug use.     Medical Allergies:  NKDA      Current Medications:    Current Outpatient Medications   Medication     CELLCEPT (BRAND) 500 MG TABLET     mycophenolate (GENERIC EQUIVALENT) 500 MG tablet     predniSONE (DELTASONE) 10 MG tablet     pyridostigmine (MESTINON) 60 MG tablet     pyridostigmine (MESTINON) 60 MG tablet     predniSONE (DELTASONE) 10 MG tablet     predniSONE (DELTASONE) 5 MG tablet     No current facility-administered medications for this visit.      Review of Systems: A review of systems was obtained and was negative except for what was noted above.    Physical examination:    /75 (BP Location: Left arm, Patient Position: Chair, Cuff Size: Adult Regular)   Pulse 78   Temp 98.1  F (36.7  C)   Wt 135 lb (61.2 kg)   BMI 20.94 kg/m      General Appearance: NAD    Skin: There are no rashes or other skin lesions.    Musculoskeletal:  There is no scoliosis, lordosis, kyphosis, pes cavus, or hammertoes.    Neurologic examination:    Mental status:  Patient is alert, attentive, and oriented x 3.  Language is coherent and fluent without dysarthria or aphasia.  Memory, comprehension and ability to follow commands were intact.       Cranial nerves: No ptosis at baseline. I could not appreciate any eyelid weakness or ptosis even after 60 seconds of upgaze. No diplopia. Face and tongue strong.     Motor exam: No atrophy or fasciculations.   Manual muscle testing revealed the following MRC grade muscle power:   Right Left   Neck flexion 5    Neck extension: 5    Shoulder abduction:  5 5   Elbow extension: 5 5   Elbow flexion:  5 5   Wrist flexion:  5 5   Wrist extension:  5 5   FDI 5 5   Hip flexion 5 5   Knee extension 5 5   Dorsiflexion 5 5   Plantar flexion 5 5     Complex motor skills: No tremor or ataxia     Sensory exam: Normal vibration and pin in hands and feet    Gait narrow and stable.     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 He continues to do well, now with only minimal manifestations. At this point will again try to slowly taper prednisone as below.  If unable to taper to an acceptable level then will increase to lowest effective dose, and may increase mycophenolate to 2500 mg or 3000 mg daily before abort mycphenolate. He agrees to the plan below.      Plan:      1. Mestinon: Continue 2 in the morning, 2 at midday, and 1 at dinner time  2. Decrease prednisone from 10 mg daily to 10/7.5 mg on alternating days x 2 weeks then 7.5 mg daily. Will stay at 7.5 mg until wee meet next.   3. Continue vitamin D 800 IU and calcium 1,000mg daily. Continue GI prophylaxis (pepcid 20 mg daily)  4. Continue mycophenolate at 1000 mg BID. Monitoring labs last check 5/18. Will repeat today.   5. Discussed thymectomy. He is not interested at this time. If disease become more challenging to manage then will reconsider.   6. If he develops inability to swallow of shortness of breath he should seek urgent medical attention and then let me know. If he develops worsening ocular symptoms, dysarthria, or limb weakness he should let me know ASAP.   7. Follow up with me in 2 months. If stable at that visit will make a small drop in prednisone, probably 10/7.5. He ocular symptoms return then will increase mycophenolate. If he has a more substantial relapse then will abort mycophenolate.   ----  ADDENDUM:  5/16/19: Received a note from David. He reports that he has been at pred 7.5 mg daily for 3 weeks. On 5/12/19 he started getting double vision in the morning. Now it is lasting most of the day.No dysarthria, dysphagia, limb weakness, or shortness of breath. I advised him to go back up to 10 mg daily. Will see if this improves over time. Perhaps just a slower taper is needed. If not then may need to increase mycophenolate or find a different steroid sparing  medication.

## 2019-03-26 NOTE — LETTER
3/26/2019       RE: David Vera  [address] [place] [address]     Dear Colleague,    Thank you for referring your patient, David Vera, to the [facility] NEUROSPECIALTIES at [facility]. Please see a copy of my visit note below.    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 In 2017 he developed drooping of the left eyelid. In August he developed diplopia. In  he developed limb weakness. No shortness of breath. In early  he started mestinon, which helped ptosis. In mid  he started prednisone: 10 mg daily and then increasing to 20 mg daily after about a week. After prednisone was started he reported less eyelid ptosis and overall improved strength - but not normal. He increased to 40 mg in early . In  he also started mycophenolate. Prednisone was slowly tapered to 20 mg daily in 2018. By 18 he was at prednisone 10 mg daily. In  he developed some recurrence of diplopia and so we help at 10 mg for a couple months. Diplopia resolved.     Interval history:  I last saw him 19. His prednisone dose is 10 mg daily. He continues mycophenolate 1000 mg BID. He is doing well. He rarely experiences diplopia when he looks up for a while. No ptosis, dysarthria, or dysphagia.  No SOB. No limb limb weakness. He exercises daily. Recently took a ski trip to [place]. He rides 20 miles on the bike twice weekly and runs 6 miles twice weekly.     Prior pertinent laboratory work-up:  : AChR binding ab 0.7 nmol/L  (N < 0.4). TSH normal.     Prior imagin/17: CT chest showed no evidence of thymoma. No mediastinal mass or lymphadenopathy.    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 Decrement was about 40% at the facial-nasalis.       Past Medical History:   Myasthenia gravis    Past Surgical History:  Sinus surgery    Family history:    There is no known family history of myopathy or other neuromuscular disorders.    Social History:    He denies tobacco, alcohol, or illicit drug use.     Medical Allergies:  NKDA     Current Medications:    Current Outpatient Medications   Medication     CELLCEPT (BRAND) 500 MG TABLET     mycophenolate (GENERIC EQUIVALENT) 500 MG tablet     predniSONE (DELTASONE) 10 MG tablet     pyridostigmine (MESTINON) 60 MG tablet     pyridostigmine (MESTINON) 60 MG tablet     predniSONE (DELTASONE) 10 MG tablet     predniSONE (DELTASONE) 5 MG tablet     No current facility-administered medications for this visit.      Review of Systems: A review of systems was obtained and was negative except for what was noted above.    Physical examination:    /75 (BP Location: Left arm, Patient Position: Chair, Cuff Size: Adult Regular)   Pulse 78   Temp 98.1  F (36.7  C)   Wt 135 lb (61.2 kg)   BMI 20.94 kg/m       General Appearance: NAD    Skin: There are no rashes or other skin lesions.    Musculoskeletal:  There is no scoliosis, lordosis, kyphosis, pes cavus, or hammertoes.    Neurologic examination:    Mental status:  Patient is alert, attentive, and oriented x 3.  Language is coherent and fluent without dysarthria or aphasia.  Memory, comprehension and ability to follow commands were intact.       Cranial nerves: No ptosis at baseline. I could not appreciate any eyelid weakness or ptosis even after 60 seconds of upgaze. No diplopia. Face and tongue strong.     Motor exam: No atrophy or fasciculations.   Manual muscle testing revealed the following MRC grade muscle power:   Right Left   Neck flexion 5    Neck extension: 5    Shoulder abduction:  5 5   Elbow extension: 5 5   Elbow flexion:  5 5   Wrist flexion:  5 5   Wrist extension:  5 5   FDI 5 5   Hip flexion 5 5   Knee extension 5 5   Dorsiflexion 5 5   Plantar  flexion 5 5     Complex motor skills: No tremor or ataxia     Sensory exam: Normal vibration and pin in hands and feet    Gait narrow and stable.     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 He continues to do well, now with only minimal manifestations. At this point will again try to slowly taper prednisone as below.  If unable to taper to an acceptable level then will increase to lowest effective dose, and may increase mycophenolate to 2500 mg or 3000 mg daily before abort mycphenolate. He agrees to the plan below.      Plan:      1. Mestinon: Continue 2 in the morning, 2 at midday, and 1 at dinner time  2. Decrease prednisone from 10 mg daily to 10/7.5 mg on alternating days x 2 weeks then 7.5 mg daily. Will stay at 7.5 mg until wee meet next.   3. Continue vitamin D 800 IU and calcium 1,000mg daily. Continue GI prophylaxis (pepcid 20 mg daily)  4. Continue mycophenolate at 1000 mg BID. Monitoring labs last check 5/18. Will repeat today.   5. Discussed thymectomy. He is not interested at this time. If disease become more challenging to manage then will reconsider.   6. If he develops inability to swallow of shortness of breath he should seek urgent medical attention and then let me know. If he develops worsening ocular symptoms, dysarthria, or limb weakness he should let me know ASAP.   7. Follow up with me in 2 months. If stable at that visit will make a small drop in prednisone, probably 10/7.5. He ocular symptoms return then will increase mycophenolate. If he has a more substantial relapse then will abort mycophenolate.     Again, thank you for allowing me to participate in the care of your patient.      Sincerely,    Go Jewell MD

## 2019-06-04 ENCOUNTER — OFFICE VISIT (OUTPATIENT)
Dept: NEUROLOGY | Facility: CLINIC | Age: 59
End: 2019-06-04
Payer: COMMERCIAL

## 2019-06-04 VITALS
DIASTOLIC BLOOD PRESSURE: 72 MMHG | BODY MASS INDEX: 22.15 KG/M2 | WEIGHT: 142.8 LBS | HEART RATE: 75 BPM | SYSTOLIC BLOOD PRESSURE: 125 MMHG

## 2019-06-04 DIAGNOSIS — G70.00 MYASTHENIA GRAVIS WITHOUT EXACERBATION (H): ICD-10-CM

## 2019-06-04 DIAGNOSIS — G70.01 MYASTHENIA GRAVIS WITH EXACERBATION (H): Primary | ICD-10-CM

## 2019-06-04 RX ORDER — MYCOPHENOLATE MOFETIL 500 MG/1
TABLET ORAL
Qty: 120 TABLET | Refills: 5 | Status: SHIPPED | OUTPATIENT
Start: 2019-06-04 | End: 2019-08-26

## 2019-06-04 ASSESSMENT — PAIN SCALES - GENERAL: PAINLEVEL: NO PAIN (0)

## 2019-06-04 NOTE — LETTER
2019     RE: David Vera  [address] [place] [address]     Dear Colleague,    Thank you for referring your patient, David Vera, to the [facility] NEUROSPECIALTIES at [facility]. Please see a copy of my visit note below.    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 In 2017 he developed drooping of the left eyelid. In August he developed diplopia. In  he developed limb weakness. No shortness of breath. In early  he started mestinon, which helped ptosis. In mid  he started prednisone: 10 mg daily and then increasing to 20 mg daily after about a week. After prednisone was started he reported less eyelid ptosis and overall improved strength - but not normal. He increased to 40 mg in early . In  he also started mycophenolate. Prednisone was slowly tapered to 20 mg daily in 2018. By 18 he was at prednisone 10 mg daily. In  he developed some recurrence of diplopia and so we help at 10 mg for a couple months. Diplopia resolved.     Interval history:  I last saw him 3/26/19. After that visit he reduced prednisone from 10 mg daily to 10/7.5 x 2 weeks and then 7.5 mg daily. In mid May after being at 7.5 mg daily for 3 weeks he started getting double vision in the morning and then throughout the day. No dysarthria, dysphagia, limb weakness, or shortness of breath. I advised him to go back up to 10 mg daily, which is where he is at now. After he went back to 10 he started to improve. He now just has some diplopia in the morning and at night - but much better than a couple weeks ago. He continues mycophenolate 1000 mg BID. He continues mestinon 120 am, 120 mid day, and 60 evening.     Prior pertinent laboratory work-up:  : AChR binding ab 0.7 nmol/L  (N < 0.4). TSH normal.     Prior imagin/17: CT chest showed no evidence of thymoma. No  mediastinal mass or lymphadenopathy.    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 Decrement was about 40% at the facial-nasalis.      Past Medical History:   Myasthenia gravis    Past Surgical History:  Sinus surgery    Family history:    There is no known family history of myopathy or other neuromuscular disorders.    Social History:    He denies tobacco, alcohol, or illicit drug use.     Medical Allergies:  NKDA     Current Medications:    Current Outpatient Medications   Medication     CELLCEPT (BRAND) 500 MG TABLET     mycophenolate (GENERIC EQUIVALENT) 500 MG tablet     predniSONE (DELTASONE) 10 MG tablet     predniSONE (DELTASONE) 5 MG tablet     pyridostigmine (MESTINON) 60 MG tablet     pyridostigmine (MESTINON) 60 MG tablet     predniSONE (DELTASONE) 10 MG tablet     predniSONE (DELTASONE) 5 MG tablet     No current facility-administered medications for this visit.      Review of Systems: A review of systems was obtained and was negative except for what was noted above.    Physical examination:    /72   Pulse 75   Wt 64.8 kg (142 lb 12.8 oz)   BMI 22.15 kg/m       General Appearance: NAD    Skin: There are no rashes or other skin lesions.    Musculoskeletal:  There is no scoliosis, lordosis, kyphosis, pes cavus, or hammertoes.    Neurologic examination:    Mental status:  Patient is alert, attentive, and oriented x 3.  Language is coherent and fluent without dysarthria or aphasia.  Memory, comprehension and ability to follow commands were intact.       Cranial nerves: No ptosis at baseline. There is a mild degree of eye closure weakness. No ptosis even after prolonged upgaze. He has diplopia almost immediately with lateral gaze.Face and tongue strong.     Motor exam: No atrophy or fasciculations.   Manual muscle testing revealed the following MRC grade muscle power:   Right Left    Neck flexion 5    Neck extension: 5    Shoulder abduction:  5 5   Elbow extension: 5 5   Elbow flexion:  5 5   Wrist flexion:  5 5   Wrist extension:  5 5   FDI 5 5   Hip flexion 5 5   Knee extension 5 5   Dorsiflexion 5 5   Plantar flexion 5 5     Complex motor skills: No tremor or ataxia     Sensory exam: Normal vibration and pin in hands and feet    Gait narrow and stable.     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 Unfortunately he had a minor set back with prednisone tapering from 10 to 7.5 mg daily. We agreed to hold at pred 10 mg now. I expect that over the next few weeks he will improve to minimal manifestations. We also agreed to increase mycophenolate to 2500 mg. After a few months at the higher mycophenolate dose will repeat prednisone tapering at a slower rate.     Plan:      1. Mestinon: Continue 2 in the morning, 2 at midday, and 1 at dinner time  2. Hold prednisone at 10 mg daily  3. Continue vitamin D 800 IU and calcium 1,000mg daily. Continue GI prophylaxis (pepcid 20 mg daily)  4. Increase mycophenolate from 1000 mg BID to 1500/1000 mg (total dose 2500 mg daily).   5. Monitoring labs lasted check 5/18. Repeat at next visit.   6. Discussed thymectomy. He is not interested at this time. If disease become more challenging to manage then will reconsider.   7. If he develops inability to swallow of shortness of breath he should seek urgent medical attention and then let me know. If he develops worsening ocular symptoms, dysarthria, or limb weakness he should let me know ASAP.   8. Follow up with me in 2-3 months. If improved and stable at next visit may again try very slow prednisone weaning.   ----  Again, thank you for allowing me to participate in the care of your patient.       Sincerely,    Go Jewell MD

## 2019-06-04 NOTE — PROGRESS NOTES
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 In 2017 he developed drooping of the left eyelid. In August he developed diplopia. In  he developed limb weakness. No shortness of breath. In early  he started mestinon, which helped ptosis. In mid  he started prednisone: 10 mg daily and then increasing to 20 mg daily after about a week. After prednisone was started he reported less eyelid ptosis and overall improved strength - but not normal. He increased to 40 mg in early . In  he also started mycophenolate. Prednisone was slowly tapered to 20 mg daily in 2018. By 18 he was at prednisone 10 mg daily. In  he developed some recurrence of diplopia and so we help at 10 mg for a couple months. Diplopia resolved.     Interval history:  I last saw him 3/26/19. After that visit he reduced prednisone from 10 mg daily to 10/7.5 x 2 weeks and then 7.5 mg daily. In mid May after being at 7.5 mg daily for 3 weeks he started getting double vision in the morning and then throughout the day. No dysarthria, dysphagia, limb weakness, or shortness of breath. I advised him to go back up to 10 mg daily, which is where he is at now. After he went back to 10 he started to improve. He now just has some diplopia in the morning and at night - but much better than a couple weeks ago. He continues mycophenolate 1000 mg BID. He continues mestinon 120 am, 120 mid day, and 60 evening.     Prior pertinent laboratory work-up:  : AChR binding ab 0.7 nmol/L  (N < 0.4). TSH normal.     Prior imagin/17: CT chest showed no evidence of thymoma. No mediastinal mass or lymphadenopathy.    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 Decrement was about 40% at the facial-nasalis.      Past Medical History:   Myasthenia  gravis    Past Surgical History:  Sinus surgery    Family history:    There is no known family history of myopathy or other neuromuscular disorders.    Social History:    He denies tobacco, alcohol, or illicit drug use.     Medical Allergies:  NKDA     Current Medications:    Current Outpatient Medications   Medication     CELLCEPT (BRAND) 500 MG TABLET     mycophenolate (GENERIC EQUIVALENT) 500 MG tablet     predniSONE (DELTASONE) 10 MG tablet     predniSONE (DELTASONE) 5 MG tablet     pyridostigmine (MESTINON) 60 MG tablet     pyridostigmine (MESTINON) 60 MG tablet     predniSONE (DELTASONE) 10 MG tablet     predniSONE (DELTASONE) 5 MG tablet     No current facility-administered medications for this visit.      Review of Systems: A review of systems was obtained and was negative except for what was noted above.    Physical examination:    /72   Pulse 75   Wt 64.8 kg (142 lb 12.8 oz)   BMI 22.15 kg/m      General Appearance: NAD    Skin: There are no rashes or other skin lesions.    Musculoskeletal:  There is no scoliosis, lordosis, kyphosis, pes cavus, or hammertoes.    Neurologic examination:    Mental status:  Patient is alert, attentive, and oriented x 3.  Language is coherent and fluent without dysarthria or aphasia.  Memory, comprehension and ability to follow commands were intact.       Cranial nerves: No ptosis at baseline. There is a mild degree of eye closure weakness. No ptosis even after prolonged upgaze. He has diplopia almost immediately with lateral gaze.Face and tongue strong.     Motor exam: No atrophy or fasciculations.   Manual muscle testing revealed the following MRC grade muscle power:   Right Left   Neck flexion 5    Neck extension: 5    Shoulder abduction:  5 5   Elbow extension: 5 5   Elbow flexion:  5 5   Wrist flexion:  5 5   Wrist extension:  5 5   FDI 5 5   Hip flexion 5 5   Knee extension 5 5   Dorsiflexion 5 5   Plantar flexion 5 5     Complex motor skills: No tremor or  ataxia     Sensory exam: Normal vibration and pin in hands and feet    Gait narrow and stable.     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 Unfortunately he had a minor set back with prednisone tapering from 10 to 7.5 mg daily. We agreed to hold at pred 10 mg now. I expect that over the next few weeks he will improve to minimal manifestations. We also agreed to increase mycophenolate to 2500 mg. After a few months at the higher mycophenolate dose will repeat prednisone tapering at a slower rate.     Plan:      1. Mestinon: Continue 2 in the morning, 2 at midday, and 1 at dinner time  2. Hold prednisone at 10 mg daily  3. Continue vitamin D 800 IU and calcium 1,000mg daily. Continue GI prophylaxis (pepcid 20 mg daily)  4. Increase mycophenolate from 1000 mg BID to 1500/1000 mg (total dose 2500 mg daily).   5. Monitoring labs lasted check 5/18. Repeat at next visit.   6. Discussed thymectomy. He is not interested at this time. If disease become more challenging to manage then will reconsider.   7. If he develops inability to swallow of shortness of breath he should seek urgent medical attention and then let me know. If he develops worsening ocular symptoms, dysarthria, or limb weakness he should let me know ASAP.   8. Follow up with me in 2-3 months. If improved and stable at next visit may again try very slow prednisone weaning.   ----

## 2019-06-06 DIAGNOSIS — G70.00 MYASTHENIA GRAVIS WITHOUT EXACERBATION (H): ICD-10-CM

## 2019-06-07 RX ORDER — PYRIDOSTIGMINE BROMIDE 60 MG/1
TABLET ORAL
Qty: 150 TABLET | Refills: 5 | OUTPATIENT
Start: 2019-06-07

## 2019-07-10 NOTE — PATIENT INSTRUCTIONS
Do not take Mestinon 24 hours prior to EMG.     Only take 1 tablet of Prednisone until you see Dr Salinas on Monday. Disregard the instructions stated on the bottle.     If your symptoms change or worsen, go to the nearest ED to be evaluated.   
no

## 2019-08-26 DIAGNOSIS — G70.00 MYASTHENIA GRAVIS WITHOUT EXACERBATION (H): ICD-10-CM

## 2019-08-26 RX ORDER — MYCOPHENOLATE MOFETIL 500 MG/1
TABLET ORAL
Qty: 120 TABLET | Refills: 5 | Status: SHIPPED | OUTPATIENT
Start: 2019-08-26 | End: 2020-01-09

## 2019-09-10 ENCOUNTER — OFFICE VISIT (OUTPATIENT)
Dept: NEUROLOGY | Facility: CLINIC | Age: 59
End: 2019-09-10
Payer: COMMERCIAL

## 2019-09-10 VITALS
RESPIRATION RATE: 19 BRPM | WEIGHT: 141.4 LBS | BODY MASS INDEX: 21.93 KG/M2 | SYSTOLIC BLOOD PRESSURE: 125 MMHG | HEART RATE: 81 BPM | DIASTOLIC BLOOD PRESSURE: 73 MMHG

## 2019-09-10 DIAGNOSIS — G70.00 MYASTHENIA GRAVIS WITHOUT EXACERBATION (H): Primary | ICD-10-CM

## 2019-09-10 ASSESSMENT — PAIN SCALES - GENERAL: PAINLEVEL: NO PAIN (0)

## 2019-09-10 NOTE — LETTER
9/10/2019       RE: David Vera  [address] [place] [address]     Dear Colleague,    Thank you for referring your patient, David Vera, to the [facility] NEUROSPECIALTIES at [facility]. Please see a copy of my visit note below.    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 In August 2017 he developed drooping of the left eyelid. In August he developed diplopia. In 11/17 he developed limb weakness. No shortness of breath. In early 11/17 he started mestinon, which helped ptosis. In mid 11/17 he started prednisone: 10 mg daily and then increasing to 20 mg daily after about a week. After prednisone was started he reported less eyelid ptosis and overall improved strength - but not normal. He increased to 40 mg in early 2018. In 12/17 he also started mycophenolate. Prednisone was slowly tapered to 20 mg daily in 7/2018. By 9/1/18 he was at prednisone 10 mg daily. In 11/18 he developed some recurrence of diplopia and so we help at 10 mg for a couple months. Diplopia resolved. In the spring 2019 he reduced prednisone to 7.5 mg daily and developed diplopia. In 6/19 predniosne was increased back to 10 mg daily and mycophenolate to 2500 mg daily.     Interval history:  I last saw him 6/4/19. He is doing well since his last visit. No MG relapses. He is still taking prednisone from 10 mg daily and since June increased mycophenolate to 2500 mg daily. Around July his prior symptoms improved. He no longer has any diplopia, ptosis, dysarthria, dysphagia, shortness of breath or limb weakness. He remains very active with biking, running and lifting weights. He has had no limitations of these activities over the last several months. He continues mestinon 120 am, 120 mid day, and 60 evening. He is not sure if he mestinon is helping any symptom.     Prior pertinent laboratory work-up:  11/17: AChR binding  ab 0.7 nmol/L  (N < 0.4). TSH normal.     Prior imagin/17: CT chest showed no evidence of thymoma. No mediastinal mass or lymphadenopathy.    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 Decrement was about 40% at the facial-nasalis.      Past Medical History:   Myasthenia gravis    Past Surgical History:  Sinus surgery    Family history:    There is no known family history of myopathy or other neuromuscular disorders.    Social History:    He denies tobacco, alcohol, or illicit drug use.     Medical Allergies:  NKDA     Current Medications:    Current Outpatient Medications   Medication     CELLCEPT (BRAND) 500 MG TABLET     mycophenolate (GENERIC EQUIVALENT) 500 MG tablet     predniSONE (DELTASONE) 10 MG tablet     pyridostigmine (MESTINON) 60 MG tablet     pyridostigmine (MESTINON) 60 MG tablet     predniSONE (DELTASONE) 10 MG tablet     predniSONE (DELTASONE) 5 MG tablet     predniSONE (DELTASONE) 5 MG tablet     No current facility-administered medications for this visit.      Review of Systems: A review of systems was obtained and was negative except for what was noted above.    Physical examination:    /73 (BP Location: Left arm, Patient Position: Sitting, Cuff Size: Adult Regular)   Pulse 81   Resp 19   Wt 64.1 kg (141 lb 6.4 oz)   BMI 21.93 kg/m       General Appearance: NAD    Skin: There are no rashes or other skin lesions.    Musculoskeletal:  There is no scoliosis, lordosis, kyphosis, pes cavus, or hammertoes.    Neurologic examination:    Mental status:  Patient is alert, attentive, and oriented x 3.  Language is coherent and fluent without dysarthria or aphasia.  Memory, comprehension and ability to follow commands were intact.       Cranial nerves: No ptosis at baseline or after sustained upgaze. Eye closure strength is normal today. No diplopia even after prolonged  upgaze. Face and tongue strong.     Motor exam: No atrophy or fasciculations.   Manual muscle testing revealed the following MRC grade muscle power:   Right Left   Neck flexion 5    Neck extension: 5    Shoulder abduction:  5 5   Elbow extension: 5 5   Elbow flexion:  5 5   Wrist flexion:  5 5   Wrist extension:  5 5   FDI 5 5   Hip flexion 5 5   Knee extension 5 5   Dorsiflexion 5 5   Plantar flexion 5 5     Complex motor skills: No tremor or ataxia     Sensory exam: Normal vibration and pin in hands and feet    Gait narrow and stable.     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 Since his last set-back in the spring he is back to essentially no MG manifestations. As it has now only been about 3 months since we increased the mycohenolate dose we will not make any prednisone changes today. When I see him back next will resume slow prednisone taper as long as he continues to do well. We will try to reduce mestinon to the lowest effective dose. If he is able to reduce without symptom worsening this also will give us more confidence that his MG is under optimal control.     Plan:      1. Mestinon: Reduce from 120 in the morning, 120 at midday, and 60 at dinner time by 60 mg every few days to titrate to lowest effective dose.   2. Hold prednisone at 10 mg daily for now. If stable at next visit anticipate initiating very slow taper, probable 10/7.5 alternating for 4 weeks, then 7.5 daily for 4 weeks, and so on.   3. Continue vitamin D 800 IU and calcium 1,000mg daily. Continue GI prophylaxis (pepcid 20 mg daily)  4. Continue mycophenolate from 1500/1000 mg (total dose 2500 mg daily).   5. Monitoring labs: Lasted check 5/18. Will repeat CBC today.   6. Discussed thymectomy. He is not interested at this time. If disease  become more challenging to manage then will reconsider.   7. If he develops inability to swallow of shortness of breath he should seek urgent medical attention and then let me know. If he develops worsening ocular symptoms, dysarthria, or limb weakness he should let me know ASAP.   8. Follow up with me in 3 months. Sooner if needed.   ----      Again, thank you for allowing me to participate in the care of your patient.      Sincerely,    Go Jewell MD

## 2019-09-10 NOTE — PROGRESS NOTES
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 In 2017 he developed drooping of the left eyelid. In August he developed diplopia. In  he developed limb weakness. No shortness of breath. In early  he started mestinon, which helped ptosis. In mid  he started prednisone: 10 mg daily and then increasing to 20 mg daily after about a week. After prednisone was started he reported less eyelid ptosis and overall improved strength - but not normal. He increased to 40 mg in early . In  he also started mycophenolate. Prednisone was slowly tapered to 20 mg daily in 2018. By 18 he was at prednisone 10 mg daily. In  he developed some recurrence of diplopia and so we help at 10 mg for a couple months. Diplopia resolved. In the spring 2019 he reduced prednisone to 7.5 mg daily and developed diplopia. In  predniosne was increased back to 10 mg daily and mycophenolate to 2500 mg daily.     Interval history:  I last saw him 19. He is doing well since his last visit. No MG relapses. He is still taking prednisone from 10 mg daily and since  increased mycophenolate to 2500 mg daily. Around July his prior symptoms improved. He no longer has any diplopia, ptosis, dysarthria, dysphagia, shortness of breath or limb weakness. He remains very active with biking, running and lifting weights. He has had no limitations of these activities over the last several months. He continues mestinon 120 am, 120 mid day, and 60 evening. He is not sure if he mestinon is helping any symptom.     Prior pertinent laboratory work-up:  : AChR binding ab 0.7 nmol/L  (N < 0.4). TSH normal.     Prior imagin/17: CT chest showed no evidence of thymoma. No mediastinal mass or lymphadenopathy.    Prior electrophysiologic work-up:  : Nerve conduction studies/needle EMG showed evidence of a disorder of post-synaptic neuromuscular  junction transmission. Decrement was about 40% at the facial-nasalis.      Past Medical History:   Myasthenia gravis    Past Surgical History:  Sinus surgery    Family history:    There is no known family history of myopathy or other neuromuscular disorders.    Social History:    He denies tobacco, alcohol, or illicit drug use.     Medical Allergies:  NKDA     Current Medications:    Current Outpatient Medications   Medication     CELLCEPT (BRAND) 500 MG TABLET     mycophenolate (GENERIC EQUIVALENT) 500 MG tablet     predniSONE (DELTASONE) 10 MG tablet     pyridostigmine (MESTINON) 60 MG tablet     pyridostigmine (MESTINON) 60 MG tablet     predniSONE (DELTASONE) 10 MG tablet     predniSONE (DELTASONE) 5 MG tablet     predniSONE (DELTASONE) 5 MG tablet     No current facility-administered medications for this visit.      Review of Systems: A review of systems was obtained and was negative except for what was noted above.    Physical examination:    /73 (BP Location: Left arm, Patient Position: Sitting, Cuff Size: Adult Regular)   Pulse 81   Resp 19   Wt 64.1 kg (141 lb 6.4 oz)   BMI 21.93 kg/m      General Appearance: NAD    Skin: There are no rashes or other skin lesions.    Musculoskeletal:  There is no scoliosis, lordosis, kyphosis, pes cavus, or hammertoes.    Neurologic examination:    Mental status:  Patient is alert, attentive, and oriented x 3.  Language is coherent and fluent without dysarthria or aphasia.  Memory, comprehension and ability to follow commands were intact.       Cranial nerves: No ptosis at baseline or after sustained upgaze. Eye closure strength is normal today. No diplopia even after prolonged upgaze. Face and tongue strong.     Motor exam: No atrophy or fasciculations.   Manual muscle testing revealed the following MRC grade muscle power:   Right Left   Neck flexion 5    Neck extension: 5    Shoulder abduction:  5 5   Elbow extension: 5 5   Elbow flexion:  5 5   Wrist flexion:   5 5   Wrist extension:  5 5   FDI 5 5   Hip flexion 5 5   Knee extension 5 5   Dorsiflexion 5 5   Plantar flexion 5 5     Complex motor skills: No tremor or ataxia     Sensory exam: Normal vibration and pin in hands and feet    Gait narrow and stable.     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 Since his last set-back in the spring he is back to essentially no MG manifestations. As it has now only been about 3 months since we increased the mycohenolate dose we will not make any prednisone changes today. When I see him back next will resume slow prednisone taper as long as he continues to do well. We will try to reduce mestinon to the lowest effective dose. If he is able to reduce without symptom worsening this also will give us more confidence that his MG is under optimal control.     Plan:      1. Mestinon: Reduce from 120 in the morning, 120 at midday, and 60 at dinner time by 60 mg every few days to titrate to lowest effective dose.   2. Hold prednisone at 10 mg daily for now. If stable at next visit anticipate initiating very slow taper, probable 10/7.5 alternating for 4 weeks, then 7.5 daily for 4 weeks, and so on.   3. Continue vitamin D 800 IU and calcium 1,000mg daily. Continue GI prophylaxis (pepcid 20 mg daily)  4. Continue mycophenolate from 1500/1000 mg (total dose 2500 mg daily).   5. Monitoring labs: Lasted check 5/18. Will repeat CBC today.   6. Discussed thymectomy. He is not interested at this time. If disease become more challenging to manage then will reconsider.   7. If he develops inability to swallow of shortness of breath he should seek urgent medical attention and then let me know. If he develops worsening ocular symptoms, dysarthria, or limb weakness he should let me know ASAP.   8. Follow  up with me in 3 months. Sooner if needed.   ----  ADDENDUM: Labs 9/19/19 showed normal CBC

## 2019-09-19 ENCOUNTER — TRANSFERRED RECORDS (OUTPATIENT)
Dept: HEALTH INFORMATION MANAGEMENT | Facility: CLINIC | Age: 59
End: 2019-09-19

## 2019-09-30 ENCOUNTER — HEALTH MAINTENANCE LETTER (OUTPATIENT)
Age: 59
End: 2019-09-30

## 2019-11-16 ENCOUNTER — MYC REFILL (OUTPATIENT)
Dept: NEUROLOGY | Facility: CLINIC | Age: 59
End: 2019-11-16

## 2019-11-16 DIAGNOSIS — G70.00 MYASTHENIA GRAVIS WITHOUT EXACERBATION (H): ICD-10-CM

## 2019-11-16 DIAGNOSIS — H49.9 OPHTHALMOPLEGIA: ICD-10-CM

## 2019-11-18 ENCOUNTER — TELEPHONE (OUTPATIENT)
Dept: NEUROLOGY | Facility: CLINIC | Age: 59
End: 2019-11-18

## 2019-11-18 RX ORDER — PYRIDOSTIGMINE BROMIDE 60 MG/1
TABLET ORAL
Qty: 150 TABLET | Refills: 5 | Status: SHIPPED | OUTPATIENT
Start: 2019-11-18

## 2019-11-18 RX ORDER — PYRIDOSTIGMINE BROMIDE 60 MG/1
120 TABLET ORAL 3 TIMES DAILY
Qty: 180 TABLET | Refills: 4 | Status: SHIPPED | OUTPATIENT
Start: 2019-11-18 | End: 2020-03-10

## 2019-11-18 NOTE — TELEPHONE ENCOUNTER
Pt is requesting that this medication be refilled asap as he is leaving town on Friday.  Please call pt when the prescription has been routed to the pharmacy so he knows it's been addressed.

## 2019-11-18 NOTE — TELEPHONE ENCOUNTER
I called this patient and left voicemail that Dr. Jewell signed his mestinon prescription and sent to his pharmacy this morning.     Ashley Pickett RN

## 2019-11-18 NOTE — TELEPHONE ENCOUNTER
Rx Authorization:    Requested Medication/ Dose pyridostigmine (MESTINON) 60 MG tablet     Date last refill ordered: 11/15/18    Quantity ordered: 150    # refills: 6    Date of last clinic visit with ordering provider: 09/10/19    Date of next clinic visit with ordering provider: 12/10/19    All pertinent protocol data (lab date/result):     Include pertinent information from patients message:

## 2019-12-01 DIAGNOSIS — G70.01 MYASTHENIA GRAVIS WITH EXACERBATION (H): ICD-10-CM

## 2019-12-02 NOTE — TELEPHONE ENCOUNTER
Rx Authorization:    Requested Medication/ Dose: Prenisone Oral 10mg    Date last refill ordered: 3/26/2019    Quantity ordered: 60    # refills: 3    Date of last clinic visit with ordering provider: 9/10/2019    Date of next clinic visit with ordering provider: none    All pertinent protocol data (lab date/result):     Include pertinent information from patients message:

## 2019-12-03 RX ORDER — PREDNISONE 10 MG/1
10 TABLET ORAL DAILY
Qty: 30 TABLET | Refills: 1 | Status: SHIPPED | OUTPATIENT
Start: 2019-12-03 | End: 2020-03-10

## 2019-12-10 ENCOUNTER — OFFICE VISIT (OUTPATIENT)
Dept: NEUROLOGY | Facility: CLINIC | Age: 59
End: 2019-12-10
Payer: COMMERCIAL

## 2019-12-10 VITALS
HEART RATE: 80 BPM | BODY MASS INDEX: 22.12 KG/M2 | DIASTOLIC BLOOD PRESSURE: 79 MMHG | SYSTOLIC BLOOD PRESSURE: 128 MMHG | WEIGHT: 142.6 LBS

## 2019-12-10 DIAGNOSIS — G70.00 MYASTHENIA GRAVIS WITHOUT EXACERBATION (H): Primary | ICD-10-CM

## 2019-12-10 RX ORDER — PREDNISONE 5 MG/1
5 TABLET ORAL 2 TIMES DAILY
Qty: 60 TABLET | Refills: 3 | Status: SHIPPED | OUTPATIENT
Start: 2019-12-10 | End: 2020-03-10

## 2019-12-10 ASSESSMENT — PAIN SCALES - GENERAL: PAINLEVEL: NO PAIN (0)

## 2019-12-10 NOTE — LETTER
12/10/2019     RE: David Vera  [address] [place] [address]     Dear Colleague,    Thank you for referring your patient, Daivd Vera, to the [facility] NEUROSPECIALTIES at [facility]. Please see a copy of my visit note below.    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 In 2017 he developed drooping of the left eyelid. In August he developed diplopia. In  he developed limb weakness. No shortness of breath. In early  he started mestinon, which helped ptosis. In mid  he started prednisone: 10 mg daily and then increasing to 20 mg daily after about a week. After prednisone was started he reported less eyelid ptosis and overall improved strength - but not normal. He increased to 40 mg in early . In  he also started mycophenolate. Prednisone was slowly tapered to 20 mg daily in 2018. By 18 he was at prednisone 10 mg daily. In  he developed some recurrence of diplopia and so we held at 10 mg for a couple months. Diplopia resolved. In the spring 2019 he reduced prednisone to 7.5 mg daily and developed diplopia. In  predniosne was increased back to 10 mg daily and mycophenolate to 2500 mg daily.     Interval history:  I last saw him 9/10/19. He continues to do well. Since his last visit he has been symptom free. No diplopia, ptosis, dysarthria, dysphagia, shortness of breath or limb weakness. He just returned from a [place] ski trip. Was able to ski without limitation. MG meds include prednisone 10 mg daily and mycophenolate to 2500 mg daily. He continues mestinon 60 mg BID. He did try to reduce to every day about 2 months ago, but thought maybe his vision was worse and so he bumped it back to BID.      Prior pertinent laboratory work-up:  : AChR binding ab 0.7 nmol/L  (N < 0.4). TSH normal.   19: Normal CBC    Prior imagin/17:  CT chest showed no evidence of thymoma. No mediastinal mass or lymphadenopathy.    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 Decrement was about 40% at the facial-nasalis.      Past Medical History:   Myasthenia gravis    Past Surgical History:  Sinus surgery    Family history:    There is no known family history of myopathy or other neuromuscular disorders.    Social History:    He denies tobacco, alcohol, or illicit drug use.     Medical Allergies:  NKDA     Current Medications:    Current Outpatient Medications   Medication     CELLCEPT (BRAND) 500 MG TABLET     mycophenolate (GENERIC EQUIVALENT) 500 MG tablet     predniSONE (DELTASONE) 10 MG tablet     predniSONE (DELTASONE) 5 MG tablet     predniSONE (DELTASONE) 5 MG tablet     pyridostigmine (MESTINON) 60 MG tablet     pyridostigmine (MESTINON) 60 MG tablet     predniSONE (DELTASONE) 10 MG tablet     No current facility-administered medications for this visit.      Review of Systems: A review of systems was obtained and was negative except for what was noted above.    Physical examination:    /79 (BP Location: Left arm, Patient Position: Sitting, Cuff Size: Adult Regular)   Pulse 80   Wt 64.7 kg (142 lb 9.6 oz)   BMI 22.12 kg/m       General Appearance: NAD    Skin: There are no rashes or other skin lesions.    Musculoskeletal:  There is no scoliosis, lordosis, kyphosis, pes cavus, or hammertoes.    Neurologic examination:    Mental status:  Patient is alert, attentive, and oriented x 3.  Language is coherent and fluent without dysarthria or aphasia.  Memory, comprehension and ability to follow commands were intact.       Cranial nerves: No ptosis or diplopia at baseline or after sustained upgaze. Eye closure strength is full. Face and tongue strong.     Motor exam: No atrophy or fasciculations.   Manual muscle testing revealed the  following MRC grade muscle power:   Right Left   Neck flexion 5    Neck extension: 5    Shoulder abduction:  5 5   Elbow extension: 5 5   Elbow flexion:  5 5   Wrist flexion:  5 5   Wrist extension:  5 5   FDI 5 5   Hip flexion 5 5   Knee extension 5 5   Dorsiflexion 5 5   Plantar flexion 5 5     Complex motor skills: No tremor or ataxia     Sensory exam: Normal vibration and pin in hands and feet    Gait narrow and stable.     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 Currently he has no symptoms or signs of MG. It has now been about 6 months since he escalated mycophenolate to 2500 mg daily, and hence reasonable to give prednisone tapering another try. We will again try to reduce prednisone to the lowest effective dose. As prednisone tapering failed when last tried in the spring 2019 we will take a very conservative tapering approach as below.     Plan:      1. Mestinon: Continue 60 mg BID  2. Prednisone: Reduce from 10 mg daily to 10 mg and 7.5 mg on alternating days x 6 weeks, then reduce to 7.5 mg daily.   3. Continue vitamin D 800 IU and calcium 1,000mg daily. Continue GI prophylaxis (pepcid 20 mg daily)  4. Continue mycophenolate from 1500/1000 mg (total dose 2500 mg daily).   5. Monitoring labs: Lasted check 9/19. Will repeat CBC at next visit.   6. Discussed thymectomy. He is not interested at this time. If disease become more challenging to manage then will reconsider.   7. If he develops inability to swallow of shortness of breath he should seek urgent medical attention and then let me know. If he develops worsening ocular symptoms, dysarthria, or limb weakness he should let me know ASAP.   8. Follow up with me in 3 months. Sooner if needed.   ----  Again, thank you for allowing me to participate in the  care of your patient.      Sincerely,    Go Jewell MD

## 2019-12-10 NOTE — PROGRESS NOTES
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 In 2017 he developed drooping of the left eyelid. In August he developed diplopia. In  he developed limb weakness. No shortness of breath. In early  he started mestinon, which helped ptosis. In mid  he started prednisone: 10 mg daily and then increasing to 20 mg daily after about a week. After prednisone was started he reported less eyelid ptosis and overall improved strength - but not normal. He increased to 40 mg in early . In  he also started mycophenolate. Prednisone was slowly tapered to 20 mg daily in 2018. By 18 he was at prednisone 10 mg daily. In  he developed some recurrence of diplopia and so we held at 10 mg for a couple months. Diplopia resolved. In the spring 2019 he reduced prednisone to 7.5 mg daily and developed diplopia. In  predniosne was increased back to 10 mg daily and mycophenolate to 2500 mg daily.     Interval history:  I last saw him 9/10/19. He continues to do well. Since his last visit he has been symptom free. No diplopia, ptosis, dysarthria, dysphagia, shortness of breath or limb weakness. He just returned from a [place] ski trip. Was able to ski without limitation. MG meds include prednisone 10 mg daily and mycophenolate to 2500 mg daily. He continues mestinon 60 mg BID. He did try to reduce to every day about 2 months ago, but thought maybe his vision was worse and so he bumped it back to BID.      Prior pertinent laboratory work-up:  : AChR binding ab 0.7 nmol/L  (N < 0.4). TSH normal.   19: Normal CBC    Prior imagin/17: CT chest showed no evidence of thymoma. No mediastinal mass or lymphadenopathy.    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 Decrement was about 40% at the  facial-nasalis.      Past Medical History:   Myasthenia gravis    Past Surgical History:  Sinus surgery    Family history:    There is no known family history of myopathy or other neuromuscular disorders.    Social History:    He denies tobacco, alcohol, or illicit drug use.     Medical Allergies:  NKDA     Current Medications:    Current Outpatient Medications   Medication     CELLCEPT (BRAND) 500 MG TABLET     mycophenolate (GENERIC EQUIVALENT) 500 MG tablet     predniSONE (DELTASONE) 10 MG tablet     predniSONE (DELTASONE) 5 MG tablet     predniSONE (DELTASONE) 5 MG tablet     pyridostigmine (MESTINON) 60 MG tablet     pyridostigmine (MESTINON) 60 MG tablet     predniSONE (DELTASONE) 10 MG tablet     No current facility-administered medications for this visit.      Review of Systems: A review of systems was obtained and was negative except for what was noted above.    Physical examination:    /79 (BP Location: Left arm, Patient Position: Sitting, Cuff Size: Adult Regular)   Pulse 80   Wt 64.7 kg (142 lb 9.6 oz)   BMI 22.12 kg/m      General Appearance: NAD    Skin: There are no rashes or other skin lesions.    Musculoskeletal:  There is no scoliosis, lordosis, kyphosis, pes cavus, or hammertoes.    Neurologic examination:    Mental status:  Patient is alert, attentive, and oriented x 3.  Language is coherent and fluent without dysarthria or aphasia.  Memory, comprehension and ability to follow commands were intact.       Cranial nerves: No ptosis or diplopia at baseline or after sustained upgaze. Eye closure strength is full. Face and tongue strong.     Motor exam: No atrophy or fasciculations.   Manual muscle testing revealed the following MRC grade muscle power:   Right Left   Neck flexion 5    Neck extension: 5    Shoulder abduction:  5 5   Elbow extension: 5 5   Elbow flexion:  5 5   Wrist flexion:  5 5   Wrist extension:  5 5   FDI 5 5   Hip flexion 5 5   Knee extension 5 5   Dorsiflexion 5 5    Plantar flexion 5 5     Complex motor skills: No tremor or ataxia     Sensory exam: Normal vibration and pin in hands and feet    Gait narrow and stable.     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 Currently he has no symptoms or signs of MG. It has now been about 6 months since he escalated mycophenolate to 2500 mg daily, and hence reasonable to give prednisone tapering another try. We will again try to reduce prednisone to the lowest effective dose. As prednisone tapering failed when last tried in the spring 2019 we will take a very conservative tapering approach as below.     Plan:      1. Mestinon: Continue 60 mg BID  2. Prednisone: Reduce from 10 mg daily to 10 mg and 7.5 mg on alternating days x 6 weeks, then reduce to 7.5 mg daily.   3. Continue vitamin D 800 IU and calcium 1,000mg daily. Continue GI prophylaxis (pepcid 20 mg daily)  4. Continue mycophenolate from 1500/1000 mg (total dose 2500 mg daily).   5. Monitoring labs: Lasted check 9/19. Will repeat CBC at next visit.   6. Discussed thymectomy. He is not interested at this time. If disease become more challenging to manage then will reconsider.   7. If he develops inability to swallow of shortness of breath he should seek urgent medical attention and then let me know. If he develops worsening ocular symptoms, dysarthria, or limb weakness he should let me know ASAP.   8. Follow up with me in 3 months. Sooner if needed.   ----

## 2020-01-08 DIAGNOSIS — G70.00 MYASTHENIA GRAVIS WITHOUT EXACERBATION (H): ICD-10-CM

## 2020-01-09 RX ORDER — MYCOPHENOLATE MOFETIL 500 MG/1
TABLET ORAL
Qty: 120 TABLET | Refills: 4 | Status: SHIPPED | OUTPATIENT
Start: 2020-01-09 | End: 2020-05-06

## 2020-01-09 NOTE — TELEPHONE ENCOUNTER
Rx Authorization:    Requested Medication/ Dose:500MG    Date last refill ordered: 8/26/19    Quantity ordered: 120tabs    # refills: 5    Date of last clinic visit with ordering provider: 12/10/19    Date of next clinic visit with ordering provider: 3/10/20    All pertinent protocol data (lab date/result):     Include pertinent information from patients message:

## 2020-03-10 ENCOUNTER — OFFICE VISIT (OUTPATIENT)
Dept: NEUROLOGY | Facility: CLINIC | Age: 60
End: 2020-03-10
Payer: COMMERCIAL

## 2020-03-10 DIAGNOSIS — G70.00 MYASTHENIA GRAVIS WITHOUT EXACERBATION (H): Primary | ICD-10-CM

## 2020-03-10 RX ORDER — PREDNISONE 5 MG/1
7.5 TABLET ORAL DAILY
COMMUNITY
Start: 2019-03-26 | End: 2020-07-17

## 2020-03-10 NOTE — LETTER
May 11, 2020    David Vera  [address] [place] [address]    To whom it may concern,     David Vera is a patient under my care at the [facility].     David has myasthenia gravis. This condition may impact his ability to breath normally. It is treated with medications that suppress his immune sytem. Both the disease (myasthenia gravis) and the treatment put him at high risk for COVID-19 infection.    It is my strong medical recommendation that David take all reasonable precautions to prevent a COVID-19 infection, including working from home. I urge you to mayco his work from home request during these unprecedented times.       Kind regards    Go lozano MD  [facility]  Department of Neurology

## 2020-03-10 NOTE — PROGRESS NOTES
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 In 2017 he developed drooping of the left eyelid. In August he developed diplopia. In  he developed limb weakness. No shortness of breath. In early  he started mestinon, which helped ptosis. In mid  he started prednisone: 10 mg daily and then increasing to 20 mg daily after about a week. After prednisone was started he reported less eyelid ptosis and overall improved strength - but not normal. He increased to 40 mg in early . In  he also started mycophenolate. Prednisone was slowly tapered to 20 mg daily in 2018. By 18 he was at prednisone 10 mg daily. In  he developed some recurrence of diplopia and so we held at 10 mg for a couple months. Diplopia resolved. In the spring 2019 he reduced prednisone to 7.5 mg daily and developed diplopia. In  predniosne was increased back to 10 mg daily and mycophenolate to 2500 mg daily. In  he reduced pred to 10 alternating with 7.5 and in late  he reduced to 7.5 mg daily.     Interval history:  I last saw him 12/10/19. He has done well since his last visit. He has been able to reduce prednisone from 10 mg daily to 10 and 7.5 alternating and now 7.5 mg daily. He has been at 7.5 mg daily for about 1 month. He has had no relapses or deteriorations. He reports no diplopia, ptosis, dysarthria, dysphagia, shortness of breath or limb weakness. He has remained active skiing over the winter. He also frequently runs and bikes. He continues  mycophenolate 2500 mg daily. He continues mestinon 60 mg BID.sometimes he misses a dose - symptoms do not worsen.     Prior pertinent laboratory work-up:  : AChR binding ab 0.7 nmol/L  (N < 0.4). TSH normal.   19: Normal CBC    Prior imagin/17: CT chest showed no evidence of thymoma. No mediastinal mass or lymphadenopathy.    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 Decrement was about 40% at the facial-nasalis.      Past Medical History:   Myasthenia gravis    Past Surgical History:  Sinus surgery    Family history:    There is no known family history of myopathy or other neuromuscular disorders.    Social History:    He denies tobacco, alcohol, or illicit drug use.     Medical Allergies:  NKDA     Current Medications:    Current Outpatient Medications   Medication     mycophenolate (GENERIC EQUIVALENT) 500 MG tablet     predniSONE (DELTASONE) 5 MG tablet     pyridostigmine (MESTINON) 60 MG tablet     CELLCEPT (BRAND) 500 MG TABLET     No current facility-administered medications for this visit.      Review of Systems: A review of systems was obtained and was negative except for what was noted above.    Physical examination:      General Appearance: NAD    Skin: There are no rashes or other skin lesions.    Musculoskeletal:  No scoliosis, lordosis, kyphosis, pes cavus, or hammertoes.    Neurologic examination:    Mental status:  Patient is alert, attentive, and oriented x 3.  Language is coherent and fluent without aphasia.  Memory, comprehension and ability to follow commands were intact.       Cranial nerves: No ptosis or diplopia at baseline or after 60 seconds of upgaze. Eye closure strength is full. No dysarthria. Face and tongue strong.     Motor exam: No atrophy or fasciculations.   Manual muscle testing revealed the following MRC grade muscle power:   Right Left   Neck flexion 5    Neck extension: 5    Shoulder abduction:  5 5   Elbow extension: 5 5   Elbow flexion:  5 5   Wrist flexion:  5 5   Wrist extension:  5 5   FDI 5 5   Hip flexion 5 5   Knee extension 5 5   Dorsiflexion 5 5   Plantar flexion 5 5     Complex motor skills: No tremor or ataxia     Sensory exam: Normal vibration and pin in hands and feet    Gait narrow and  stable.     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 He continues to do well and has tolerated prednisone tapering. He is now in pharmaceutical induced remission. He has no symptoms or signs of MG. Will continue very slow prednisone tapering as below. David also asks very appropriate questions about Covid-19. Considering his MG (even though well controlled) and immunosuppression he is considered high risk. I reviewed the CDC recommendations regarding hygiene, travel and avoidance of potential exposures.     Plan:      1. Mestinon: Continue 60 mg BID  2. Prednisone: Reduce from 7.5 mg daily to 7.5 mg and 5 mg on alternating days x about 6 weeks (until May 1, then reduce to 5 mg daily.   3. Continue vitamin D 800 IU and calcium 1,000mg daily. Continue GI prophylaxis (pepcid 20 mg daily)  4. Continue mycophenolate from 1500/1000 mg (total dose 2500 mg daily).   5. Monitoring labs: Lasted check 9/19. Will repeat CBC today  6. Discussed thymectomy. He is not interested at this time. If disease become more challenging to manage then will reconsider.  7. Covid-19 precautions: Advised him to avoid airplane travel and large crowds. Also encouraged ferquent hand washing and other hygeine measures.   8. If he develops inability to swallow of shortness of breath he should seek urgent medical attention and then let me know. If he develops worsening ocular symptoms, dysarthria, or limb weakness he should let me know ASAP.   9. Follow up with me in 3 months. Sooner if needed.   ----

## 2020-03-10 NOTE — LETTER
3/10/2020       RE: David Vera  [address] [place] [address]     Dear Colleague,    Thank you for referring your patient, David Vera, to the [facility] at [facility]. Please see a copy of my visit note below.    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 In August 2017 he developed drooping of the left eyelid. In August he developed diplopia. In 11/17 he developed limb weakness. No shortness of breath. In early 11/17 he started mestinon, which helped ptosis. In mid 11/17 he started prednisone: 10 mg daily and then increasing to 20 mg daily after about a week. After prednisone was started he reported less eyelid ptosis and overall improved strength - but not normal. He increased to 40 mg in early 2018. In 12/17 he also started mycophenolate. Prednisone was slowly tapered to 20 mg daily in 7/2018. By 9/1/18 he was at prednisone 10 mg daily. In 11/18 he developed some recurrence of diplopia and so we held at 10 mg for a couple months. Diplopia resolved. In the spring 2019 he reduced prednisone to 7.5 mg daily and developed diplopia. In 6/19 predniosne was increased back to 10 mg daily and mycophenolate to 2500 mg daily. In 12/19 he reduced pred to 10 alternating with 7.5 and in late 1/20 he reduced to 7.5 mg daily.     Interval history:  I last saw him 12/10/19. He has done well since his last visit. He has been able to reduce prednisone from 10 mg daily to 10 and 7.5 alternating and now 7.5 mg daily. He has been at 7.5 mg daily for about 1 month. He has had no relapses or deteriorations. He reports no diplopia, ptosis, dysarthria, dysphagia, shortness of breath or limb weakness. He has remained active skiing over the winter. He also frequently runs and bikes. He continues  mycophenolate 2500 mg daily. He continues mestinon 60 mg BID.sometimes he misses a dose -  symptoms do not worsen.     Prior pertinent laboratory work-up:  : AChR binding ab 0.7 nmol/L  (N < 0.4). TSH normal.   19: Normal CBC    Prior imagin/17: CT chest showed no evidence of thymoma. No mediastinal mass or lymphadenopathy.    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 Decrement was about 40% at the facial-nasalis.      Past Medical History:   Myasthenia gravis    Past Surgical History:  Sinus surgery    Family history:    There is no known family history of myopathy or other neuromuscular disorders.    Social History:    He denies tobacco, alcohol, or illicit drug use.     Medical Allergies:  NKDA     Current Medications:    Current Outpatient Medications   Medication     mycophenolate (GENERIC EQUIVALENT) 500 MG tablet     predniSONE (DELTASONE) 5 MG tablet     pyridostigmine (MESTINON) 60 MG tablet     CELLCEPT (BRAND) 500 MG TABLET     No current facility-administered medications for this visit.      Review of Systems: A review of systems was obtained and was negative except for what was noted above.    Physical examination:      General Appearance: NAD    Skin: There are no rashes or other skin lesions.    Musculoskeletal:  No scoliosis, lordosis, kyphosis, pes cavus, or hammertoes.    Neurologic examination:    Mental status:  Patient is alert, attentive, and oriented x 3.  Language is coherent and fluent without aphasia.  Memory, comprehension and ability to follow commands were intact.       Cranial nerves: No ptosis or diplopia at baseline or after 60 seconds of upgaze. Eye closure strength is full. No dysarthria. Face and tongue strong.     Motor exam: No atrophy or fasciculations.   Manual muscle testing revealed the following MRC grade muscle power:   Right Left   Neck flexion 5    Neck extension: 5    Shoulder abduction:  5 5   Elbow extension: 5 5   Elbow  flexion:  5 5   Wrist flexion:  5 5   Wrist extension:  5 5   FDI 5 5   Hip flexion 5 5   Knee extension 5 5   Dorsiflexion 5 5   Plantar flexion 5 5     Complex motor skills: No tremor or ataxia     Sensory exam: Normal vibration and pin in hands and feet    Gait narrow and stable.     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 He continues to do well and has tolerated prednisone tapering. He is now in pharmaceutical induced remission. He has no symptoms or signs of MG. Will continue very slow prednisone tapering as below. David also asks very appropriate questions about Covid-19. Considering his MG (even though well controlled) and immunosuppression he is considered high risk. I reviewed the CDC recommendations regarding hygiene, travel and avoidance of potential exposures.     Plan:      1. Mestinon: Continue 60 mg BID  2. Prednisone: Reduce from 7.5 mg daily to 7.5 mg and 5 mg on alternating days x about 6 weeks (until May 1, then reduce to 5 mg daily.   3. Continue vitamin D 800 IU and calcium 1,000mg daily. Continue GI prophylaxis (pepcid 20 mg daily)  4. Continue mycophenolate from 1500/1000 mg (total dose 2500 mg daily).   5. Monitoring labs: Lasted check 9/19. Will repeat CBC today  6. Discussed thymectomy. He is not interested at this time. If disease become more challenging to manage then will reconsider.  7. Covid-19 precautions: Advised him to avoid airplane travel and large crowds. Also encouraged ferquent hand washing and other hygeine measures.   8. If he develops inability to swallow of shortness of breath he should seek urgent medical attention and then let me know. If he develops worsening  ocular symptoms, dysarthria, or limb weakness he should let me know ASAP.   9. Follow up with me in 3 months. Sooner if needed.   ----  Go Jewell MD

## 2020-03-11 DIAGNOSIS — G70.00 MYASTHENIA GRAVIS WITHOUT EXACERBATION (H): ICD-10-CM

## 2020-03-11 LAB
ERYTHROCYTE [DISTWIDTH] IN BLOOD BY AUTOMATED COUNT: 13.5 % (ref 10–15)
HCT VFR BLD AUTO: 44.3 % (ref 40–53)
HGB BLD-MCNC: 14.1 G/DL (ref 13.3–17.7)
MCH RBC QN AUTO: 30.8 PG (ref 26.5–33)
MCHC RBC AUTO-ENTMCNC: 31.8 G/DL (ref 31.5–36.5)
MCV RBC AUTO: 97 FL (ref 78–100)
PLATELET # BLD AUTO: 323 10E9/L (ref 150–450)
RBC # BLD AUTO: 4.58 10E12/L (ref 4.4–5.9)
WBC # BLD AUTO: 6.5 10E9/L (ref 4–11)

## 2020-03-11 PROCEDURE — 36415 COLL VENOUS BLD VENIPUNCTURE: CPT | Performed by: PSYCHIATRY & NEUROLOGY

## 2020-03-11 PROCEDURE — 85027 COMPLETE CBC AUTOMATED: CPT | Performed by: PSYCHIATRY & NEUROLOGY

## 2020-05-06 DIAGNOSIS — G70.00 MYASTHENIA GRAVIS WITHOUT EXACERBATION (H): ICD-10-CM

## 2020-05-06 RX ORDER — MYCOPHENOLATE MOFETIL 500 MG/1
TABLET ORAL
Qty: 150 TABLET | Refills: 3 | Status: SHIPPED | OUTPATIENT
Start: 2020-05-06 | End: 2020-09-04

## 2020-05-06 NOTE — TELEPHONE ENCOUNTER
Rx Authorization:    Requested Medication/ Dose: Mycophenolate 500mg     Date last refill ordered: 1/9/20    Quantity ordered: 120    # refills: 4    Date of last clinic visit with ordering provider: 3/10/20    Date of next clinic visit with ordering provider: 6/9/20    All pertinent protocol data (lab date/result):     Include pertinent information from patients message:

## 2020-07-14 ENCOUNTER — OFFICE VISIT (OUTPATIENT)
Dept: NEUROLOGY | Facility: CLINIC | Age: 60
End: 2020-07-14
Payer: COMMERCIAL

## 2020-07-14 VITALS — WEIGHT: 137.7 LBS | BODY MASS INDEX: 21.36 KG/M2 | TEMPERATURE: 97.6 F

## 2020-07-14 DIAGNOSIS — G70.00 MYASTHENIA GRAVIS (H): Primary | ICD-10-CM

## 2020-07-14 ASSESSMENT — PAIN SCALES - GENERAL: PAINLEVEL: NO PAIN (0)

## 2020-07-14 NOTE — LETTER
7/14/2020       RE: David Vera  [address] [place] [address]     Dear Colleague,    Thank you for referring your patient, David Vera, to the [facility] NEUROSPECIALTIES at [facility]. Please see a copy of my visit note below.    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 In August 2017 he developed drooping of the left eyelid. In August he developed diplopia. In 11/17 he developed limb weakness. No shortness of breath. In early 11/17 he started mestinon, which helped ptosis. In mid 11/17 he started prednisone: 10 mg daily and then increasing to 20 mg daily after about a week. After prednisone was started he reported less eyelid ptosis and overall improved strength - but not normal. He increased to 40 mg in early 2018. In 12/17 he also started mycophenolate. Prednisone was slowly tapered to 20 mg daily in 7/2018. By 9/1/18 he was at prednisone 10 mg daily. In 11/18 he developed some recurrence of diplopia and so we held at 10 mg for a couple months. Diplopia resolved. In the spring 2019 he reduced prednisone to 7.5 mg daily and developed diplopia. In 6/19 predniosne was increased back to 10 mg daily and mycophenolate to 2500 mg daily. In 12/19 he reduced pred to 10 alternating with 7.5 and in late 1/20 he reduced to 7.5 mg daily. In 3/20 reduced to 5 mg daily.     Interval history:  We last saw him in clinic on 3/10/2020.  He has done well since his last visit. He has been able to reduce prednisone from 7.5 to 5 mg. He has been at that dose for about 3 months.  He would like to try to keep going down on the prednisone if possible.  He reports previous weight gain attributable to prednisone (when on ~30 mg) but no current side effects. He reports no diplopia, ptosis, dysarthria, dysphagia, shortness of breath or limb weakness. He continues to bicycle frequently.  He can bike from  [place] to [facility] with ease.   He continues mycophenolate 2500 mg daily. Denies recent infections or feeling unwell. Denies known side effects to the mycophenolate.   He continues to take mestinon 60 mg BID as well.  He does not miss doses with regularity but when he does miss a dose he doesn't feel more symptoms.   Denies diarrhea or other side effects.    Prior pertinent laboratory work-up:  : AChR binding ab 0.7 nmol/L  (N < 0.4). TSH normal.   19: Normal CBC  3/11/2020 Normal CBC    Prior imagin/17: CT chest showed no evidence of thymoma. No mediastinal mass or lymphadenopathy.    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 Decrement was about 40% at the facial-nasalis.      Past Medical History:   Myasthenia gravis    Past Surgical History:  Sinus surgery    Family history:    There is no known family history of myopathy or other neuromuscular disorders.    Social History:    He denies tobacco, alcohol, or illicit drug use.     Medical Allergies:  NKDA     Current Medications:    Current Outpatient Medications   Medication     mycophenolate (GENERIC EQUIVALENT) 500 MG tablet     predniSONE (DELTASONE) 5 MG tablet     CELLCEPT (BRAND) 500 MG TABLET     pyridostigmine (MESTINON) 60 MG tablet     No current facility-administered medications for this visit.      Review of Systems: A review of systems was obtained and was negative except for what was noted above.    Physical examination:      General Appearance: NAD    Skin: There are no rashes or other skin lesions.    Musculoskeletal:  No scoliosis, lordosis, kyphosis, pes cavus, or hammertoes.    Neurologic examination:    Mental status:  Patient is alert, attentive, and oriented x 3.  Language is coherent and fluent without aphasia.  Memory, comprehension and ability to follow commands were intact.       Cranial  nerves: No ptosis or diplopia at baseline. After 60 seconds of upgaze the slightest bit of eyelid ptosis emerges. Eye closure strength is full. No dysarthria. Face and tongue strong.     Motor exam: No atrophy or fasciculations.   Manual muscle testing revealed the following MRC grade muscle power:     Right Left   Neck flexion 5    Neck extension: 5    Shoulder abduction:  5 5   Elbow extension: 5 5   Elbow flexion:  5 5   Wrist flexion:  5 5   Wrist extension:  5 5   FDI 5 5   Hip flexion 5 5   Knee extension 5 5   Dorsiflexion 5 5   Plantar flexion 5 5     Complex motor skills: No tremor or ataxia     Sensory exam: Normal vibration and pin in hands and feet    Gait narrow and stable.     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 He continues to do well and has tolerated prednisone tapering. He reamins in pharmaceutical induced remission. He has no symptoms and only minimal signs of MG. Exam as able is stable.  Will continue very slow prednisone tapering as below.     Plan:      1. Mestinon: Continue 60 mg BID as needed. He wean off as tolerated.   2. Prednisone: Reduce from 5 mg daily to 5/2.5 mg on alternating days x 1 month, then 2.5 mg daily x 1 month, then 2.5 mg/0 alternating x 1 month, then stop prednisone (all as tolerated).   3. He can discontinue prednisone collateral therapy  4. Continue mycophenolate from 1500/1000 mg (total dose 2500 mg daily).   5. Monitoring labs: WBC count from 7/10/20 was slightly low at 3.8. Will need to keep an eye on this. Will repeat CBC at next visit. If WBC lower will need to reduce mycophenolate dose.   6.  Discussed thymectomy. He is not interested at this time. If disease become more challenging to manage then will reconsider.  7. Covid-19 precautions: Advised him to avoid airplane travel and large crowds. Also encouraged ferquent hand washing and other hygeine measures, he expressed an understanding.   8. If he develops inability to swallow of shortness of breath he should seek urgent medical attention and then let me know. If he develops worsening ocular symptoms, dysarthria, or limb weakness he should let me know ASAP.   9. Follow up in 3 months. Sooner if needed.     Seen with Dr. Jewell, neuromuscular attending. His addendum follows.     Daniel Martinez   PGY-5 Neurology  Clinical Neurophysiology Fellow  -----  ADDENDUM:  I personally interviewed and examined the patient. I agree with the history, physical, assessment, and plan as documented above.       Again, thank you for allowing me to participate in the care of your patient.      Sincerely,    Go Jewell MD

## 2020-07-17 DIAGNOSIS — G70.00 MYASTHENIA GRAVIS WITHOUT EXACERBATION (H): Primary | ICD-10-CM

## 2020-07-20 RX ORDER — PREDNISONE 5 MG/1
TABLET ORAL
Qty: 60 TABLET | Refills: 0 | Status: SHIPPED | OUTPATIENT
Start: 2020-07-20 | End: 2020-10-27

## 2020-09-04 ENCOUNTER — MYC REFILL (OUTPATIENT)
Dept: NEUROLOGY | Facility: CLINIC | Age: 60
End: 2020-09-04

## 2020-09-04 DIAGNOSIS — G70.00 MYASTHENIA GRAVIS WITHOUT EXACERBATION (H): ICD-10-CM

## 2020-09-08 RX ORDER — MYCOPHENOLATE MOFETIL 500 MG/1
TABLET ORAL
Qty: 150 TABLET | Refills: 3 | Status: SHIPPED | OUTPATIENT
Start: 2020-09-08 | End: 2020-12-30

## 2020-09-09 RX ORDER — MYCOPHENOLATE MOFETIL 500 MG/1
TABLET ORAL
Qty: 150 TABLET | Refills: 0 | OUTPATIENT
Start: 2020-09-09

## 2020-10-27 DIAGNOSIS — G70.00 MYASTHENIA GRAVIS WITHOUT EXACERBATION (H): ICD-10-CM

## 2020-10-27 RX ORDER — PREDNISONE 5 MG/1
TABLET ORAL
Qty: 60 TABLET | Refills: 0 | Status: SHIPPED | OUTPATIENT
Start: 2020-10-27

## 2020-12-01 ENCOUNTER — VIRTUAL VISIT (OUTPATIENT)
Dept: NEUROLOGY | Facility: CLINIC | Age: 60
End: 2020-12-01
Payer: COMMERCIAL

## 2020-12-01 DIAGNOSIS — G70.00 MYASTHENIA GRAVIS WITHOUT EXACERBATION (H): Primary | ICD-10-CM

## 2020-12-01 NOTE — PROGRESS NOTES
"David Vera is a 60 year old male who is being evaluated via a billable video visit.      The patient has been notified of following:     \"This video visit will be conducted via a call between you and your physician/provider. We have found that certain health care needs can be provided without the need for an in-person physical exam.  This service lets us provide the care you need with a video conversation.  If a prescription is necessary we can send it directly to your pharmacy.  If lab work is needed we can place an order for that and you can then stop by our lab to have the test done at a later time.    Video visits are billed at different rates depending on your insurance coverage.  Please reach out to your insurance provider with any questions.    If during the course of the call the physician/provider feels a video visit is not appropriate, you will not be charged for this service.\"    Patient has given verbal consent for Video visit? Yes  How would you like to obtain your AVS? MyChart  If you are dropped from the video visit, the video invite should be resent to: Send to e-mail at: [email or web address]  Will anyone else be joining your video visit? No        Video-Visit Details    Type of service:  Video Visit    Video Start Time: 3:40 PM  Video End Time: 4:05 PM    Originating Location (pt. Location): Home    Distant Location (provider location):  [facility] NEUROSPECIALTIES     Platform used for Video Visit: Sisi    See my separate note from today's date for documentation of the virtual visit.     Go Jewell MD  Department of Neurology    "

## 2020-12-01 NOTE — PROGRESS NOTES
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 In August 2017 he developed drooping of the left eyelid. In August he developed diplopia. In 11/17 he developed limb weakness. No shortness of breath. In early 11/17 he started mestinon, which helped ptosis. In mid 11/17 he started prednisone: 10 mg daily and then increasing to 20 mg daily after about a week. After prednisone was started he reported less eyelid ptosis and overall improved strength - but not normal. He increased to 40 mg in early 2018. In 12/17 he also started mycophenolate. Prednisone was slowly tapered to 20 mg daily in 7/2018. By 9/1/18 he was at prednisone 10 mg daily. In 11/18 he developed some recurrence of diplopia and so we held at 10 mg for a couple months. Diplopia resolved. In the spring 2019 he reduced prednisone to 7.5 mg daily and developed diplopia. In 6/19 predniosne was increased back to 10 mg daily and mycophenolate to 2500 mg daily. In 12/19 he reduced pred to 10 alternating with 7.5 and in late 1/20 he reduced to 7.5 mg daily. In 3/20 reduced to 5 mg daily. Through 2020 he continued to slowly taper, by 12/20 he was down to prednisone 2.5 mg every other day.     Interval history:  Due to the coronavirus pandemic today's visit was performed virtually by video. I last saw him in clinic 7/14/2020. He has been able to wean prednisone down to 2.5 mg every other day. He has been at that dose for about 1 month. He also has reduced mestinon to once daily. He continues continue mycophenolate 1500/1000 mg. He has had no setbacks or relapses. No diplopia, ptosis, dysarthria, dysphagia, shortness of breath or limb weakness. He continues to exercise frequently.  With mestinon he does not really notice when he takes it. Denies diarrhea or other side effects.    Prior pertinent laboratory work-up:  11/17: AChR binding ab 0.7 nmol/L  (N < 0.4). TSH normal.   9/19/19: Normal  CBC  3/11/2020 Normal CBC    Prior imagin/17: CT chest showed no evidence of thymoma. No mediastinal mass or lymphadenopathy.    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 Decrement was about 40% at the facial-nasalis.      Past Medical History:   Myasthenia gravis    Past Surgical History:  Sinus surgery    Family history:    There is no known family history of myopathy or other neuromuscular disorders.    Social History:    He denies tobacco, alcohol, or illicit drug use.     Medical Allergies:  NKDA     Current Medications:    Current Outpatient Medications   Medication     mycophenolate (GENERIC EQUIVALENT) 500 MG tablet     predniSONE (DELTASONE) 5 MG tablet     pyridostigmine (MESTINON) 60 MG tablet     CELLCEPT (BRAND) 500 MG TABLET     No current facility-administered medications for this visit.      Review of Systems: A review of systems was obtained and was negative except for what was noted above.    Physical examination:      General Appearance: NAD    Skin: There are no rashes or other skin lesions.    Musculoskeletal:  No scoliosis, lordosis, kyphosis, pes cavus, or hammertoes.    Neurologic examination:    Mental status:  Patient is alert, attentive, and oriented x 3.  Language is coherent and fluent without aphasia.  Memory, comprehension and ability to follow commands were intact.       Cranial nerves: No ptosis or diplopia at baseline. Sometimes with right gaze he reports some diplopia. After 60 seconds of upgaze there is the slightest bit of right ptosis. Eye closure strength appears full. Smile full. No dysarthria.     Motor exam: No atrophy or fasciculations.  Manual muscle strength appears full in proximal and distal upper and lower limbs.     Complex motor skills: No tremor or ataxia     Sensory exam: Not tested    Gait narrow and stable.     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 He remains in pharmacologic remission. Exam and symptoms are stable with prednisone taper.  Will continue proceed as below.     Plan:      1. Mestinon: Continue 60 mg every day for another month/ If stable pending #2 then stop mestinon.   2. Prednisone: Current dose 2.5 mg every other day. I advised him to discontinue prednisone now.   3. Continue mycophenolate from 1500/1000 mg (total dose 2500 mg daily).   4. Monitoring labs: WBC count from 7/10/20 was slightly low at 3.8. Will repeat CBC now. If WBC lower will need to reduce mycophenolate dose.   5. Discussed thymectomy. He is not interested at this time. If disease become more challenging to manage then will reconsider.  6. If he develops inability to swallow of shortness of breath he should seek urgent medical attention and then let me know. If he develops worsening ocular symptoms, dysarthria, or limb weakness he should let me know ASAP.   7. Follow up in 3 months. Sooner if needed.     -----  2/2/21: Note received from patient. He reports that \"I been experiencing muscles pain. The pain is in my biceps and above my biceps. Also, in the bottom of my foot.  My biceps feels as I have worked my biceps hard at the gym. And my foot feels as I have been running 5K every day.  I am still able to exercise, and run.\" I spoke with him about it by phone. It has been present for a few weeks. He feels like the pain is in his muscle, not the joint. Symptoms symmetric. Other muscle areas not affected. He exercises frequently, upper limb lifting. He has dropped the amount he lifts because of pain. In his feet symptoms are " "symmetric. Arch is affected. Symptoms worse in morning, and then improve with stretching. Probably musculoskeletal. Not MG and probably not med related. Advised rest from aerobic isometric exercises for a couple weeks. He will let me know if symptoms do not improve.   2/22/21: Note from patient: \"My shoulders issue/pain has gone away. My general Dr also recommended I put ice. So, resting and putting ice helped with the shoulder pain\"  "

## 2020-12-01 NOTE — LETTER
"12/1/2020       RE: David Vera  [address] [place] [address]     Dear Colleague,    Thank you for referring your patient, David Vera, to the [facility] NEUROSPECIALTIES at [facility]. Please see a copy of my visit note below.    David Vera is a 60 year old male who is being evaluated via a billable video visit.      The patient has been notified of following:     \"This video visit will be conducted via a call between you and your physician/provider. We have found that certain health care needs can be provided without the need for an in-person physical exam.  This service lets us provide the care you need with a video conversation.  If a prescription is necessary we can send it directly to your pharmacy.  If lab work is needed we can place an order for that and you can then stop by our lab to have the test done at a later time.    Video visits are billed at different rates depending on your insurance coverage.  Please reach out to your insurance provider with any questions.    If during the course of the call the physician/provider feels a video visit is not appropriate, you will not be charged for this service.\"    Patient has given verbal consent for Video visit? Yes  How would you like to obtain your AVS? MyChart  If you are dropped from the video visit, the video invite should be resent to: Send to e-mail at: [email or web address]  Will anyone else be joining your video visit? No        Video-Visit Details    Type of service:  Video Visit    Video Start Time: 3:40 PM  Video End Time: 4:05 PM    Originating Location (pt. Location): Home    Distant Location (provider location):  [facility] NEUROSPECIALTIES     Platform used for Video Visit: Sisi    See my separate note from today's date for documentation of the virtual visit.     Go Jewell MD  Department of Neurology      MG history:    David Vera is a 60 year old man with ACHR ab positive " non-thymomatous generalized myasthenia gravis. In 2017 he developed drooping of the left eyelid. In August he developed diplopia. In  he developed limb weakness. No shortness of breath. In early  he started mestinon, which helped ptosis. In mid  he started prednisone: 10 mg daily and then increasing to 20 mg daily after about a week. After prednisone was started he reported less eyelid ptosis and overall improved strength - but not normal. He increased to 40 mg in early . In  he also started mycophenolate. Prednisone was slowly tapered to 20 mg daily in 2018. By 18 he was at prednisone 10 mg daily. In  he developed some recurrence of diplopia and so we held at 10 mg for a couple months. Diplopia resolved. In the spring 2019 he reduced prednisone to 7.5 mg daily and developed diplopia. In  predniosne was increased back to 10 mg daily and mycophenolate to 2500 mg daily. In  he reduced pred to 10 alternating with 7.5 and in late  he reduced to 7.5 mg daily. In 3/20 reduced to 5 mg daily. Through  he continued to slowly taper, by  he was down to prednisone 2.5 mg every other day.     Interval history:  Due to the coronavirus pandemic today's visit was performed virtually by video. I last saw him in clinic 2020. He has been able to wean prednisone down to 2.5 mg every other day. He has been at that dose for about 1 month. He also has reduced mestinon to once daily. He continues continue mycophenolate 1500/1000 mg. He has had no setbacks or relapses. No diplopia, ptosis, dysarthria, dysphagia, shortness of breath or limb weakness. He continues to exercise frequently.  With mestinon he does not really notice when he takes it. Denies diarrhea or other side effects.    Prior pertinent laboratory work-up:  : AChR binding ab 0.7 nmol/L  (N < 0.4). TSH normal.   19: Normal CBC  3/11/2020 Normal CBC    Prior imagin/17: CT chest showed no  evidence of thymoma. No mediastinal mass or lymphadenopathy.    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 Decrement was about 40% at the facial-nasalis.      Past Medical History:   Myasthenia gravis    Past Surgical History:  Sinus surgery    Family history:    There is no known family history of myopathy or other neuromuscular disorders.    Social History:    He denies tobacco, alcohol, or illicit drug use.     Medical Allergies:  NKDA     Current Medications:    Current Outpatient Medications   Medication     mycophenolate (GENERIC EQUIVALENT) 500 MG tablet     predniSONE (DELTASONE) 5 MG tablet     pyridostigmine (MESTINON) 60 MG tablet     CELLCEPT (BRAND) 500 MG TABLET     No current facility-administered medications for this visit.      Review of Systems: A review of systems was obtained and was negative except for what was noted above.    Physical examination:      General Appearance: NAD    Skin: There are no rashes or other skin lesions.    Musculoskeletal:  No scoliosis, lordosis, kyphosis, pes cavus, or hammertoes.    Neurologic examination:    Mental status:  Patient is alert, attentive, and oriented x 3.  Language is coherent and fluent without aphasia.  Memory, comprehension and ability to follow commands were intact.       Cranial nerves: No ptosis or diplopia at baseline. Sometimes with right gaze he reports some diplopia. After 60 seconds of upgaze there is the slightest bit of right ptosis. Eye closure strength appears full. Smile full. No dysarthria.     Motor exam: No atrophy or fasciculations.  Manual muscle strength appears full in proximal and distal upper and lower limbs.     Complex motor skills: No tremor or ataxia     Sensory exam: Not tested    Gait narrow and stable.     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 He remains in pharmacologic remission. Exam and symptoms are stable with prednisone taper.  Will continue proceed as below.     Plan:      1. Mestinon: Continue 60 mg every day for another month/ If stable pending #2 then stop mestinon.   2. Prednisone: Current dose 2.5 mg every other day. I advised him to discontinue prednisone now.   3. Continue mycophenolate from 1500/1000 mg (total dose 2500 mg daily).   4. Monitoring labs: WBC count from 7/10/20 was slightly low at 3.8. Will repeat CBC now. If WBC lower will need to reduce mycophenolate dose.   5. Discussed thymectomy. He is not interested at this time. If disease become more challenging to manage then will reconsider.  6. If he develops inability to swallow of shortness of breath he should seek urgent medical attention and then let me know. If he develops worsening ocular symptoms, dysarthria, or limb weakness he should let me know ASAP.   7. Follow up in 3 months. Sooner if needed.   Clinical Neurophysiology Fellow  -----    Go Jewell MD

## 2020-12-07 ENCOUNTER — TRANSFERRED RECORDS (OUTPATIENT)
Dept: HEALTH INFORMATION MANAGEMENT | Facility: CLINIC | Age: 60
End: 2020-12-07

## 2020-12-07 LAB
BASOPHILS - ABS (DIFF) - HISTORICAL: 0 THOU/CU MM
BASOPHILS NFR BLD AUTO: 0.2 %
EOSINOPHIL COUNT (ABSOLUTE): 0 THOU/CU MM
EOSINOPHIL NFR BLD AUTO: 0.7 %
ERYTHROCYTE [DISTWIDTH] IN BLOOD BY AUTOMATED COUNT: 13.4 % (ref 11.5–15.5)
HCT VFR BLD AUTO: 45.6 % (ref 37–53)
HEMOGLOBIN: 14.6 G/DL (ref 13.5–17.5)
LYMPHOCYTES # BLD AUTO: 1.3 THOU/CU MM (ref 0.9–2.9)
LYMPHOCYTES NFR BLD AUTO: 27.1 %
MCH RBC QN AUTO: 30.5 PG (ref 26–34)
MCHC RBC AUTO-ENTMCNC: 32 G/DL (ref 32–36)
MCV RBC AUTO: 95 FL (ref 80–100)
MONOCYTES # BLD AUTO: 0.5 THOU/CU MM
MONOCYTES NFR BLD AUTO: 11.5 %
NEUTROPHILS # BLD AUTO: 2.8 THOU/CU MM (ref 1.7–7)
NEUTROPHILS NFR BLD AUTO: 60.5 %
PLATELET COUNT - QUEST: 237 THOU/CU MM (ref 140–440)
PMV BLD: 10.1 FL (ref 6.5–11)
RBC # BLD AUTO: 4.78 MIL/CU MM (ref 4.3–5.9)
WBC # BLD AUTO: 4.6 THOU/CU MM (ref 4.5–11)

## 2020-12-30 DIAGNOSIS — G70.00 MYASTHENIA GRAVIS WITHOUT EXACERBATION (H): ICD-10-CM

## 2020-12-30 RX ORDER — MYCOPHENOLATE MOFETIL 500 MG/1
TABLET ORAL
Qty: 150 TABLET | Refills: 2 | Status: SHIPPED | OUTPATIENT
Start: 2020-12-30 | End: 2021-04-12

## 2021-01-15 ENCOUNTER — HEALTH MAINTENANCE LETTER (OUTPATIENT)
Age: 61
End: 2021-01-15

## 2021-03-16 ENCOUNTER — VIRTUAL VISIT (OUTPATIENT)
Dept: NEUROLOGY | Facility: CLINIC | Age: 61
End: 2021-03-16
Payer: COMMERCIAL

## 2021-03-16 DIAGNOSIS — G70.00 MYASTHENIA GRAVIS WITHOUT EXACERBATION (H): Primary | ICD-10-CM

## 2021-03-16 NOTE — PROGRESS NOTES
David is a 60 year old who is being evaluated via a billable video visit.      How would you like to obtain your AVS? MyChart  If the video visit is dropped, the invitation should be resent by: Send to e-mail at: [email or web address]  Will anyone else be joining your video visit? No      Video-Visit Details    Type of service:  Video Visit  Video Start Time: 3:33 PM  Video End Time:3:50 PM    Minutes spent on the date of the encounter doing chart review, history and exam, documentation and further activities as noted in my separate note from today: 33 minutes    Originating Location (pt. Location): Home    Distant Location (provider location):  [facility] NEUROSPECIALTIES     Platform used for Video Visit: Sisi    See my separate note from today's date for documentation of the virtual visit.     Go Jewell MD  Department of Neurology

## 2021-03-16 NOTE — LETTER
3/16/2021       RE: David Vera  [address] [place] [address]     Dear Colleague,    Thank you for referring your patient, David Vera, to the [facility] at [facility]. Please see a copy of my visit note below.    David is a 60 year old who is being evaluated via a billable video visit.      How would you like to obtain your AVS? MyChart  If the video visit is dropped, the invitation should be resent by: Send to e-mail at: [email or web address]  Will anyone else be joining your video visit? No      Video-Visit Details    Type of service:  Video Visit  Video Start Time: 3:33 PM  Video End Time:3:50 PM    Minutes spent on the date of the encounter doing chart review, history and exam, documentation and further activities as noted in my separate note from today: 33 minutes    Originating Location (pt. Location): Home    Distant Location (provider location):  [facility]     Platform used for Video Visit: Sisi    See my separate note from today's date for documentation of the virtual visit.     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 In August 2017 he developed drooping of the left eyelid. In August he developed diplopia. In 11/17 he developed limb weakness. No shortness of breath. In early 11/17 he started mestinon, which helped ptosis. In mid 11/17 he started prednisone: 10 mg daily and then increasing to 20 mg daily after about a week. After prednisone was started he reported less eyelid ptosis and overall improved strength - but not normal. He increased to 40 mg in early 2018. In 12/17 he also started mycophenolate. Prednisone was slowly tapered to 20 mg daily in 7/2018. By 9/1/18 he was at prednisone 10 mg daily. In 11/18 he developed some recurrence of diplopia and so we held at 10 mg for a couple  months. Diplopia resolved. In the spring 2019 he reduced prednisone to 7.5 mg daily and developed diplopia. In  predniosne was increased back to 10 mg daily and mycophenolate to 2500 mg daily. In  he reduced pred to 10 alternating with 7.5 and in late  he reduced to 7.5 mg daily. In 3/20 reduced to 5 mg daily. Through  he continued to slowly taper, by  he was down to prednisone 2.5 mg every other day, and later that month he stopped prednisone altogether.     Interval history:  Due to the coronavirus pandemic today's visit was performed virtually by video. I last saw him in clinic 2020. Since that visit he stopped prednisone. He also stopped mestinon.  He continues continue mycophenolate 1500/1000 mg. He has had no relapses and no slow deteriorations. For a brief time he had some shoulder pain, but that has improved.  No diplopia, ptosis, dysarthria, dysphagia, shortness of breath or limb weakness. He continues to exercise frequently.  No physical limitations.     Prior pertinent laboratory work-up:  : AChR binding ab 0.7 nmol/L  (N < 0.4). TSH normal.   19: Normal CBC  3/11/2020 Normal CBC  : WBC 3.8  20: WBC 4.6  21: WBC 2.9    Prior imagin/17: CT chest showed no evidence of thymoma. No mediastinal mass or lymphadenopathy.    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 Decrement was about 40% at the facial-nasalis.      Past Medical History:   Myasthenia gravis    Past Surgical History:  Sinus surgery    Family history:    There is no known family history of myopathy or other neuromuscular disorders.    Social History:    He denies tobacco, alcohol, or illicit drug use.     Medical Allergies:  NKDA     Current Medications:    Current Outpatient Medications   Medication     mycophenolate (GENERIC EQUIVALENT) 500 MG tablet     CELLCEPT (BRAND)  500 MG TABLET     predniSONE (DELTASONE) 5 MG tablet     pyridostigmine (MESTINON) 60 MG tablet     No current facility-administered medications for this visit.      Review of Systems: A review of systems was obtained and was negative except for what was noted above.    Physical examination:      General Appearance: NAD    Skin: There are no rashes or other skin lesions.    Musculoskeletal:  No scoliosis, lordosis, kyphosis, pes cavus, or hammertoes.    Neurologic examination:    Mental status:  Patient is alert, attentive, and oriented x 3.  Language is coherent and fluent without aphasia.  Memory, comprehension and ability to follow commands were intact.       Cranial nerves: No ptosis or diplopia at baseline. Sometimes with right gaze he reports some diplopia. After 60 seconds of upgaze there is the slightest bit of right ptosis. Eye closure strength appears full. Smile full. No dysarthria.     Motor exam: No atrophy or fasciculations.  Manual muscle strength appears full in proximal and distal upper and lower limbs.     Complex motor skills: No tremor or ataxia     Sensory exam: Not tested    Gait narrow and stable.     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 He is in pharmacologic remission now on MMF monotherapy. Low WBC count in 2/21 noted. Will repeat today. I hope not to make any major change sin the MMF dose as his MG is well controlled at this time, but will make a modest reduction now to 1000 mg BID at this time. Additional reduction if needed pending WBC count.      Plan:      1. Mestinon: No longer needed. Stopped 12/20.   2.  Prednisone: No longer needed. Stopped 12/20.   3. Mycophenolate: Reduce from 1500/1000 mg (total dose 2500 mg daily) to 1000 mg BID (total 2000 mg daily) due to low WBC.    4. Monitoring labs: WBC count from 2/21 was low at 2.9. Will repeat CBC now. If WBC not improved may need to reduce mycophenolate even further from 1000 mg BID.   5. If he develops inability to swallow of shortness of breath he should seek urgent medical attention and then let me know. If he develops worsening ocular symptoms, dysarthria, or limb weakness he should let me know ASAP.   6. Follow up in 3 months. Sooner if needed.   -----    Sincerely,    Go Jewell MD

## 2021-03-16 NOTE — PROGRESS NOTES
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 In August 2017 he developed drooping of the left eyelid. In August he developed diplopia. In 11/17 he developed limb weakness. No shortness of breath. In early 11/17 he started mestinon, which helped ptosis. In mid 11/17 he started prednisone: 10 mg daily and then increasing to 20 mg daily after about a week. After prednisone was started he reported less eyelid ptosis and overall improved strength - but not normal. He increased to 40 mg in early 2018. In 12/17 he also started mycophenolate. Prednisone was slowly tapered to 20 mg daily in 7/2018. By 9/1/18 he was at prednisone 10 mg daily. In 11/18 he developed some recurrence of diplopia and so we held at 10 mg for a couple months. Diplopia resolved. In the spring 2019 he reduced prednisone to 7.5 mg daily and developed diplopia. In 6/19 predniosne was increased back to 10 mg daily and mycophenolate to 2500 mg daily. In 12/19 he reduced pred to 10 alternating with 7.5 and in late 1/20 he reduced to 7.5 mg daily. In 3/20 reduced to 5 mg daily. Through 2020 he continued to slowly taper, by 12/20 he was down to prednisone 2.5 mg every other day, and later that month he stopped prednisone altogether.     Interval history:  Due to the coronavirus pandemic today's visit was performed virtually by video. I last saw him in clinic 12/1/2020. Since that visit he stopped prednisone. He also stopped mestinon.  He continues continue mycophenolate 1500/1000 mg. He has had no relapses and no slow deteriorations. For a brief time he had some shoulder pain, but that has improved.  No diplopia, ptosis, dysarthria, dysphagia, shortness of breath or limb weakness. He continues to exercise frequently.  No physical limitations.     Prior pertinent laboratory work-up:  11/17: AChR binding ab 0.7 nmol/L  (N < 0.4). TSH normal.   9/19/19: Normal CBC  3/11/2020 Normal  CBC  : WBC 3.8  20: WBC 4.6  21: WBC 2.9    Prior imagin/17: CT chest showed no evidence of thymoma. No mediastinal mass or lymphadenopathy.    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 Decrement was about 40% at the facial-nasalis.      Past Medical History:   Myasthenia gravis    Past Surgical History:  Sinus surgery    Family history:    There is no known family history of myopathy or other neuromuscular disorders.    Social History:    He denies tobacco, alcohol, or illicit drug use.     Medical Allergies:  NKDA     Current Medications:    Current Outpatient Medications   Medication     mycophenolate (GENERIC EQUIVALENT) 500 MG tablet     CELLCEPT (BRAND) 500 MG TABLET     predniSONE (DELTASONE) 5 MG tablet     pyridostigmine (MESTINON) 60 MG tablet     No current facility-administered medications for this visit.      Review of Systems: A review of systems was obtained and was negative except for what was noted above.    Physical examination:      General Appearance: NAD    Skin: There are no rashes or other skin lesions.    Musculoskeletal:  No scoliosis, lordosis, kyphosis, pes cavus, or hammertoes.    Neurologic examination:    Mental status:  Patient is alert, attentive, and oriented x 3.  Language is coherent and fluent without aphasia.  Memory, comprehension and ability to follow commands were intact.       Cranial nerves: No ptosis or diplopia at baseline. Sometimes with right gaze he reports some diplopia. After 60 seconds of upgaze there is the slightest bit of right ptosis. Eye closure strength appears full. Smile full. No dysarthria.     Motor exam: No atrophy or fasciculations.  Manual muscle strength appears full in proximal and distal upper and lower limbs.     Complex motor skills: No tremor or ataxia     Sensory exam: Not tested    Gait narrow and stable.      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 He is in pharmacologic remission now on MMF monotherapy. Low WBC count in 2/21 noted. Will repeat today. I hope not to make any major change sin the MMF dose as his MG is well controlled at this time, but will make a modest reduction now to 1000 mg BID at this time. Additional reduction if needed pending WBC count.      Plan:      1. Mestinon: No longer needed. Stopped 12/20.   2. Prednisone: No longer needed. Stopped 12/20.   3. Mycophenolate: Reduce from 1500/1000 mg (total dose 2500 mg daily) to 1000 mg BID (total 2000 mg daily) due to low WBC.    4. Monitoring labs: WBC count from 2/21 was low at 2.9. Will repeat CBC now. If WBC not improved may need to reduce mycophenolate even further from 1000 mg BID.   5. If he develops inability to swallow of shortness of breath he should seek urgent medical attention and then let me know. If he develops worsening ocular symptoms, dysarthria, or limb weakness he should let me know ASAP.   6. Follow up in 3 months. Sooner if needed.   -----  3/19/21: WBC 4.2. Improved from 2/5/21 (2.9). Will continue MMF at reduced dose (1000 mg BID) and continue to follow WBC count.  Will arrange for repeat testing before he returns to see me in June.     6/11/21: WBC 3.2. Neutrophil 1.6 (Normal 1.7-7). Next appointment with me in about a week. Will discuss with him at that time. Anticipate another small reduction in MMF dose.

## 2021-03-19 ENCOUNTER — TRANSFERRED RECORDS (OUTPATIENT)
Dept: HEALTH INFORMATION MANAGEMENT | Facility: CLINIC | Age: 61
End: 2021-03-19

## 2021-03-19 LAB
ABS BASOPHILS: 0 THOU/CU MM
ABS EOSINOPHILS: 0.1 THOU/CU MM
ABS LYMPHOCYTES: 1.2 THOU/CU MM (ref 0.9–2.9)
ABS MONOCYTE: 0.5 THOU/CU MM
ABS NEUTROPHILS: 2.5 THOU/CU MM (ref 1.7–7)
BASOPHILS NFR BLD AUTO: 0.2 %
EOSINOPHIL NFR BLD AUTO: 1.2 %
ERYTHROCYTE [DISTWIDTH] IN BLOOD BY AUTOMATED COUNT: 13.5 % (ref 11.5–15.5)
HCT VFR BLD AUTO: 43.4 % (ref 37–53)
HEMOGLOBIN: 13.9 G/DL (ref 13.5–17.5)
LYMPHOCYTES NFR BLD AUTO: 27.3 %
MCH RBC QN AUTO: 30.5 PG (ref 26–34)
MCHC RBC AUTO-ENTMCNC: 32 G/DL (ref 32–36)
MCV RBC AUTO: 95 FL (ref 80–100)
MONOCYTES NFR BLD AUTO: 12.6 %
NEUTROPHILS NFR BLD AUTO: 58.7 %
PLATELET COUNT - QUEST: 219 THOU/CU MM (ref 140–440)
PMV BLD: 9.9 FL (ref 6.5–11)
RBC # BLD AUTO: 4.55 MIL/CU MM (ref 4.3–5.9)
WBC # BLD AUTO: 4.2 THOU/CU MM (ref 4.5–11)

## 2021-03-20 ENCOUNTER — IMMUNIZATION (OUTPATIENT)
Dept: NURSING | Facility: CLINIC | Age: 61
End: 2021-03-20
Payer: COMMERCIAL

## 2021-03-20 PROCEDURE — 91300 PR COVID VAC PFIZER DIL RECON 30 MCG/0.3 ML IM: CPT

## 2021-03-20 PROCEDURE — 0001A PR COVID VAC PFIZER DIL RECON 30 MCG/0.3 ML IM: CPT

## 2021-03-23 DIAGNOSIS — G70.00 MYASTHENIA GRAVIS WITHOUT EXACERBATION (H): ICD-10-CM

## 2021-04-10 ENCOUNTER — IMMUNIZATION (OUTPATIENT)
Dept: NURSING | Facility: CLINIC | Age: 61
End: 2021-04-10
Attending: PEDIATRICS
Payer: COMMERCIAL

## 2021-04-10 PROCEDURE — 0002A PR COVID VAC PFIZER DIL RECON 30 MCG/0.3 ML IM: CPT

## 2021-04-10 PROCEDURE — 91300 PR COVID VAC PFIZER DIL RECON 30 MCG/0.3 ML IM: CPT

## 2021-04-11 DIAGNOSIS — G70.00 MYASTHENIA GRAVIS WITHOUT EXACERBATION (H): ICD-10-CM

## 2021-04-13 RX ORDER — MYCOPHENOLATE MOFETIL 500 MG/1
TABLET ORAL
Qty: 150 TABLET | Refills: 0 | Status: SHIPPED | OUTPATIENT
Start: 2021-04-13 | End: 2021-05-26

## 2021-05-26 DIAGNOSIS — G70.00 MYASTHENIA GRAVIS WITHOUT EXACERBATION (H): ICD-10-CM

## 2021-05-26 RX ORDER — MYCOPHENOLATE MOFETIL 500 MG/1
TABLET ORAL
Qty: 120 TABLET | Refills: 2 | Status: SHIPPED | OUTPATIENT
Start: 2021-05-26 | End: 2021-08-23

## 2021-06-07 ENCOUNTER — TELEPHONE (OUTPATIENT)
Dept: NEUROLOGY | Facility: CLINIC | Age: 61
End: 2021-06-07

## 2021-06-07 NOTE — TELEPHONE ENCOUNTER
Lab order faxed to [facility] (fax [phone]).  Called and notified patient.    Carolyne Kirk RN

## 2021-06-07 NOTE — TELEPHONE ENCOUNTER
What is the concern that needs to be addressed by a nurse? Pt would like labs ordered before their appt on 6/22/21. Please order and fax to primary clinic: [facility] F: [phone].     May a detailed message be left on voicemail? yes    Date of last office visit: 3/16/21    Message routed to: mincep rn pool

## 2021-06-11 ENCOUNTER — TRANSFERRED RECORDS (OUTPATIENT)
Dept: HEALTH INFORMATION MANAGEMENT | Facility: CLINIC | Age: 61
End: 2021-06-11

## 2021-06-22 ENCOUNTER — OFFICE VISIT (OUTPATIENT)
Dept: NEUROLOGY | Facility: CLINIC | Age: 61
End: 2021-06-22
Payer: COMMERCIAL

## 2021-06-22 VITALS
WEIGHT: 135 LBS | SYSTOLIC BLOOD PRESSURE: 142 MMHG | BODY MASS INDEX: 20.94 KG/M2 | TEMPERATURE: 97.5 F | DIASTOLIC BLOOD PRESSURE: 75 MMHG | HEART RATE: 60 BPM

## 2021-06-22 DIAGNOSIS — G70.00 MYASTHENIA GRAVIS WITHOUT EXACERBATION (H): Primary | ICD-10-CM

## 2021-06-22 NOTE — LETTER
6/22/2021       RE: David Vera  [address] [place] [address]     Dear Colleague,    Thank you for referring your patient, David Vera, to the [facility] at [facility]. Please see a copy of my visit note below.    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 In August 2017 he developed drooping of the left eyelid. In August he developed diplopia. In 11/17 he developed limb weakness. No shortness of breath. In early 11/17 he started mestinon, which helped ptosis. In mid 11/17 he started prednisone: 10 mg daily and then increasing to 20 mg daily after about a week. After prednisone was started he reported less eyelid ptosis and overall improved strength - but not normal. He increased to 40 mg in early 2018. In 12/17 he also started mycophenolate. Prednisone was slowly tapered to 20 mg daily in 7/2018. By 9/1/18 he was at prednisone 10 mg daily. In 11/18 he developed some recurrence of diplopia and so we held at 10 mg for a couple months. Diplopia resolved. In the spring 2019 he reduced prednisone to 7.5 mg daily and developed diplopia. In 6/19 predniosne was increased back to 10 mg daily and mycophenolate to 2500 mg daily. In 12/19 he reduced pred to 10 alternating with 7.5 and in late 1/20 he reduced to 7.5 mg daily. In 3/20 reduced to 5 mg daily. Through 2020 he continued to slowly taper, by 12/20 he was down to prednisone 2.5 mg every other day, and later that month he stopped prednisone altogether. In 2/21 WBC counted noted to be 2.9. In 3/21 he reduced MMF to 1000 mg BID. WBC later in 3/21 4.2, and then in 6/21 it was 3.2.    Interval history:  I last saw him in clinic 3/16/21. He has done well since his last visit. No relapses or slow deteriorations.  He continues continue mycophenolate 1000/1000 mg. He reduced this from 1500/1000 after his last visit. Over  the last few months he reports no diplopia, ptosis, dysarthria, dysphagia, shortness of breath or limb weakness. He continues to exercise frequently.  No physical limitations.     Prior pertinent laboratory work-up:  : AChR binding ab 0.7 nmol/L  (N < 0.4). TSH normal.   19: Normal CBC  3/11/2020 Normal CBC  : WBC 3.8  20: WBC 4.6  21: WBC 2.9  3/19/21: WBC 4.2.   21: WBC 3.2. Neutrophil 1.6 (Normal 1.7-7).       Prior imagin/17: CT chest showed no evidence of thymoma. No mediastinal mass or lymphadenopathy.    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 Decrement was about 40% at the facial-nasalis.      Past Medical History:   Myasthenia gravis    Past Surgical History:  Sinus surgery    Family history:    There is no known family history of myopathy or other neuromuscular disorders.    Social History:    He denies tobacco, alcohol, or illicit drug use.     Medical Allergies:  NKDA     Current Medications:    Current Outpatient Medications   Medication     mycophenolate (GENERIC EQUIVALENT) 500 MG tablet     CELLCEPT (BRAND) 500 MG TABLET     predniSONE (DELTASONE) 5 MG tablet     pyridostigmine (MESTINON) 60 MG tablet     No current facility-administered medications for this visit.      Review of Systems: A review of systems was obtained and was negative except for what was noted above.    Physical examination:    BP (!) 142/75   Pulse 60   Temp 97.5  F (36.4  C) (Temporal)   Wt 61.2 kg (135 lb)   BMI 20.94 kg/m      General Appearance: NAD    Skin: There are no rashes or other skin lesions.     Musculoskeletal:  No scoliosis, lordosis, kyphosis, pes cavus, or hammertoes.     Neurologic examination:     Mental status:  Patient is alert, attentive, and oriented x 3.  Language is coherent and fluent without aphasia.  Memory, comprehension and ability to follow  commands were intact.        Cranial nerves: No ptosis or diplopia at baseline, even after 60 seconds of upgaze. Eye closure strength is full. No dysarthria. Face and tongue strong.      Motor exam: No atrophy or fasciculations.   Manual muscle testing revealed the following MRC grade muscle power:       Right Left   Neck flexion 5     Neck extension: 5     Shoulder abduction:        5 5   Elbow extension: 5 5   Elbow flexion:            5 5   Wrist flexion:            5 5   Wrist extension:        5 5   FDI 5 5   Hip flexion 5 5   Knee extension 5 5   Dorsiflexion 5 5   Plantar flexion 5 5      Complex motor skills: No tremor or ataxia     Sensory exam: Normal vibration and pin in hands and feet     Gait narrow and stable.     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 He is in pharmacologic remission on MMF monotherapy. Because of low WBC count MMF optimization weaning began in 3/21. Reduction of MMFG by faster than 500 mg q 6 months increases his relapse risk, and so will try to wait another 6 months before reducing further. If his WBC count continues to fall then we may need to accelerate this timeline and find a different MG management approach.       Plan:      1. Mestinon: No longer needed. Stopped 12/20.   2. Prednisone: No longer needed. Stopped 12/20.   3. Mycophenolate: Continue 1000 mg BID for now. Last dose reduction 3/21.   4. Monitoring labs: WBC last checked about 10 days remains low, but not worsening.  Will repeat in a month.   5. If he develops inability to swallow of shortness of breath he should seek urgent medical attention and then let me know. If he develops worsening ocular symptoms, dysarthria, or limb weakness he should let me know ASAP.   6. Follow up in 3 months. Sooner if needed.   -----    Again, thank you for allowing me to participate in the care of your patient.      Sincerely,    Go Jewell MD

## 2021-06-22 NOTE — PROGRESS NOTES
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 In August 2017 he developed drooping of the left eyelid. In August he developed diplopia. In 11/17 he developed limb weakness. No shortness of breath. In early 11/17 he started mestinon, which helped ptosis. In mid 11/17 he started prednisone: 10 mg daily and then increasing to 20 mg daily after about a week. After prednisone was started he reported less eyelid ptosis and overall improved strength - but not normal. He increased to 40 mg in early 2018. In 12/17 he also started mycophenolate. Prednisone was slowly tapered to 20 mg daily in 7/2018. By 9/1/18 he was at prednisone 10 mg daily. In 11/18 he developed some recurrence of diplopia and so we held at 10 mg for a couple months. Diplopia resolved. In the spring 2019 he reduced prednisone to 7.5 mg daily and developed diplopia. In 6/19 predniosne was increased back to 10 mg daily and mycophenolate to 2500 mg daily. In 12/19 he reduced pred to 10 alternating with 7.5 and in late 1/20 he reduced to 7.5 mg daily. In 3/20 reduced to 5 mg daily. Through 2020 he continued to slowly taper, by 12/20 he was down to prednisone 2.5 mg every other day, and later that month he stopped prednisone altogether. In 2/21 WBC counted noted to be 2.9. In 3/21 he reduced MMF to 1000 mg BID. WBC later in 3/21 4.2, and then in 6/21 it was 3.2.    Interval history:  I last saw him in clinic 3/16/21. He has done well since his last visit. No relapses or slow deteriorations.  He continues continue mycophenolate 1000/1000 mg. He reduced this from 1500/1000 after his last visit. Over the last few months he reports no diplopia, ptosis, dysarthria, dysphagia, shortness of breath or limb weakness. He continues to exercise frequently.  No physical limitations.     Prior pertinent laboratory work-up:  11/17: AChR binding ab 0.7 nmol/L  (N < 0.4). TSH normal.   9/19/19: Normal  CBC  3/11/2020 Normal CBC  : WBC 3.8  20: WBC 4.6  21: WBC 2.9  3/19/21: WBC 4.2.   21: WBC 3.2. Neutrophil 1.6 (Normal 1.7-7).     Prior imagin/17: CT chest showed no evidence of thymoma. No mediastinal mass or lymphadenopathy.    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 Decrement was about 40% at the facial-nasalis.      Past Medical History:   Myasthenia gravis    Past Surgical History:  Sinus surgery    Family history:    There is no known family history of myopathy or other neuromuscular disorders.    Social History:    He denies tobacco, alcohol, or illicit drug use.     Medical Allergies:  NKDA     Current Medications:    Current Outpatient Medications   Medication     mycophenolate (GENERIC EQUIVALENT) 500 MG tablet     CELLCEPT (BRAND) 500 MG TABLET     predniSONE (DELTASONE) 5 MG tablet     pyridostigmine (MESTINON) 60 MG tablet     No current facility-administered medications for this visit.      Review of Systems: A review of systems was obtained and was negative except for what was noted above.    Physical examination:    BP (!) 142/75   Pulse 60   Temp 97.5  F (36.4  C) (Temporal)   Wt 61.2 kg (135 lb)   BMI 20.94 kg/m      General Appearance: NAD    Skin: There are no rashes or other skin lesions.     Musculoskeletal:  No scoliosis, lordosis, kyphosis, pes cavus, or hammertoes.     Neurologic examination:     Mental status:  Patient is alert, attentive, and oriented x 3.  Language is coherent and fluent without aphasia.  Memory, comprehension and ability to follow commands were intact.        Cranial nerves: No ptosis or diplopia at baseline, even after 60 seconds of upgaze. Eye closure strength is full. No dysarthria. Face and tongue strong.      Motor exam: No atrophy or fasciculations.   Manual muscle testing revealed the following MRC grade muscle  power:       Right Left   Neck flexion 5     Neck extension: 5     Shoulder abduction:        5 5   Elbow extension: 5 5   Elbow flexion:            5 5   Wrist flexion:            5 5   Wrist extension:        5 5   FDI 5 5   Hip flexion 5 5   Knee extension 5 5   Dorsiflexion 5 5   Plantar flexion 5 5      Complex motor skills: No tremor or ataxia     Sensory exam: Normal vibration and pin in hands and feet     Gait narrow and stable.     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 He is in pharmacologic remission on MMF monotherapy. Because of low WBC count MMF optimization weaning began in 3/21. Reduction of MMF by faster than 500 mg q 6 months increases his relapse risk, and so will try to wait another 6 months before reducing further. If his WBC count continues to fall then we may need to accelerate this timeline and find a different MG management approach.     Plan:      1. Mestinon: No longer needed. Stopped 12/20.   2. Prednisone: No longer needed. Stopped 12/20.   3. Mycophenolate: Continue 1000 mg BID for now. Last dose reduction 3/21.   4. Monitoring labs: WBC last checked about 10 days remains low, but not worsening. Will repeat in a month.   5. If he develops inability to swallow of shortness of breath he should seek urgent medical attention and then let me know. If he develops worsening ocular symptoms, dysarthria, or limb weakness he should let me know ASAP.   6. Follow up in 3 months. Sooner if needed.    -----  7/1/21: Note from David. He reports left sided ptosis has returns. He sent me a picture showing the same. Ptosis is mild but present. I spoke with him 7/2/21. He reports ptosis had improved on that day. No other MG symptoms. We agreed to keep an eye on it for now. If it worsens or new symptoms develop then we may need to add a small dose of prednisone or restart mestinon. Considering CBC increasing MMF is not attractive. He is in agreement with the plan.

## 2021-08-19 NOTE — TELEPHONE ENCOUNTER
Pt has 4 days of meds left, please send to rx. Pt also would like to request 90 days instead of 30 days.    · Follows with cardiology at [facility]  · Continue metoprolol 25mg daily   · Only on daily ASA 81mg - held in setting of anemia

## 2021-08-23 DIAGNOSIS — G70.00 MYASTHENIA GRAVIS WITHOUT EXACERBATION (H): ICD-10-CM

## 2021-08-23 RX ORDER — MYCOPHENOLATE MOFETIL 500 MG/1
TABLET ORAL
Qty: 120 TABLET | Refills: 1 | Status: SHIPPED | OUTPATIENT
Start: 2021-08-23 | End: 2021-10-19

## 2021-09-03 ENCOUNTER — IMMUNIZATION (OUTPATIENT)
Dept: NURSING | Facility: CLINIC | Age: 61
End: 2021-09-03
Payer: COMMERCIAL

## 2021-09-03 PROCEDURE — 0003A PR COVID VAC PFIZER DIL RECON 30 MCG/0.3 ML IM: CPT

## 2021-09-03 PROCEDURE — 91300 PR COVID VAC PFIZER DIL RECON 30 MCG/0.3 ML IM: CPT

## 2021-09-14 ENCOUNTER — OFFICE VISIT (OUTPATIENT)
Dept: NEUROLOGY | Facility: CLINIC | Age: 61
End: 2021-09-14
Payer: COMMERCIAL

## 2021-09-14 VITALS
WEIGHT: 135 LBS | DIASTOLIC BLOOD PRESSURE: 75 MMHG | HEART RATE: 69 BPM | SYSTOLIC BLOOD PRESSURE: 118 MMHG | TEMPERATURE: 97.5 F | BODY MASS INDEX: 20.94 KG/M2

## 2021-09-14 DIAGNOSIS — G70.00 MYASTHENIA GRAVIS WITHOUT EXACERBATION (H): Primary | ICD-10-CM

## 2021-09-14 NOTE — LETTER
9/14/2021     RE: David Vera  [address] [place] [address]     Dear Colleague,    Thank you for referring your patient, David Vera, to the [facility] at [facility]. Please see a copy of my visit note below.    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 In August 2017 he developed drooping of the left eyelid. In August he developed diplopia. In 11/17 he developed limb weakness. No shortness of breath. In early 11/17 he started mestinon, which helped ptosis. In mid 11/17 he started prednisone: 10 mg daily and then increasing to 20 mg daily after about a week. After prednisone was started he reported less eyelid ptosis and overall improved strength - but not normal. He increased to 40 mg in early 2018. In 12/17 he also started mycophenolate. Prednisone was slowly tapered to 20 mg daily in 7/2018. By 9/1/18 he was at prednisone 10 mg daily. In 11/18 he developed some recurrence of diplopia and so we held at 10 mg for a couple months. Diplopia resolved. In the spring 2019 he reduced prednisone to 7.5 mg daily and developed diplopia. In 6/19 predniosne was increased back to 10 mg daily and mycophenolate to 2500 mg daily. In 12/19 he reduced pred to 10 alternating with 7.5 and in late 1/20 he reduced to 7.5 mg daily. In 3/20 reduced to 5 mg daily. Through 2020 he continued to slowly taper, by 12/20 he was down to prednisone 2.5 mg every other day, and later that month he stopped prednisone altogether. In 2/21 WBC counted noted to be 2.9. In 3/21 he reduced MMF to 1000 mg BID. WBC later in 3/21 4.2, and then in 6/21 it was 3.2.    Interval history:  I last saw him in clinic 6/22/21. He has done well since his last visit. He had one episode of unilateral ptosis in July that lasted for less than one day. Otherwise he reports no diplopia, ptosis, dysarthria, dysphagia,  shortness of breath or limb weakness. He continues to exercise frequently.  No physical limitations- he continues to run, bike, and lift weights. Last episode of diplopia was years ago. He continues continue mycophenolate 1000/1000 mg.     Prior pertinent laboratory work-up:  : AChR binding ab 0.7 nmol/L  (N < 0.4). TSH normal.   19: Normal CBC  3/11/2020 Normal CBC  : WBC 3.8  20: WBC 4.6  21: WBC 2.9  3/19/21: WBC 4.2.   21: WBC 3.2. Neutrophil 1.6 (Normal 1.7-7).   9/3/21: WBC 3.7, neutrophil 1.8     Prior imagin/17: CT chest showed no evidence of thymoma. No mediastinal mass or lymphadenopathy.    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 Decrement was about 40% at the facial-nasalis.      Past Medical History:   Myasthenia gravis    Past Surgical History:  Sinus surgery    Family history:    There is no known family history of myopathy or other neuromuscular disorders.    Social History:    He denies tobacco, alcohol, or illicit drug use.     Medical Allergies:  NKDA     Current Medications:    Current Outpatient Medications   Medication     mycophenolate (GENERIC EQUIVALENT) 500 MG tablet     CELLCEPT (BRAND) 500 MG TABLET     predniSONE (DELTASONE) 5 MG tablet     pyridostigmine (MESTINON) 60 MG tablet     No current facility-administered medications for this visit.     Review of Systems: A review of systems was obtained and was negative except for what was noted above.    Physical examination:    /75   Pulse 69   Temp 97.5  F (36.4  C) (Temporal)   Wt 61.2 kg (135 lb)   BMI 20.94 kg/m      General Appearance: NAD    Skin: There are no rashes or other skin lesions.     Musculoskeletal:  No scoliosis, lordosis, kyphosis, pes cavus, or hammertoes.     Neurologic examination:     Mental status:  Patient is alert, attentive, and oriented x 3.  Language  is coherent and fluent without aphasia.  Memory, comprehension and ability to follow commands were intact.        Cranial nerves: No ptosis or diplopia at baseline, even after 60 seconds of upgaze. Eye closure strength is full. No dysarthria. Face and tongue strong.      Motor exam: No atrophy or fasciculations.   Manual muscle testing revealed the following MRC grade muscle power:       Right Left   Neck flexion 5     Neck extension: 5     Shoulder abduction:        5 5   Elbow extension: 5 5   Elbow flexion:            5 5   Wrist flexion:            5 5   Wrist extension:        5 5   FDI 5 5   Hip flexion 5 5   Knee extension 5 5   Dorsiflexion 5 5   Plantar flexion 5 5      Complex motor skills: No tremor or ataxia     Sensory exam: Normal vibration and pin in hands and feet     Gait narrow and stable.     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 He is in pharmacologic remission on MMF monotherapy.  He exercises regularly and has no limitations from a myasthenia standpoint.  In the past he has had a low white blood cell count that has required careful reduction of mycophenolate.  He continues on 1000 mg twice daily and his last CBC 1 week ago shows some slow improvement in his leukocytes/neutrophils.  Reduction of MMF by faster than 500 mg q 6 months increases his relapse risk, and so will try to wait another 6 months before reducing further.  He will return in 3 months and we will get  another CBC at that time.    Plan:      1. Mestinon: No longer needed. Stopped 12/20.   2. Prednisone: No longer needed. Stopped 12/20.   3. Mycophenolate: Continue 1000 mg BID  4. Monitoring labs: WBC repeat in 3 months prior to next visit  5. If he develops inability to swallow of shortness of breath he should seek urgent medical attention and then let me know. If he develops worsening ocular symptoms, dysarthria, or limb weakness he should let me know ASAP.   6. Covid: He got his booster vaccine 1 week ago  7. Follow up in 3 months. Sooner if needed.     Rogers Infante MD  Neurophysiology Fellow    Patient was seen and discussed with Dr. Jewell. His addendum follows.     -----  ADDENDUM:  I personally interviewed and examined the patient. I agree with the history, physical, assessment, and plan as documented above. Changes to the physical examination, assessment and plan have been incorporated into the note by myself, as to make it a single cohesive document.    Go Jewell MD      Again, thank you for allowing me to participate in the care of your patient.      Sincerely,    Go Jewell MD

## 2021-09-14 NOTE — PROGRESS NOTES
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 In August 2017 he developed drooping of the left eyelid. In August he developed diplopia. In 11/17 he developed limb weakness. No shortness of breath. In early 11/17 he started mestinon, which helped ptosis. In mid 11/17 he started prednisone: 10 mg daily and then increasing to 20 mg daily after about a week. After prednisone was started he reported less eyelid ptosis and overall improved strength - but not normal. He increased to 40 mg in early 2018. In 12/17 he also started mycophenolate. Prednisone was slowly tapered to 20 mg daily in 7/2018. By 9/1/18 he was at prednisone 10 mg daily. In 11/18 he developed some recurrence of diplopia and so we held at 10 mg for a couple months. Diplopia resolved. In the spring 2019 he reduced prednisone to 7.5 mg daily and developed diplopia. In 6/19 predniosne was increased back to 10 mg daily and mycophenolate to 2500 mg daily. In 12/19 he reduced pred to 10 alternating with 7.5 and in late 1/20 he reduced to 7.5 mg daily. In 3/20 reduced to 5 mg daily. Through 2020 he continued to slowly taper, by 12/20 he was down to prednisone 2.5 mg every other day, and later that month he stopped prednisone altogether. In 2/21 WBC counted noted to be 2.9. In 3/21 he reduced MMF to 1000 mg BID. WBC later in 3/21 4.2, and then in 6/21 it was 3.2.    Interval history:  I last saw him in clinic 6/22/21. He has done well since his last visit. He had one episode of unilateral ptosis in July that lasted for less than one day. Otherwise he reports no diplopia, ptosis, dysarthria, dysphagia, shortness of breath or limb weakness. He continues to exercise frequently.  No physical limitations- he continues to run, bike, and lift weights. Last episode of diplopia was years ago. He continues continue mycophenolate 1000/1000 mg.     Prior pertinent laboratory work-up:  11/17: AChR  binding ab 0.7 nmol/L  (N < 0.4). TSH normal.   19: Normal CBC  3/11/2020 Normal CBC  : WBC 3.8  20: WBC 4.6  21: WBC 2.9  3/19/21: WBC 4.2.   21: WBC 3.2. Neutrophil 1.6 (Normal 1.7-7).   9/3/21: WBC 3.7, neutrophil 1.8     Prior imagin/17: CT chest showed no evidence of thymoma. No mediastinal mass or lymphadenopathy.    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 Decrement was about 40% at the facial-nasalis.      Past Medical History:   Myasthenia gravis    Past Surgical History:  Sinus surgery    Family history:    There is no known family history of myopathy or other neuromuscular disorders.    Social History:    He denies tobacco, alcohol, or illicit drug use.     Medical Allergies:  NKDA     Current Medications:    Current Outpatient Medications   Medication     mycophenolate (GENERIC EQUIVALENT) 500 MG tablet     CELLCEPT (BRAND) 500 MG TABLET     predniSONE (DELTASONE) 5 MG tablet     pyridostigmine (MESTINON) 60 MG tablet     No current facility-administered medications for this visit.     Review of Systems: A review of systems was obtained and was negative except for what was noted above.    Physical examination:    /75   Pulse 69   Temp 97.5  F (36.4  C) (Temporal)   Wt 61.2 kg (135 lb)   BMI 20.94 kg/m      General Appearance: NAD    Skin: There are no rashes or other skin lesions.     Musculoskeletal:  No scoliosis, lordosis, kyphosis, pes cavus, or hammertoes.     Neurologic examination:     Mental status:  Patient is alert, attentive, and oriented x 3.  Language is coherent and fluent without aphasia.  Memory, comprehension and ability to follow commands were intact.        Cranial nerves: No ptosis or diplopia at baseline, even after 60 seconds of upgaze. Eye closure strength is full. No dysarthria. Face and tongue strong.      Motor exam: No  atrophy or fasciculations.   Manual muscle testing revealed the following MRC grade muscle power:       Right Left   Neck flexion 5     Neck extension: 5     Shoulder abduction:        5 5   Elbow extension: 5 5   Elbow flexion:            5 5   Wrist flexion:            5 5   Wrist extension:        5 5   FDI 5 5   Hip flexion 5 5   Knee extension 5 5   Dorsiflexion 5 5   Plantar flexion 5 5      Complex motor skills: No tremor or ataxia     Sensory exam: Normal vibration and pin in hands and feet     Gait narrow and stable.     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 He is in pharmacologic remission on MMF monotherapy.  He exercises regularly and has no limitations from a myasthenia standpoint.  In the past he has had a low white blood cell count that has required careful reduction of mycophenolate.  He continues on 1000 mg twice daily and his last CBC 1 week ago shows some slow improvement in his leukocytes/neutrophils.  Reduction of MMF by faster than 500 mg q 6 months increases his relapse risk, and so will try to wait another 6 months before reducing further.  He will return in 3 months and we will get another CBC at that time.    Plan:      1. Mestinon: No longer needed. Stopped 12/20.   2. Prednisone: No longer needed. Stopped 12/20.   3. Mycophenolate: Continue 1000 mg BID  4. Monitoring labs: WBC repeat in 3 months prior to next visit  5. If he develops inability to swallow of  shortness of breath he should seek urgent medical attention and then let me know. If he develops worsening ocular symptoms, dysarthria, or limb weakness he should let me know ASAP.   6. Covid: He got his booster vaccine 1 week ago  7. Follow up in 3 months. Sooner if needed.     Rogers Infante MD  Neurophysiology Fellow    Patient was seen and discussed with Dr. Jewell. His addendum follows.     -----  ADDENDUM:  I personally interviewed and examined the patient. I agree with the history, physical, assessment, and plan as documented above. Changes to the physical examination, assessment and plan have been incorporated into the note by myself, as to make it a single cohesive document.    Go Jewell MD

## 2021-10-18 DIAGNOSIS — G70.00 MYASTHENIA GRAVIS WITHOUT EXACERBATION (H): ICD-10-CM

## 2021-10-19 RX ORDER — MYCOPHENOLATE MOFETIL 500 MG/1
TABLET ORAL
Qty: 120 TABLET | Refills: 2 | Status: SHIPPED | OUTPATIENT
Start: 2021-10-19 | End: 2022-01-17

## 2021-10-24 ENCOUNTER — HEALTH MAINTENANCE LETTER (OUTPATIENT)
Age: 61
End: 2021-10-24

## 2021-12-02 ENCOUNTER — LAB (OUTPATIENT)
Dept: NEUROLOGY | Facility: CLINIC | Age: 61
End: 2021-12-02
Payer: COMMERCIAL

## 2021-12-02 DIAGNOSIS — G70.00 MYASTHENIA GRAVIS WITHOUT EXACERBATION (H): ICD-10-CM

## 2021-12-02 LAB
ERYTHROCYTE [DISTWIDTH] IN BLOOD BY AUTOMATED COUNT: 13.2 % (ref 10–15)
HCT VFR BLD AUTO: 43.1 % (ref 40–53)
HGB BLD-MCNC: 13.6 G/DL (ref 13.3–17.7)
MCH RBC QN AUTO: 30.7 PG (ref 26.5–33)
MCHC RBC AUTO-ENTMCNC: 31.6 G/DL (ref 31.5–36.5)
MCV RBC AUTO: 97 FL (ref 78–100)
PLATELET # BLD AUTO: 210 10E3/UL (ref 150–450)
RBC # BLD AUTO: 4.43 10E6/UL (ref 4.4–5.9)
WBC # BLD AUTO: 4.3 10E3/UL (ref 4–11)

## 2021-12-02 PROCEDURE — 85027 COMPLETE CBC AUTOMATED: CPT | Performed by: PSYCHIATRY & NEUROLOGY

## 2021-12-07 ENCOUNTER — OFFICE VISIT (OUTPATIENT)
Dept: NEUROLOGY | Facility: CLINIC | Age: 61
End: 2021-12-07
Payer: COMMERCIAL

## 2021-12-07 VITALS
HEART RATE: 76 BPM | SYSTOLIC BLOOD PRESSURE: 120 MMHG | TEMPERATURE: 97.5 F | BODY MASS INDEX: 21.9 KG/M2 | DIASTOLIC BLOOD PRESSURE: 76 MMHG | WEIGHT: 141.2 LBS

## 2021-12-07 DIAGNOSIS — G70.00 MYASTHENIA GRAVIS WITHOUT EXACERBATION (H): Primary | ICD-10-CM

## 2021-12-07 NOTE — LETTER
12/7/2021     RE: David Vera  [address] [place] [address]     Dear Colleague,    Thank you for referring your patient, David Vera, to the [facility] NEUROSPECIALTIES at [facility]. Please see a copy of my visit note below.    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 In August 2017 he developed drooping of the left eyelid. In August he developed diplopia. In 11/17 he developed limb weakness. No shortness of breath. In early 11/17 he started mestinon, which helped ptosis. In mid 11/17 he started prednisone: 10 mg daily and then increasing to 20 mg daily after about a week. After prednisone was started he reported less eyelid ptosis and overall improved strength - but not normal. He increased to 40 mg in early 2018. In 12/17 he also started mycophenolate. Prednisone was slowly tapered to 20 mg daily in 7/2018. By 9/1/18 he was at prednisone 10 mg daily. In 11/18 he developed some recurrence of diplopia and so we held at 10 mg for a couple months. Diplopia resolved. In the spring 2019 he reduced prednisone to 7.5 mg daily and developed diplopia. In 6/19 predniosne was increased back to 10 mg daily and mycophenolate to 2500 mg daily. In 12/19 he reduced pred to 10 alternating with 7.5 and in late 1/20 he reduced to 7.5 mg daily. In 3/20 reduced to 5 mg daily. Through 2020 he continued to slowly taper, by 12/20 he was down to prednisone 2.5 mg every other day, and later that month he stopped prednisone altogether. In 2/21 WBC counted noted to be 2.9. In 3/21 he reduced MMF to 1000 mg BID. WBC later in 3/21 4.2, in 6/21 it was 3.2 and 12/21 was 4.3.    Interval history:  I last saw him in clinic 9/14/21. He remains on MMF 1000 mg BID, He has done excellent. No relapses or slow deteriorations. He reports no diplopia, ptosis, dysarthria, dysphagia, shortness of breath or limb  weakness. He continues to exercise frequently.  He is active: ski, run, bike, and lift weights. WBC count was also recently checked an is improved.     Prior pertinent laboratory work-up:  : AChR binding ab 0.7 nmol/L  (N < 0.4). TSH normal.   19: Normal CBC  3/11/2020 Normal CBC  : WBC 3.8  20: WBC 4.6  21: WBC 2.9  3/19/21: WBC 4.2.   21: WBC 3.2. Neutrophil 1.6 (Normal 1.7-7).   9/3/21: WBC 3.7, neutrophil 1.8     Prior imagin/17: CT chest showed no evidence of thymoma. No mediastinal mass or lymphadenopathy.    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 Decrement was about 40% at the facial-nasalis.      Past Medical History:   Myasthenia gravis    Past Surgical History:  Sinus surgery    Family history:    There is no known family history of myopathy or other neuromuscular disorders.    Social History:    He denies tobacco, alcohol, or illicit drug use.     Medical Allergies:  NKDA     Current Medications:    Current Outpatient Medications   Medication     mycophenolate (GENERIC EQUIVALENT) 500 MG tablet     CELLCEPT (BRAND) 500 MG TABLET     predniSONE (DELTASONE) 5 MG tablet     pyridostigmine (MESTINON) 60 MG tablet     No current facility-administered medications for this visit.     Review of Systems: A review of systems was obtained and was negative except for what was noted above.    Physical examination:    /76   Pulse 76   Temp 97.5  F (36.4  C) (Temporal)   Wt 64 kg (141 lb 3.2 oz)   BMI 21.90 kg/m      General Appearance: NAD    Skin: There are no rashes or other skin lesions.     Neurologic examination:     Mental status:  Patient is alert, attentive, and oriented x 3.  Language is coherent and fluent without aphasia.  Memory, comprehension and ability to follow commands were intact.        Cranial nerves: No ptosis or diplopia at baseline,  even after 60 seconds of upgaze. Eye closure strength is full. No dysarthria. Face strong and symmetric.      Motor exam: No atrophy or fasciculations.   Manual muscle testing revealed the following MRC grade muscle power:       Right Left   Neck flexion 5     Neck extension: 5     Shoulder abduction:        5 5   Elbow extension: 5 5   Elbow flexion:            5 5   Wrist flexion:            5 5   Wrist extension:        5 5   FDI 5 5   Hip flexion 5 5   Knee extension 5 5   Dorsiflexion 5 5   Plantar flexion 5 5      Complex motor skills: No tremor or ataxia     Sensory exam: Normal LT and pin in hands and feet     Gait narrow and stable.     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 He remains in pharmacologic remission on MMF monotherapy.  At some point may reduce MMF again (last reduction 3/21), but considering his disease stability and improved WBC count will make no changes today. All questions answerered. He agrees with the plan below.     Plan:      1. Mestinon: No longer needed. Stopped 12/20.   2. Prednisone: No longer needed. Stopped 12/20.   3. Mycophenolate: Continue 1000 mg BID  4. Monitoring labs: WBC improved. Will repeat at next visit  5. If he develops inability to swallow of shortness of breath he should seek urgent medical attention and then let me know. If he develops worsening ocular symptoms, dysarthria, or limb weakness he should let me  know ASAP.   6. Covid: He got his booster vaccine 1 week ago  7. Follow up in 6 months. Sooner if needed.   -----  Again, thank you for allowing me to participate in the care of your patient.      Sincerely,    Go Jewell MD

## 2021-12-07 NOTE — PROGRESS NOTES
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 In August 2017 he developed drooping of the left eyelid. In August he developed diplopia. In 11/17 he developed limb weakness. No shortness of breath. In early 11/17 he started mestinon, which helped ptosis. In mid 11/17 he started prednisone: 10 mg daily and then increasing to 20 mg daily after about a week. After prednisone was started he reported less eyelid ptosis and overall improved strength - but not normal. He increased to 40 mg in early 2018. In 12/17 he also started mycophenolate. Prednisone was slowly tapered to 20 mg daily in 7/2018. By 9/1/18 he was at prednisone 10 mg daily. In 11/18 he developed some recurrence of diplopia and so we held at 10 mg for a couple months. Diplopia resolved. In the spring 2019 he reduced prednisone to 7.5 mg daily and developed diplopia. In 6/19 predniosne was increased back to 10 mg daily and mycophenolate to 2500 mg daily. In 12/19 he reduced pred to 10 alternating with 7.5 and in late 1/20 he reduced to 7.5 mg daily. In 3/20 reduced to 5 mg daily. Through 2020 he continued to slowly taper, by 12/20 he was down to prednisone 2.5 mg every other day, and later that month he stopped prednisone altogether. In 2/21 WBC counted noted to be 2.9. In 3/21 he reduced MMF to 1000 mg BID. WBC later in 3/21 4.2, in 6/21 it was 3.2 and 12/21 was 4.3.    Interval history:  I last saw him in clinic 9/14/21. He remains on MMF 1000 mg BID, He has done excellent. No relapses or slow deteriorations. He reports no diplopia, ptosis, dysarthria, dysphagia, shortness of breath or limb weakness. He continues to exercise frequently.  He is active: ski, run, bike, and lift weights. WBC count was also recently checked an is improved.     Prior pertinent laboratory work-up:  11/17: AChR binding ab 0.7 nmol/L  (N < 0.4). TSH normal.   9/19/19: Normal CBC  3/11/2020 Normal  CBC  : WBC 3.8  20: WBC 4.6  21: WBC 2.9  3/19/21: WBC 4.2.   21: WBC 3.2. Neutrophil 1.6 (Normal 1.7-7).   9/3/21: WBC 3.7, neutrophil 1.8     Prior imagin/17: CT chest showed no evidence of thymoma. No mediastinal mass or lymphadenopathy.    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 Decrement was about 40% at the facial-nasalis.      Past Medical History:   Myasthenia gravis    Past Surgical History:  Sinus surgery    Family history:    There is no known family history of myopathy or other neuromuscular disorders.    Social History:    He denies tobacco, alcohol, or illicit drug use.     Medical Allergies:  NKDA     Current Medications:    Current Outpatient Medications   Medication     mycophenolate (GENERIC EQUIVALENT) 500 MG tablet     CELLCEPT (BRAND) 500 MG TABLET     predniSONE (DELTASONE) 5 MG tablet     pyridostigmine (MESTINON) 60 MG tablet     No current facility-administered medications for this visit.     Review of Systems: A review of systems was obtained and was negative except for what was noted above.    Physical examination:    /76   Pulse 76   Temp 97.5  F (36.4  C) (Temporal)   Wt 64 kg (141 lb 3.2 oz)   BMI 21.90 kg/m      General Appearance: NAD    Skin: There are no rashes or other skin lesions.     Neurologic examination:     Mental status:  Patient is alert, attentive, and oriented x 3.  Language is coherent and fluent without aphasia.  Memory, comprehension and ability to follow commands were intact.        Cranial nerves: No ptosis or diplopia at baseline, even after 60 seconds of upgaze. Eye closure strength is full. No dysarthria. Face strong and symmetric.      Motor exam: No atrophy or fasciculations.   Manual muscle testing revealed the following MRC grade muscle power:       Right Left   Neck flexion 5     Neck extension: 5      Shoulder abduction:        5 5   Elbow extension: 5 5   Elbow flexion:            5 5   Wrist flexion:            5 5   Wrist extension:        5 5   FDI 5 5   Hip flexion 5 5   Knee extension 5 5   Dorsiflexion 5 5   Plantar flexion 5 5      Complex motor skills: No tremor or ataxia     Sensory exam: Normal LT and pin in hands and feet     Gait narrow and stable.     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 He remains in pharmacologic remission on MMF monotherapy.  At some point may reduce MMF again (last reduction 3/21), but considering his disease stability and improved WBC count will make no changes today. All questions answerered. He agrees with the plan below.     Plan:      1. Mestinon: No longer needed. Stopped 12/20.   2. Prednisone: No longer needed. Stopped 12/20.   3. Mycophenolate: Continue 1000 mg BID  4. Monitoring labs: WBC improved. Will repeat at next visit  5. If he develops inability to swallow of shortness of breath he should seek urgent medical attention and then let me know. If he develops worsening ocular symptoms, dysarthria, or limb weakness he should let me know ASAP.   6. Covid: He got his booster vaccine 1 week ago  7. Follow up in 6 months. Sooner if needed.       -----

## 2022-01-16 DIAGNOSIS — G70.00 MYASTHENIA GRAVIS WITHOUT EXACERBATION (H): ICD-10-CM

## 2022-01-17 RX ORDER — MYCOPHENOLATE MOFETIL 500 MG/1
TABLET ORAL
Qty: 120 TABLET | Refills: 2 | Status: SHIPPED | OUTPATIENT
Start: 2022-01-17 | End: 2022-04-18

## 2022-02-13 ENCOUNTER — HEALTH MAINTENANCE LETTER (OUTPATIENT)
Age: 62
End: 2022-02-13

## 2022-04-05 ENCOUNTER — IMMUNIZATION (OUTPATIENT)
Dept: NURSING | Facility: CLINIC | Age: 62
End: 2022-04-05
Payer: COMMERCIAL

## 2022-04-05 PROCEDURE — 0054A COVID-19,PF,PFIZER (12+ YRS): CPT

## 2022-04-05 PROCEDURE — 91305 COVID-19,PF,PFIZER (12+ YRS): CPT

## 2022-04-18 DIAGNOSIS — G70.00 MYASTHENIA GRAVIS WITHOUT EXACERBATION (H): ICD-10-CM

## 2022-04-18 RX ORDER — MYCOPHENOLATE MOFETIL 500 MG/1
TABLET ORAL
Qty: 120 TABLET | Refills: 1 | Status: SHIPPED | OUTPATIENT
Start: 2022-04-18 | End: 2022-06-24

## 2022-05-09 NOTE — PROGRESS NOTES
Rita, MD James Urban Tracey RN                   Tell pt that if he wants, he can slowly increase to two pills three times per day. jf            Previous Messages       ----- Message -----      From: Kamila Ramirez RN      Sent: 11/14/2017   1:14 PM        To: Ney Salinas MD   Subject: FYI                                               Please see my chart message below.     Thanks!   T   =============================     Dr Salinas after taking two pills of the medication (MESTINO) at the same time I noticed a much better improvement on my vision/eyes.     I was taking one pill each time for a total of four (4) pills allowing about six hours or less per pills. On Monday night (11/13) I took 2 pills at once and it really improved my vision/eye significant.     My current dosage is a total of 4 pills per day.                              11/14/17:  My chart message sent back to patient with Dr. Salinas's suggestion of increasing the Mestinon to two tablets three times daily.   (4) walks frequently

## 2022-06-07 ENCOUNTER — OFFICE VISIT (OUTPATIENT)
Dept: NEUROLOGY | Facility: CLINIC | Age: 62
End: 2022-06-07
Payer: COMMERCIAL

## 2022-06-07 VITALS
DIASTOLIC BLOOD PRESSURE: 74 MMHG | TEMPERATURE: 97.3 F | BODY MASS INDEX: 21.6 KG/M2 | HEIGHT: 67 IN | SYSTOLIC BLOOD PRESSURE: 119 MMHG | HEART RATE: 77 BPM | WEIGHT: 137.6 LBS

## 2022-06-07 DIAGNOSIS — G70.00 MYASTHENIA GRAVIS WITHOUT EXACERBATION (H): Primary | ICD-10-CM

## 2022-06-07 LAB
ERYTHROCYTE [DISTWIDTH] IN BLOOD BY AUTOMATED COUNT: 13.3 % (ref 10–15)
HCT VFR BLD AUTO: 42.7 % (ref 40–53)
HGB BLD-MCNC: 13.4 G/DL (ref 13.3–17.7)
MCH RBC QN AUTO: 29.9 PG (ref 26.5–33)
MCHC RBC AUTO-ENTMCNC: 31.4 G/DL (ref 31.5–36.5)
MCV RBC AUTO: 95 FL (ref 78–100)
PLATELET # BLD AUTO: 235 10E3/UL (ref 150–450)
RBC # BLD AUTO: 4.48 10E6/UL (ref 4.4–5.9)
WBC # BLD AUTO: 5.1 10E3/UL (ref 4–11)

## 2022-06-07 PROCEDURE — 85027 COMPLETE CBC AUTOMATED: CPT | Performed by: PSYCHIATRY & NEUROLOGY

## 2022-06-07 NOTE — LETTER
6/7/2022     RE: David Vera  [address] [place] [address]     Dear Colleague,    Thank you for referring your patient, David Vera, to the [facility] NEUROSPECIALTIES at [facility]. Please see a copy of my visit note below.    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 In August 2017 he developed drooping of the left eyelid. In August he developed diplopia. In 11/17 he developed limb weakness. No shortness of breath. In early 11/17 he started mestinon, which helped ptosis. In mid 11/17 he started prednisone: 10 mg daily and then increasing to 20 mg daily after about a week. After prednisone was started he reported less eyelid ptosis and overall improved strength - but not normal. He increased to 40 mg in early 2018. In 12/17 he also started mycophenolate. Prednisone was slowly tapered to 20 mg daily in 7/2018. By 9/1/18 he was at prednisone 10 mg daily. In 11/18 he developed some recurrence of diplopia and so we held at 10 mg for a couple months. Diplopia resolved. In the spring 2019 he reduced prednisone to 7.5 mg daily and developed diplopia. In 6/19 predniosne was increased back to 10 mg daily and mycophenolate to 2500 mg daily. In 12/19 he reduced pred to 10 alternating with 7.5 and in late 1/20 he reduced to 7.5 mg daily. In 3/20 reduced to 5 mg daily. Through 2020 he continued to slowly taper, by 12/20 he was down to prednisone 2.5 mg every other day, and later that month he stopped prednisone altogether. In 2/21 WBC counted noted to be 2.9. In 3/21 he reduced MMF to 1000 mg BID. WBC later in 3/21 4.2, in 6/21 it was 3.2 and 12/21 was 4.3.    Interval history:  I last saw him in clinic 12/7/21. He has been feeling well since then and has no concerns. He continues on MMF 1000 BID. He has not had any dysphagia, diplopia, ptosis, shortness of breath at rest or with  "exertion. He continues to bike 20 miles at a time and runs 5 miles. He lifts weights for 1 hour or more. He has no concerns regarding myasthenia.     Prior pertinent laboratory work-up:  : AChR binding ab 0.7 nmol/L  (N < 0.4). TSH normal.   19: Normal CBC  3/11/2020 Normal CBC  : WBC 3.8  20: WBC 4.6  21: WBC 2.9  3/19/21: WBC 4.2.   21: WBC 3.2. Neutrophil 1.6 (Normal 1.7-7).   9/3/21: WBC 3.7, neutrophil 1.8   : Normal CBC    Prior imagin/17: CT chest showed no evidence of thymoma. No mediastinal mass or lymphadenopathy.    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 Decrement was about 40% at the facial-nasalis.      Past Medical History:   Myasthenia gravis    Past Surgical History:  Sinus surgery    Family history:    There is no known family history of myopathy or other neuromuscular disorders.    Social History:    He denies tobacco, alcohol, or illicit drug use.     Medical Allergies:  NKDA     Current Medications:    Current Outpatient Medications   Medication     mycophenolate (GENERIC EQUIVALENT) 500 MG tablet     CELLCEPT (BRAND) 500 MG TABLET     predniSONE (DELTASONE) 5 MG tablet     pyridostigmine (MESTINON) 60 MG tablet     No current facility-administered medications for this visit.     Review of Systems: A review of systems was obtained and was negative except for what was noted above.    Physical examination:    /74   Pulse 77   Temp 97.3  F (36.3  C) (Temporal)   Ht 1.702 m (5' 7\")   Wt 62.4 kg (137 lb 9.6 oz)   BMI 21.55 kg/m      General Appearance: NAD    Skin: There are no rashes or other skin lesions.     Neurologic examination:     Mental status:  Patient is alert, attentive, and oriented x 3.  Language is coherent and fluent without aphasia.  Memory, comprehension and ability to follow commands were intact.        Cranial " nerves: No ptosis or diplopia at baseline, even after 60 seconds of upgaze and lateral gaze. Eye closure strength is full. No dysarthria. Face strong and symmetric.      Motor exam: No atrophy or fasciculations.   Manual muscle testing revealed the following MRC grade muscle power:       Right Left   Neck flexion 5     Neck extension: 5     Shoulder abduction:        5 5   Elbow extension: 5 5   Elbow flexion:            5 5   Wrist flexion:            5 5   Wrist extension:        5 5   FDI 5 5   Hip flexion 5 5   Knee extension 5 5   Dorsiflexion 5 5   Plantar flexion 5 5      Complex motor skills: No tremor or ataxia     Sensory exam: Normal LT and pin in hands and feet     Gait narrow and stable.     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 He remains in pharmacologic remission on MMF monotherapy. Considering his outstanding MG control over the last 2 years will begin mycophenolate optimization. Literature suggests that likelihood for MMF weaning success improves if weaning is by no slower than 500 mg every 6 months. The mild leukopenia that had been observed in 2021 has improved and would be expected to continue to improve on a lower mycophenolate dose.  All questions answerered. He agrees with the plan below.     Plan:      1. Mestinon: No longer needed. Stopped 12/20.   2. Prednisone: No longer needed. Stopped 12/20.   3.  Mycophenolate: Reduce from 1000 mg BID to 500 mg in the morning at 1000mg at night  4. Monitoring labs: CBC today  5. If he develops inability to swallow of shortness of breath he should seek urgent medical attention and then let me know. If he develops worsening ocular symptoms, dysarthria, or limb weakness he should let me know ASAP.   6. Covid: Received his booster vaccine 12/2021  7. Follow up in 6 months. Sooner if needed.     Rogers Infante MD  Neurophysiology Fellow     This patient was seen and discussed with Dr. Jewell. His addendum follows.   -----  ADDENDUM:  I personally interviewed and examined the patient. I agree with the history, physical, assessment, and plan as documented above. Changes to the physical examination, assessment and plan have been incorporated into the note by myself, as to make it a single cohesive document.    Go Jewell MD

## 2022-06-07 NOTE — PROGRESS NOTES
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 In August 2017 he developed drooping of the left eyelid. In August he developed diplopia. In 11/17 he developed limb weakness. No shortness of breath. In early 11/17 he started mestinon, which helped ptosis. In mid 11/17 he started prednisone: 10 mg daily and then increasing to 20 mg daily after about a week. After prednisone was started he reported less eyelid ptosis and overall improved strength - but not normal. He increased to 40 mg in early 2018. In 12/17 he also started mycophenolate. Prednisone was slowly tapered to 20 mg daily in 7/2018. By 9/1/18 he was at prednisone 10 mg daily. In 11/18 he developed some recurrence of diplopia and so we held at 10 mg for a couple months. Diplopia resolved. In the spring 2019 he reduced prednisone to 7.5 mg daily and developed diplopia. In 6/19 predniosne was increased back to 10 mg daily and mycophenolate to 2500 mg daily. In 12/19 he reduced pred to 10 alternating with 7.5 and in late 1/20 he reduced to 7.5 mg daily. In 3/20 reduced to 5 mg daily. Through 2020 he continued to slowly taper, by 12/20 he was down to prednisone 2.5 mg every other day, and later that month he stopped prednisone altogether. In 2/21 WBC counted noted to be 2.9. In 3/21 he reduced MMF to 1000 mg BID. WBC later in 3/21 4.2, in 6/21 it was 3.2 and 12/21 was 4.3.    Interval history:  I last saw him in clinic 12/7/21. He has been feeling well since then and has no concerns. He continues on MMF 1000 BID. He has not had any dysphagia, diplopia, ptosis, shortness of breath at rest or with exertion. He continues to bike 20 miles at a time and runs 5 miles. He lifts weights for 1 hour or more. He has no concerns regarding myasthenia.     Prior pertinent laboratory work-up:  11/17: AChR binding ab 0.7 nmol/L  (N < 0.4). TSH normal.   9/19/19: Normal CBC  3/11/2020 Normal CBC  7/20:  "WBC 3.8  20: WBC 4.6  21: WBC 2.9  3/19/21: WBC 4.2.   21: WBC 3.2. Neutrophil 1.6 (Normal 1.7-7).   9/3/21: WBC 3.7, neutrophil 1.8   : Normal CBC    Prior imagin/17: CT chest showed no evidence of thymoma. No mediastinal mass or lymphadenopathy.    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 Decrement was about 40% at the facial-nasalis.      Past Medical History:   Myasthenia gravis    Past Surgical History:  Sinus surgery    Family history:    There is no known family history of myopathy or other neuromuscular disorders.    Social History:    He denies tobacco, alcohol, or illicit drug use.     Medical Allergies:  NKDA     Current Medications:    Current Outpatient Medications   Medication     mycophenolate (GENERIC EQUIVALENT) 500 MG tablet     CELLCEPT (BRAND) 500 MG TABLET     predniSONE (DELTASONE) 5 MG tablet     pyridostigmine (MESTINON) 60 MG tablet     No current facility-administered medications for this visit.     Review of Systems: A review of systems was obtained and was negative except for what was noted above.    Physical examination:    /74   Pulse 77   Temp 97.3  F (36.3  C) (Temporal)   Ht 1.702 m (5' 7\")   Wt 62.4 kg (137 lb 9.6 oz)   BMI 21.55 kg/m      General Appearance: NAD    Skin: There are no rashes or other skin lesions.     Neurologic examination:     Mental status:  Patient is alert, attentive, and oriented x 3.  Language is coherent and fluent without aphasia.  Memory, comprehension and ability to follow commands were intact.        Cranial nerves: No ptosis or diplopia at baseline, even after 60 seconds of upgaze and lateral gaze. Eye closure strength is full. No dysarthria. Face strong and symmetric.      Motor exam: No atrophy or fasciculations.   Manual muscle testing revealed the following MRC grade muscle power:       Right " Left   Neck flexion 5     Neck extension: 5     Shoulder abduction:        5 5   Elbow extension: 5 5   Elbow flexion:            5 5   Wrist flexion:            5 5   Wrist extension:        5 5   FDI 5 5   Hip flexion 5 5   Knee extension 5 5   Dorsiflexion 5 5   Plantar flexion 5 5      Complex motor skills: No tremor or ataxia     Sensory exam: Normal LT and pin in hands and feet     Gait narrow and stable.     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 He remains in pharmacologic remission on MMF monotherapy. Considering his outstanding MG control over the last 2 years will begin mycophenolate optimization. Literature suggests that likelihood for MMF weaning success improves if weaning is by no slower than 500 mg every 6 months. The mild leukopenia that had been observed in 2021 has improved and would be expected to continue to improve on a lower mycophenolate dose.  All questions answerered. He agrees with the plan below.     Plan:      1. Mestinon: No longer needed. Stopped 12/20.   2. Prednisone: No longer needed. Stopped 12/20.   3. Mycophenolate: Reduce from 1000 mg BID to 500 mg in the morning at 1000mg at night  4. Monitoring labs: CBC today  5. If he develops inability to swallow of shortness of breath he should seek urgent medical attention and then let me know. If he develops worsening ocular symptoms, dysarthria, or  "limb weakness he should let me know ASAP.   6. Covid: Received his booster vaccine 12/2021  7. Follow up in 6 months. Sooner if needed.     Rogers Infante MD  Neurophysiology Fellow     This patient was seen and discussed with Dr. Jewell. His addendum follows.   -----  ADDENDUM:  I personally interviewed and examined the patient. I agree with the history, physical, assessment, and plan as documented above. Changes to the physical examination, assessment and plan have been incorporated into the note by myself, as to make it a single cohesive document.    Go Jewell MD    6/29/22: Note from David. He reports that \"For the past couple of days; starting on 6/26, I have noticed that in the morning my right eyelid is dropping a little bit.  The dropping feeling has gone away after a couple of hours after waking up, but today (6/29)  my right eyelid still feel drooping a little bit.On June 8, 2022, I reduced Mycophenolate from 4/day to 3/day.\" I advised him to go back up to MMF 1000 mg BID.     7/19/22: Note from Dvaid. He reports \" eyes went back to normal after going back to 2 tables in morning and 2 tables in the evening, total of four tables per day. The current prescription is for a total of three tables. Could you please update/change the prescription to a total of four tables and send the new prescription to the [facility], I will out of medication at the end this week (July 18th).\"         "

## 2022-06-23 DIAGNOSIS — G70.00 MYASTHENIA GRAVIS WITHOUT EXACERBATION (H): ICD-10-CM

## 2022-06-24 RX ORDER — MYCOPHENOLATE MOFETIL 500 MG/1
TABLET ORAL
Qty: 90 TABLET | Refills: 2 | Status: SHIPPED | OUTPATIENT
Start: 2022-06-24

## 2022-07-19 RX ORDER — MYCOPHENOLATE MOFETIL 500 MG/1
1000 TABLET ORAL 2 TIMES DAILY
Qty: 120 TABLET | Refills: 5 | Status: SHIPPED | OUTPATIENT
Start: 2022-07-19 | End: 2023-01-09

## 2022-07-31 ENCOUNTER — HEALTH MAINTENANCE LETTER (OUTPATIENT)
Age: 62
End: 2022-07-31

## 2022-08-04 ENCOUNTER — IMMUNIZATION (OUTPATIENT)
Dept: NURSING | Facility: CLINIC | Age: 62
End: 2022-08-04
Payer: COMMERCIAL

## 2022-08-04 PROCEDURE — 0054A COVID-19,PF,PFIZER (12+ YRS): CPT

## 2022-08-04 PROCEDURE — 91305 COVID-19,PF,PFIZER (12+ YRS): CPT

## 2022-10-15 ENCOUNTER — HEALTH MAINTENANCE LETTER (OUTPATIENT)
Age: 62
End: 2022-10-15

## 2022-11-25 ENCOUNTER — IMMUNIZATION (OUTPATIENT)
Dept: FAMILY MEDICINE | Facility: CLINIC | Age: 62
End: 2022-11-25
Payer: COMMERCIAL

## 2022-11-25 DIAGNOSIS — Z23 HIGH PRIORITY FOR 2019-NCOV VACCINE: Primary | ICD-10-CM

## 2022-11-25 PROCEDURE — 0124A COVID-19 VACCINE BIVALENT BOOSTER 12+ (PFIZER): CPT

## 2022-11-25 PROCEDURE — 91312 COVID-19 VACCINE BIVALENT BOOSTER 12+ (PFIZER): CPT

## 2022-12-06 ENCOUNTER — OFFICE VISIT (OUTPATIENT)
Dept: NEUROLOGY | Facility: CLINIC | Age: 62
End: 2022-12-06
Payer: COMMERCIAL

## 2022-12-06 VITALS
HEART RATE: 70 BPM | DIASTOLIC BLOOD PRESSURE: 69 MMHG | SYSTOLIC BLOOD PRESSURE: 115 MMHG | TEMPERATURE: 97.8 F | BODY MASS INDEX: 21.5 KG/M2 | HEIGHT: 67 IN | WEIGHT: 137 LBS

## 2022-12-06 DIAGNOSIS — G70.00 MYASTHENIA GRAVIS WITHOUT EXACERBATION (H): Primary | ICD-10-CM

## 2022-12-06 NOTE — LETTER
12/6/2022     RE: David Vera  [address] [place] [address]     Dear Colleague,    Thank you for referring your patient, David Vera, to the [facility]. Please see a copy of my visit note below.    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 In August 2017 he developed drooping of the left eyelid. In August he developed diplopia. In 11/17 he developed limb weakness. No shortness of breath. In early 11/17 he started mestinon, which helped ptosis. In mid 11/17 he started prednisone: 10 mg daily and then increasing to 20 mg daily after about a week. After prednisone was started he reported less eyelid ptosis and overall improved strength - but not normal. He increased to 40 mg in early 2018. In 12/17 he also started mycophenolate. Prednisone was slowly tapered to 20 mg daily in 7/2018. By 9/1/18 he was at prednisone 10 mg daily. In 11/18 he developed some recurrence of diplopia and so we held at 10 mg for a couple months. Diplopia resolved. In the spring 2019 he reduced prednisone to 7.5 mg daily and developed diplopia. In 6/19 predniosne was increased back to 10 mg daily and mycophenolate to 2500 mg daily. In 12/19 he reduced pred to 10 alternating with 7.5 and in late 1/20 he reduced to 7.5 mg daily. In 3/20 reduced to 5 mg daily. Through 2020 he continued to slowly taper, by 12/20 he was down to prednisone 2.5 mg every other day, and later that month he stopped prednisone altogether. In 2/21 WBC counted noted to be 2.9. In 3/21 he reduced MMF to 1000 mg BID. WBC later in 3/21 4.2, in 6/21 it was 3.2 and 12/21 was 4.3.In 6/22 we redued MMF to 1000/500 - he worsened (MG-ADL went from 0 to 4). MMF increase back to 1000 mg BID and he improved.     Interval history:  I last saw him in clinic 6/7/22.  After that visit we reduced MMF from 1000 mg BID to  "1000/500. Within a month he developed intermittent ptosis and diplopia. In  MMF increase back to 1000 mg BID and his symptoms resolved. Now he is again MG symptom free. No dysphagia, diplopia, ptosis, shortness of breath at rest or with exertion. He continues to be very active: bikes 20 miles at a time and runs 5 miles. He skis and snowshoes as well. MMF continues 1000 mg BID. No side effects.     Prior pertinent laboratory work-up:  : AChR binding ab 0.7 nmol/L  (N < 0.4). TSH normal.   19: Normal CBC  3/11/2020 Normal CBC  : WBC 3.8  20: WBC 4.6  21: WBC 2.9  3/19/21: WBC 4.2.   21: WBC 3.2. Neutrophil 1.6 (Normal 1.7-7).   9/3/21: WBC 3.7, neutrophil 1.8   : Normal CBC    Prior imagin/17: CT chest showed no evidence of thymoma. No mediastinal mass or lymphadenopathy.    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 Decrement was about 40% at the facial-nasalis.      Past Medical History:   Myasthenia gravis    Past Surgical History:  Sinus surgery    Family history:    There is no known family history of myopathy or other neuromuscular disorders.    Social History:    He denies tobacco, alcohol, or illicit drug use.     Medical Allergies:  NKDA     Current Medications:    Current Outpatient Medications   Medication     mycophenolate (GENERIC EQUIVALENT) 500 MG tablet     CELLCEPT (BRAND) 500 MG TABLET     mycophenolate (GENERIC EQUIVALENT) 500 MG tablet     predniSONE (DELTASONE) 5 MG tablet     pyridostigmine (MESTINON) 60 MG tablet     No current facility-administered medications for this visit.     Review of Systems: A review of systems was obtained and was negative except for what was noted above.    Physical examination:    /69   Pulse 70   Temp 97.8  F (36.6  C) (Temporal)   Ht 1.702 m (5' 7\")   Wt 62.1 kg (137 lb)   BMI 21.46 kg/m  "     General Appearance: NAD    Skin: There are no rashes or other skin lesions.     Neurologic examination:     Mental status:  Patient is alert, attentive, and oriented x 3.  Language is coherent and fluent without aphasia.  Memory, comprehension and ability to follow commands were intact.        Cranial nerves: No ptosis or diplopia at baseline, even after prolonged upgaze and lateral gaze. Eye closure strength is full. No dysarthria. Face strong and symmetric.      Motor exam: No atrophy or fasciculations.   Manual muscle testing revealed the following MRC grade muscle power:       Right Left   Neck flexion 5     Neck extension: 5     Shoulder abduction:        5 5   Elbow extension: 5 5   Elbow flexion:            5 5   Wrist flexion:            5 5   Wrist extension:        5 5   FDI 5 5   Hip flexion 5 5   Knee extension 5 5   Dorsiflexion 5 5   Plantar flexion 5 5      Complex motor skills: No tremor or ataxia     Sensory exam: Normal LT in hands and feet     Gait narrow and stable.     Deep tendon reflexes:    Right Left   Triceps 2 2   Biceps 2 2   Brachioradialis 2 2   Knee jerk 2 2   Ankle jerk 2 2      MG Outcomes   MG ADL MG Composite MG medications   3/10/20 0 0 Prednisone 7.5 mg daily, MMF 1500 mg/1000 mg   7/14/20 0 0 Prednisone 5 mg daily, MMF 1500 mg/1000 mg   12/1/20 0 0 Prednisone 2.5 mg every other day, MMF 1500 mg/1000 mg   3/16/21 0 0 MMF 1500 mg/1000 mg   6/22/21   MMF 1000 mg BID   9/14/21 0 0 MMF 1000 mg BID   12/7/21 0 0 MMF 1000 mg BID    6/7/22 0 0 MMF 1000 mg BID   6/29/22 4 Not performed (phone) MMF 1000 mg/500 mg   12/6/22 0 0 MMF 1000 mg BID      Assessment:    David Vera is a 62 year old man with 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 After last visit we reduced MMF by 500 mg and he worsened. Now he is back in pharmacologic remission on MMF monotherapy.  All questions answerered.    Plan:      1. Mestinon: No longer needed. Stopped 12/20.   2.  Prednisone: No longer needed. Stopped 12/20.   3. Mycophenolate: Continue 1000 mg BID   4. Monitoring labs: Will repeat CBC at next visit  5. If he develops inability to swallow of shortness of breath he should seek urgent medical attention and then let me know. If he develops worsening ocular symptoms, dysarthria, or limb weakness he should let me know ASAP.   6. Covid: Received his booster vaccine 12/2021  7. Follow up in 6 months. Sooner if needed.     -----  Again, thank you for allowing me to participate in the care of your patient.      Sincerely,    Go Jewell MD

## 2022-12-06 NOTE — PROGRESS NOTES
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 In August 2017 he developed drooping of the left eyelid. In August he developed diplopia. In 11/17 he developed limb weakness. No shortness of breath. In early 11/17 he started mestinon, which helped ptosis. In mid 11/17 he started prednisone: 10 mg daily and then increasing to 20 mg daily after about a week. After prednisone was started he reported less eyelid ptosis and overall improved strength - but not normal. He increased to 40 mg in early 2018. In 12/17 he also started mycophenolate. Prednisone was slowly tapered to 20 mg daily in 7/2018. By 9/1/18 he was at prednisone 10 mg daily. In 11/18 he developed some recurrence of diplopia and so we held at 10 mg for a couple months. Diplopia resolved. In the spring 2019 he reduced prednisone to 7.5 mg daily and developed diplopia. In 6/19 predniosne was increased back to 10 mg daily and mycophenolate to 2500 mg daily. In 12/19 he reduced pred to 10 alternating with 7.5 and in late 1/20 he reduced to 7.5 mg daily. In 3/20 reduced to 5 mg daily. Through 2020 he continued to slowly taper, by 12/20 he was down to prednisone 2.5 mg every other day, and later that month he stopped prednisone altogether. In 2/21 WBC counted noted to be 2.9. In 3/21 he reduced MMF to 1000 mg BID. WBC later in 3/21 4.2, in 6/21 it was 3.2 and 12/21 was 4.3.In 6/22 we redued MMF to 1000/500 - he worsened (MG-ADL went from 0 to 4). MMF increase back to 1000 mg BID and he improved.     Interval history:  I last saw him in clinic 6/7/22.  After that visit we reduced MMF from 1000 mg BID to 1000/500. Within a month he developed intermittent ptosis and diplopia. In 7/22 MMF increase back to 1000 mg BID and his symptoms resolved. Now he is again MG symptom free. No dysphagia, diplopia, ptosis, shortness of breath at rest or with exertion. He continues to be very active: bikes 20  Please give patient results below.     Notes recorded by Gerhard Ugarte PA-C on 12/14/2018 at 9:06 PM CST  Please call patient.  Her thyroid is normal.  Is she still having the chest and back pain?  Gerhard Ugarte PA-C   "miles at a time and runs 5 miles. He skis and snowshoes as well. MMF continues 1000 mg BID. No side effects.     Prior pertinent laboratory work-up:  : AChR binding ab 0.7 nmol/L  (N < 0.4). TSH normal.   19: Normal CBC  3/11/2020 Normal CBC  : WBC 3.8  20: WBC 4.6  21: WBC 2.9  3/19/21: WBC 4.2.   21: WBC 3.2. Neutrophil 1.6 (Normal 1.7-7).   9/3/21: WBC 3.7, neutrophil 1.8   : Normal CBC    Prior imagin/17: CT chest showed no evidence of thymoma. No mediastinal mass or lymphadenopathy.    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 Decrement was about 40% at the facial-nasalis.      Past Medical History:   Myasthenia gravis    Past Surgical History:  Sinus surgery    Family history:    There is no known family history of myopathy or other neuromuscular disorders.    Social History:    He denies tobacco, alcohol, or illicit drug use.     Medical Allergies:  NKDA     Current Medications:    Current Outpatient Medications   Medication     mycophenolate (GENERIC EQUIVALENT) 500 MG tablet     CELLCEPT (BRAND) 500 MG TABLET     mycophenolate (GENERIC EQUIVALENT) 500 MG tablet     predniSONE (DELTASONE) 5 MG tablet     pyridostigmine (MESTINON) 60 MG tablet     No current facility-administered medications for this visit.     Review of Systems: A review of systems was obtained and was negative except for what was noted above.    Physical examination:    /69   Pulse 70   Temp 97.8  F (36.6  C) (Temporal)   Ht 1.702 m (5' 7\")   Wt 62.1 kg (137 lb)   BMI 21.46 kg/m      General Appearance: NAD    Skin: There are no rashes or other skin lesions.     Neurologic examination:     Mental status:  Patient is alert, attentive, and oriented x 3.  Language is coherent and fluent without aphasia.  Memory, comprehension and ability to follow commands were intact.   "      Cranial nerves: No ptosis or diplopia at baseline, even after prolonged upgaze and lateral gaze. Eye closure strength is full. No dysarthria. Face strong and symmetric.      Motor exam: No atrophy or fasciculations.   Manual muscle testing revealed the following MRC grade muscle power:       Right Left   Neck flexion 5     Neck extension: 5     Shoulder abduction:        5 5   Elbow extension: 5 5   Elbow flexion:            5 5   Wrist flexion:            5 5   Wrist extension:        5 5   FDI 5 5   Hip flexion 5 5   Knee extension 5 5   Dorsiflexion 5 5   Plantar flexion 5 5      Complex motor skills: No tremor or ataxia     Sensory exam: Normal LT in hands and feet     Gait narrow and stable.     Deep tendon reflexes:    Right Left   Triceps 2 2   Biceps 2 2   Brachioradialis 2 2   Knee jerk 2 2   Ankle jerk 2 2      MG Outcomes   MG ADL MG Composite MG medications   3/10/20 0 0 Prednisone 7.5 mg daily, MMF 1500 mg/1000 mg   7/14/20 0 0 Prednisone 5 mg daily, MMF 1500 mg/1000 mg   12/1/20 0 0 Prednisone 2.5 mg every other day, MMF 1500 mg/1000 mg   3/16/21 0 0 MMF 1500 mg/1000 mg   6/22/21   MMF 1000 mg BID   9/14/21 0 0 MMF 1000 mg BID   12/7/21 0 0 MMF 1000 mg BID    6/7/22 0 0 MMF 1000 mg BID   6/29/22 4 Not performed (phone) MMF 1000 mg/500 mg   12/6/22 0 0 MMF 1000 mg BID      Assessment:    David Vera is a 62 year old man with 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 After last visit we reduced MMF by 500 mg and he worsened. Now he is back in pharmacologic remission on MMF monotherapy.  All questions answerered.    Plan:      1. Mestinon: No longer needed. Stopped 12/20.   2. Prednisone: No longer needed. Stopped 12/20.   3. Mycophenolate: Continue 1000 mg BID   4. Monitoring labs: Will repeat CBC at next visit  5. If he develops inability to swallow of shortness of breath he should seek urgent medical attention and then let me know. If he develops worsening ocular  symptoms, dysarthria, or limb weakness he should let me know ASAP.   6. Covid: Received his booster vaccine 12/2021  7. Follow up in 6 months. Sooner if needed.     -----

## 2023-01-09 DIAGNOSIS — G70.00 MYASTHENIA GRAVIS WITHOUT EXACERBATION (H): ICD-10-CM

## 2023-01-09 RX ORDER — MYCOPHENOLATE MOFETIL 500 MG/1
TABLET ORAL
Qty: 120 TABLET | Refills: 0 | Status: SHIPPED | OUTPATIENT
Start: 2023-01-09 | End: 2023-02-06

## 2023-02-06 DIAGNOSIS — G70.00 MYASTHENIA GRAVIS WITHOUT EXACERBATION (H): ICD-10-CM

## 2023-02-06 RX ORDER — MYCOPHENOLATE MOFETIL 500 MG/1
TABLET ORAL
Qty: 120 TABLET | Refills: 0 | Status: SHIPPED | OUTPATIENT
Start: 2023-02-06 | End: 2023-03-13

## 2023-03-13 DIAGNOSIS — G70.00 MYASTHENIA GRAVIS WITHOUT EXACERBATION (H): ICD-10-CM

## 2023-03-13 RX ORDER — MYCOPHENOLATE MOFETIL 500 MG/1
TABLET ORAL
Qty: 120 TABLET | Refills: 0 | Status: SHIPPED | OUTPATIENT
Start: 2023-03-13 | End: 2023-04-04

## 2023-06-01 ENCOUNTER — HEALTH MAINTENANCE LETTER (OUTPATIENT)
Age: 63
End: 2023-06-01

## 2023-06-21 ENCOUNTER — MYC REFILL (OUTPATIENT)
Dept: NEUROLOGY | Facility: CLINIC | Age: 63
End: 2023-06-21

## 2023-06-21 DIAGNOSIS — G70.00 MYASTHENIA GRAVIS WITHOUT EXACERBATION (H): ICD-10-CM

## 2023-06-21 RX ORDER — MYCOPHENOLATE MOFETIL 500 MG/1
1000 TABLET ORAL 2 TIMES DAILY
Qty: 360 TABLET | Refills: 0 | Status: SHIPPED | OUTPATIENT
Start: 2023-06-21 | End: 2023-09-19

## 2023-07-11 ENCOUNTER — OFFICE VISIT (OUTPATIENT)
Dept: NEUROLOGY | Facility: CLINIC | Age: 63
End: 2023-07-11
Payer: COMMERCIAL

## 2023-07-11 VITALS
DIASTOLIC BLOOD PRESSURE: 65 MMHG | BODY MASS INDEX: 21.53 KG/M2 | HEIGHT: 67 IN | WEIGHT: 137.2 LBS | HEART RATE: 66 BPM | TEMPERATURE: 97.3 F | SYSTOLIC BLOOD PRESSURE: 106 MMHG

## 2023-07-11 DIAGNOSIS — G70.00 MYASTHENIA GRAVIS WITHOUT EXACERBATION (H): Primary | ICD-10-CM

## 2023-07-11 NOTE — PROGRESS NOTES
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 In August 2017 he developed drooping of the left eyelid. In August he developed diplopia. In 11/17 he developed limb weakness. No shortness of breath. In early 11/17 he started mestinon, which helped ptosis. In mid 11/17 he started prednisone: 10 mg daily and then increasing to 20 mg daily after about a week. After prednisone was started he reported less eyelid ptosis and overall improved strength - but not normal. He increased to 40 mg in early 2018. In 12/17 he also started mycophenolate. Prednisone was slowly tapered to 20 mg daily in 7/2018. By 9/1/18 he was at prednisone 10 mg daily. In 11/18 he developed some recurrence of diplopia and so we held at 10 mg for a couple months. Diplopia resolved. In the spring 2019 he reduced prednisone to 7.5 mg daily and developed diplopia. In 6/19 predniosne was increased back to 10 mg daily and mycophenolate to 2500 mg daily. In 12/19 he reduced pred to 10 alternating with 7.5 and in late 1/20 he reduced to 7.5 mg daily. In 3/20 reduced to 5 mg daily. Through 2020 he continued to slowly taper, by 12/20 he was down to prednisone 2.5 mg every other day, and later that month he stopped prednisone altogether. In 2/21 WBC counted noted to be 2.9. In 3/21 he reduced MMF to 1000 mg BID. WBC later in 3/21 4.2, in 6/21 it was 3.2 and 12/21 was 4.3. In 6/22 we reduced MMF to 1000/500 - he worsened (MG-ADL went from 0 to 4). MMF increase back to 1000 mg BID and he improved.     Interval history:  I last saw him in clinic 12/6/22. He continues MMF 1000 mg BID. Since we last met he has had no relapses. His MG remains essentially symptom free. No dysphagia, diplopia, ptosis, shortness of breath at rest or with exertion. He continues to be very active: bikes 20 miles at a time and runs 5 miles. He skis and snowshoes in the winter.    Prior pertinent laboratory  "work-up:  : AChR binding ab 0.7 nmol/L  (N < 0.4). TSH normal.   19: Normal CBC  3/11/2020 Normal CBC  : WBC 3.8  20: WBC 4.6  21: WBC 2.9  3/19/21: WBC 4.2.   21: WBC 3.2. Neutrophil 1.6 (Normal 1.7-7).   9/3/21: WBC 3.7, neutrophil 1.8   : Normal CBC    Prior imagin/17: CT chest showed no evidence of thymoma. No mediastinal mass or lymphadenopathy.    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 Decrement was about 40% at the facial-nasalis.      Past Medical History:   Myasthenia gravis    Past Surgical History:  Sinus surgery    Family history:    There is no known family history of myopathy or other neuromuscular disorders.    Social History:    He denies tobacco, alcohol, or illicit drug use.     Medical Allergies:  NKDA     Current Medications:    Current Outpatient Medications   Medication     CELLCEPT (BRAND) 500 MG TABLET     mycophenolate (GENERIC EQUIVALENT) 500 MG tablet     mycophenolate (GENERIC EQUIVALENT) 500 MG tablet     predniSONE (DELTASONE) 5 MG tablet     pyridostigmine (MESTINON) 60 MG tablet     No current facility-administered medications for this visit.     Review of Systems: A review of systems was obtained and was negative except for what was noted above.    Physical examination:    /65   Pulse 66   Temp 97.3  F (36.3  C) (Temporal)   Ht 1.702 m (5' 7\")   Wt 62.2 kg (137 lb 3.2 oz)   BMI 21.49 kg/m      General Appearance: NAD    Skin: There are no rashes or other skin lesions.     Neurologic examination:     Mental status:  Patient is alert, attentive, and oriented x 3.  Language is coherent and fluent without aphasia.  Memory, comprehension and ability to follow commands were intact.        Cranial nerves: No ptosis or diplopia at baseline, even after prolonged upgaze and lateral gaze. Eye closure strength is full. No " dysarthria. Face strong and symmetric.      Motor exam: No atrophy or fasciculations.   Manual muscle testing revealed the following MRC grade muscle power:       Right Left   Neck flexion 5     Neck extension: 5     Shoulder abduction:        5 5   Elbow extension: 5 5   Elbow flexion:            5 5   Wrist flexion:            5 5   Wrist extension:        5 5   FDI 5 5   Hip flexion 5 5   Knee extension 5 5   Dorsiflexion 5 5   Plantar flexion 5 5      Complex motor skills: No tremor or ataxia     Sensory exam: Normal LT in hands and feet     Gait narrow and stable.     Deep tendon reflexes:    Right Left   Triceps 2 2   Biceps 2 2   Brachioradialis 2 2   Knee jerk 2 2   Ankle jerk 2 2      MG Outcomes   MG ADL MG Composite MG medications   3/10/20 0 0 Prednisone 7.5 mg daily, MMF 1500 mg/1000 mg   7/14/20 0 0 Prednisone 5 mg daily, MMF 1500 mg/1000 mg   12/1/20 0 0 Prednisone 2.5 mg every other day, MMF 1500 mg/1000 mg   3/16/21 0 0 MMF 1500 mg/1000 mg   6/22/21   MMF 1000 mg BID   9/14/21 0 0 MMF 1000 mg BID   12/7/21 0 0 MMF 1000 mg BID    6/7/22 0 0 MMF 1000 mg BID   6/29/22 4 Not performed (phone) MMF 1000 mg/500 mg   12/6/22 0 0 MMF 1000 mg BID   7/11/23 0 0 MMF 1000 mg BID      Assessment:    David Vera is a 62 year old man with 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 He is in pharmacologic remission on MMF monotherapy, and has no MG manifestations.  All questions answerered.    Plan:      1. Mestinon: No longer needed. Stopped 12/20.   2. Prednisone: No longer needed. Stopped 12/20.   3. Mycophenolate: Continue 1000 mg BID   4. Monitoring labs: Will repeat CBC. Monitoring leukopenia.   5. If he develops inability to swallow of shortness of breath he should seek urgent medical attention and then let me know. If he develops worsening ocular symptoms, dysarthria, or limb weakness he should let me know ASAP.   6. Follow up in 6 months. Sooner if needed.     -----

## 2023-07-11 NOTE — LETTER
7/11/2023       RE: David Vera  [address] [place] [address]         Dear Colleague,    Thank you for referring your patient, David Vera, to the  [facility] at [facility]. Please see a copy of my visit note below.    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 In August 2017 he developed drooping of the left eyelid. In August he developed diplopia. In 11/17 he developed limb weakness. No shortness of breath. In early 11/17 he started mestinon, which helped ptosis. In mid 11/17 he started prednisone: 10 mg daily and then increasing to 20 mg daily after about a week. After prednisone was started he reported less eyelid ptosis and overall improved strength - but not normal. He increased to 40 mg in early 2018. In 12/17 he also started mycophenolate. Prednisone was slowly tapered to 20 mg daily in 7/2018. By 9/1/18 he was at prednisone 10 mg daily. In 11/18 he developed some recurrence of diplopia and so we held at 10 mg for a couple months. Diplopia resolved. In the spring 2019 he reduced prednisone to 7.5 mg daily and developed diplopia. In 6/19 predniosne was increased back to 10 mg daily and mycophenolate to 2500 mg daily. In 12/19 he reduced pred to 10 alternating with 7.5 and in late 1/20 he reduced to 7.5 mg daily. In 3/20 reduced to 5 mg daily. Through 2020 he continued to slowly taper, by 12/20 he was down to prednisone 2.5 mg every other day, and later that month he stopped prednisone altogether. In 2/21 WBC counted noted to be 2.9. In 3/21 he reduced MMF to 1000 mg BID. WBC later in 3/21 4.2, in 6/21 it was 3.2 and 12/21 was 4.3. In 6/22 we reduced MMF to 1000/500 - he worsened (MG-ADL went from 0 to 4). MMF increase back to 1000 mg BID and he improved.     Interval history:  I last saw him in clinic 12/6/22. He continues MMF 1000 mg  "BID. Since we last met he has had no relapses. His MG remains essentially symptom free. No dysphagia, diplopia, ptosis, shortness of breath at rest or with exertion. He continues to be very active: bikes 20 miles at a time and runs 5 miles. He skis and snowshoes in the winter.    Prior pertinent laboratory work-up:  : AChR binding ab 0.7 nmol/L  (N < 0.4). TSH normal.   19: Normal CBC  3/11/2020 Normal CBC  : WBC 3.8  20: WBC 4.6  21: WBC 2.9  3/19/21: WBC 4.2.   21: WBC 3.2. Neutrophil 1.6 (Normal 1.7-7).   9/3/21: WBC 3.7, neutrophil 1.8   : Normal CBC    Prior imagin/17: CT chest showed no evidence of thymoma. No mediastinal mass or lymphadenopathy.    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 Decrement was about 40% at the facial-nasalis.      Past Medical History:   Myasthenia gravis    Past Surgical History:  Sinus surgery    Family history:    There is no known family history of myopathy or other neuromuscular disorders.    Social History:    He denies tobacco, alcohol, or illicit drug use.     Medical Allergies:  NKDA     Current Medications:    Current Outpatient Medications   Medication    CELLCEPT (BRAND) 500 MG TABLET    mycophenolate (GENERIC EQUIVALENT) 500 MG tablet    mycophenolate (GENERIC EQUIVALENT) 500 MG tablet    predniSONE (DELTASONE) 5 MG tablet    pyridostigmine (MESTINON) 60 MG tablet     No current facility-administered medications for this visit.     Review of Systems: A review of systems was obtained and was negative except for what was noted above.    Physical examination:    /65   Pulse 66   Temp 97.3  F (36.3  C) (Temporal)   Ht 1.702 m (5' 7\")   Wt 62.2 kg (137 lb 3.2 oz)   BMI 21.49 kg/m      General Appearance: NAD    Skin: There are no rashes or other skin lesions.     Neurologic examination:     Mental status:  " Patient is alert, attentive, and oriented x 3.  Language is coherent and fluent without aphasia.  Memory, comprehension and ability to follow commands were intact.        Cranial nerves: No ptosis or diplopia at baseline, even after prolonged upgaze and lateral gaze. Eye closure strength is full. No dysarthria. Face strong and symmetric.      Motor exam: No atrophy or fasciculations.   Manual muscle testing revealed the following MRC grade muscle power:       Right Left   Neck flexion 5     Neck extension: 5     Shoulder abduction:        5 5   Elbow extension: 5 5   Elbow flexion:            5 5   Wrist flexion:            5 5   Wrist extension:        5 5   FDI 5 5   Hip flexion 5 5   Knee extension 5 5   Dorsiflexion 5 5   Plantar flexion 5 5      Complex motor skills: No tremor or ataxia     Sensory exam: Normal LT in hands and feet     Gait narrow and stable.     Deep tendon reflexes:    Right Left   Triceps 2 2   Biceps 2 2   Brachioradialis 2 2   Knee jerk 2 2   Ankle jerk 2 2      MG Outcomes   MG ADL MG Composite MG medications   3/10/20 0 0 Prednisone 7.5 mg daily, MMF 1500 mg/1000 mg   7/14/20 0 0 Prednisone 5 mg daily, MMF 1500 mg/1000 mg   12/1/20 0 0 Prednisone 2.5 mg every other day, MMF 1500 mg/1000 mg   3/16/21 0 0 MMF 1500 mg/1000 mg   6/22/21   MMF 1000 mg BID   9/14/21 0 0 MMF 1000 mg BID   12/7/21 0 0 MMF 1000 mg BID    6/7/22 0 0 MMF 1000 mg BID   6/29/22 4 Not performed (phone) MMF 1000 mg/500 mg   12/6/22 0 0 MMF 1000 mg BID   7/11/23 0 0 MMF 1000 mg BID      Assessment:    David Vera is a 62 year old man with 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 He is in pharmacologic remission on MMF monotherapy, and has no MG manifestations.  All questions answerered.    Plan:      1. Mestinon: No longer needed. Stopped 12/20.   2. Prednisone: No longer needed. Stopped 12/20.   3. Mycophenolate: Continue 1000 mg BID   4. Monitoring labs: Will repeat CBC. Monitoring  leukopenia.   5. If he develops inability to swallow of shortness of breath he should seek urgent medical attention and then let me know. If he develops worsening ocular symptoms, dysarthria, or limb weakness he should let me know ASAP.   6. Follow up in 6 months. Sooner if needed.   -----    Again, thank you for allowing me to participate in the care of your patient.      Sincerely,    Go Jewell MD

## 2023-07-12 ENCOUNTER — LAB (OUTPATIENT)
Dept: NEUROLOGY | Facility: CLINIC | Age: 63
End: 2023-07-12
Payer: COMMERCIAL

## 2023-07-12 DIAGNOSIS — G70.00 MYASTHENIA GRAVIS WITHOUT EXACERBATION (H): ICD-10-CM

## 2023-07-12 LAB
ERYTHROCYTE [DISTWIDTH] IN BLOOD BY AUTOMATED COUNT: 13.4 % (ref 10–15)
HCT VFR BLD AUTO: 45.2 % (ref 40–53)
HGB BLD-MCNC: 13.8 G/DL (ref 13.3–17.7)
MCH RBC QN AUTO: 29.7 PG (ref 26.5–33)
MCHC RBC AUTO-ENTMCNC: 30.5 G/DL (ref 31.5–36.5)
MCV RBC AUTO: 97 FL (ref 78–100)
PLATELET # BLD AUTO: 284 10E3/UL (ref 150–450)
RBC # BLD AUTO: 4.64 10E6/UL (ref 4.4–5.9)
WBC # BLD AUTO: 4.3 10E3/UL (ref 4–11)

## 2023-07-12 PROCEDURE — 85027 COMPLETE CBC AUTOMATED: CPT | Mod: ORL | Performed by: PSYCHIATRY & NEUROLOGY

## 2023-09-19 DIAGNOSIS — G70.00 MYASTHENIA GRAVIS WITHOUT EXACERBATION (H): ICD-10-CM

## 2023-09-19 RX ORDER — MYCOPHENOLATE MOFETIL 500 MG/1
1000 TABLET ORAL 2 TIMES DAILY
Qty: 360 TABLET | Refills: 0 | Status: SHIPPED | OUTPATIENT
Start: 2023-09-19 | End: 2023-12-26

## 2023-12-24 DIAGNOSIS — G70.00 MYASTHENIA GRAVIS WITHOUT EXACERBATION (H): ICD-10-CM

## 2023-12-26 RX ORDER — MYCOPHENOLATE MOFETIL 500 MG/1
1000 TABLET ORAL 2 TIMES DAILY
Qty: 360 TABLET | Refills: 0 | Status: SHIPPED | OUTPATIENT
Start: 2023-12-26 | End: 2024-03-19

## 2024-01-16 ENCOUNTER — OFFICE VISIT (OUTPATIENT)
Dept: NEUROLOGY | Facility: CLINIC | Age: 64
End: 2024-01-16
Payer: COMMERCIAL

## 2024-01-16 VITALS
OXYGEN SATURATION: 100 % | WEIGHT: 140.2 LBS | BODY MASS INDEX: 21.96 KG/M2 | HEART RATE: 62 BPM | DIASTOLIC BLOOD PRESSURE: 75 MMHG | TEMPERATURE: 97 F | SYSTOLIC BLOOD PRESSURE: 127 MMHG

## 2024-01-16 DIAGNOSIS — G70.00 MYASTHENIA GRAVIS WITHOUT EXACERBATION (H): Primary | ICD-10-CM

## 2024-01-16 NOTE — LETTER
1/16/2024       RE: David Vera  [address] [place] [address]       Dear Colleague,    Thank you for referring your patient, David Vera, to the  [facility] at [facility]. Please see a copy of my visit note below.    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 In August 2017 he developed drooping of the left eyelid. In August he developed diplopia. In 11/17 he developed limb weakness. No shortness of breath. In early 11/17 he started mestinon, which helped ptosis. In mid 11/17 he started prednisone: 10 mg daily and then increasing to 20 mg daily after about a week. After prednisone was started he reported less eyelid ptosis and overall improved strength - but not normal. He increased to 40 mg in early 2018. In 12/17 he also started mycophenolate. Prednisone was slowly tapered to 20 mg daily in 7/2018. By 9/1/18 he was at prednisone 10 mg daily. In 11/18 he developed some recurrence of diplopia and so we held at 10 mg for a couple months. Diplopia resolved. In the spring 2019 he reduced prednisone to 7.5 mg daily and developed diplopia. In 6/19 predniosne was increased back to 10 mg daily and mycophenolate to 2500 mg daily. In 12/19 he reduced pred to 10 alternating with 7.5 and in late 1/20 he reduced to 7.5 mg daily. In 3/20 reduced to 5 mg daily. Through 2020 he continued to slowly taper, by 12/20 he was down to prednisone 2.5 mg every other day, and later that month he stopped prednisone altogether. In 2/21 WBC counted noted to be 2.9. In 3/21 he reduced MMF to 1000 mg BID. WBC later in 3/21 4.2, in 6/21 it was 3.2 and 12/21 was 4.3. In 6/22 we reduced MMF to 1000/500 - he worsened (MG-ADL went from 0 to 4). MMF increase back to 1000 mg BID and he improved.     Interval history:  I last saw him in clinic 7/11/23. He continues to do very well.  Since his last visit he has had no MG relapses. He is completely MG symptom free: no dysphagia, diplopia, ptosis, shortness of breath at rest or with exertion. He continues to be very active: bikes, runs and skis.    Prior pertinent laboratory work-up:  : AChR binding ab 0.7 nmol/L  (N < 0.4). TSH normal.   19: Normal CBC  3/11/2020 Normal CBC  : WBC 3.8  20: WBC 4.6  21: WBC 2.9  3/19/21: WBC 4.2.   21: WBC 3.2. Neutrophil 1.6 (Normal 1.7-7).   9/3/21: WBC 3.7, neutrophil 1.8   : Normal CBC    Prior imagin/17: CT chest showed no evidence of thymoma. No mediastinal mass or lymphadenopathy.    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 Decrement was about 40% at the facial-nasalis.      Past Medical History:   Myasthenia gravis    Past Surgical History:  Sinus surgery    Family history:    There is no known family history of myopathy or other neuromuscular disorders.    Social History:    He denies tobacco, alcohol, or illicit drug use.     Medical Allergies:  NKDA     Current Medications:    Current Outpatient Medications   Medication    CELLCEPT (BRAND) 500 MG TABLET    mycophenolate (GENERIC EQUIVALENT) 500 MG tablet    mycophenolate (GENERIC EQUIVALENT) 500 MG tablet    predniSONE (DELTASONE) 5 MG tablet    pyridostigmine (MESTINON) 60 MG tablet     No current facility-administered medications for this visit.     Review of Systems: A review of systems was obtained and was negative except for what was noted above.    Physical examination:    /75 (BP Location: Right arm, Patient Position: Sitting, Cuff Size: Adult Regular)   Pulse 62   Temp 97  F (36.1  C) (Temporal)   Wt 63.6 kg (140 lb 3.2 oz)   SpO2 100%   BMI 21.96 kg/m      General Appearance: NAD    Skin: There are no rashes or other skin lesions.     Neurologic examination:     Mental status:   Patient is alert, attentive, and oriented x 3.  Language is coherent and fluent without aphasia.  Memory, comprehension and ability to follow commands were intact.        Cranial nerves: No ptosis or diplopia at baseline, even after 60 seconds upgaze and lateral gaze. Eye closure strength is full. No dysarthria. Face strong and symmetric.      Motor exam: No atrophy or fasciculations.   Manual muscle testing revealed the following MRC grade muscle power:       Right Left   Neck flexion 5     Neck extension: 5     Shoulder abduction:        5 5   Elbow extension: 5 5   Elbow flexion:            5 5   Wrist flexion:            5 5   Wrist extension:        5 5   FDI 5 5   Hip flexion 5 5   Knee extension 5 5   Dorsiflexion 5 5   Plantar flexion 5 5      Complex motor skills: No tremor or ataxia     Sensory exam: Normal LT in hands and feet     Gait narrow and stable.     Deep tendon reflexes:    Right Left   Triceps 2 2   Biceps 2 2   Brachioradialis 2 2   Knee jerk 2 2   Ankle jerk 2 2      MG Outcomes   MG ADL MG Composite MG medications   3/10/20 0 0 Prednisone 7.5 mg daily, MMF 1500 mg/1000 mg   7/14/20 0 0 Prednisone 5 mg daily, MMF 1500 mg/1000 mg   12/1/20 0 0 Prednisone 2.5 mg every other day, MMF 1500 mg/1000 mg   3/16/21 0 0 MMF 1500 mg/1000 mg   6/22/21   MMF 1000 mg BID   9/14/21 0 0 MMF 1000 mg BID   12/7/21 0 0 MMF 1000 mg BID    6/7/22 0 0 MMF 1000 mg BID   6/29/22 4 Not performed (phone) MMF 1000 mg/500 mg   12/6/22 0 0 MMF 1000 mg BID   7/11/23 0 0 MMF 1000 mg BID   1/16/24 0 0 MMF 1000 mg BID      Assessment:    David Vera is a 63 year old man with 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 He is in pharmacologic remission on MMF monotherapy, and has no MG manifestations. Last MMF wean was about 1.5 years ago. We discussed another MMF reduction. We elected to hold off for now, but if he is stable at his next visit (2 years out from the last dose reduction), then will  reconsider.  All questions answerered.    Plan:      1. Mestinon: No longer needed. Stopped 12/20.   2. Prednisone: No longer needed. Stopped 12/20.   3. Mycophenolate: Continue 1000 mg BID. If stable at next visit anticipate a small reduction, probably 1000/750 mg with a goal to taper by no faster than 250 mg every 6 months.   4. Monitoring labs: Will repeat CBC. Monitoring leukopenia.   5. If he develops inability to swallow of shortness of breath he should seek urgent medical attention and then let me know. If he develops worsening ocular symptoms, dysarthria, or limb weakness he should let me know ASAP.   6. Follow up in 6 months. Sooner if needed.   -----    Again, thank you for allowing me to participate in the care of your patient.      Sincerely,    Go Jewell MD

## 2024-01-16 NOTE — PROGRESS NOTES
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 In August 2017 he developed drooping of the left eyelid. In August he developed diplopia. In 11/17 he developed limb weakness. No shortness of breath. In early 11/17 he started mestinon, which helped ptosis. In mid 11/17 he started prednisone: 10 mg daily and then increasing to 20 mg daily after about a week. After prednisone was started he reported less eyelid ptosis and overall improved strength - but not normal. He increased to 40 mg in early 2018. In 12/17 he also started mycophenolate. Prednisone was slowly tapered to 20 mg daily in 7/2018. By 9/1/18 he was at prednisone 10 mg daily. In 11/18 he developed some recurrence of diplopia and so we held at 10 mg for a couple months. Diplopia resolved. In the spring 2019 he reduced prednisone to 7.5 mg daily and developed diplopia. In 6/19 predniosne was increased back to 10 mg daily and mycophenolate to 2500 mg daily. In 12/19 he reduced pred to 10 alternating with 7.5 and in late 1/20 he reduced to 7.5 mg daily. In 3/20 reduced to 5 mg daily. Through 2020 he continued to slowly taper, by 12/20 he was down to prednisone 2.5 mg every other day, and later that month he stopped prednisone altogether. In 2/21 WBC counted noted to be 2.9. In 3/21 he reduced MMF to 1000 mg BID. WBC later in 3/21 4.2, in 6/21 it was 3.2 and 12/21 was 4.3. In 6/22 we reduced MMF to 1000/500 - he worsened (MG-ADL went from 0 to 4). MMF increase back to 1000 mg BID and he improved.     Interval history:  I last saw him in clinic 7/11/23. He continues to do very well. Since his last visit he has had no MG relapses. He is completely MG symptom free: no dysphagia, diplopia, ptosis, shortness of breath at rest or with exertion. He continues to be very active: bikes, runs and skis.    Prior pertinent laboratory work-up:  11/17: AChR binding ab 0.7 nmol/L  (N < 0.4). TSH normal.    19: Normal CBC  3/11/2020 Normal CBC  : WBC 3.8  20: WBC 4.6  21: WBC 2.9  3/19/21: WBC 4.2.   21: WBC 3.2. Neutrophil 1.6 (Normal 1.7-7).   9/3/21: WBC 3.7, neutrophil 1.8   : Normal CBC    Prior imagin/17: CT chest showed no evidence of thymoma. No mediastinal mass or lymphadenopathy.    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 Decrement was about 40% at the facial-nasalis.      Past Medical History:   Myasthenia gravis    Past Surgical History:  Sinus surgery    Family history:    There is no known family history of myopathy or other neuromuscular disorders.    Social History:    He denies tobacco, alcohol, or illicit drug use.     Medical Allergies:  NKDA     Current Medications:    Current Outpatient Medications   Medication    CELLCEPT (BRAND) 500 MG TABLET    mycophenolate (GENERIC EQUIVALENT) 500 MG tablet    mycophenolate (GENERIC EQUIVALENT) 500 MG tablet    predniSONE (DELTASONE) 5 MG tablet    pyridostigmine (MESTINON) 60 MG tablet     No current facility-administered medications for this visit.     Review of Systems: A review of systems was obtained and was negative except for what was noted above.    Physical examination:    /75 (BP Location: Right arm, Patient Position: Sitting, Cuff Size: Adult Regular)   Pulse 62   Temp 97  F (36.1  C) (Temporal)   Wt 63.6 kg (140 lb 3.2 oz)   SpO2 100%   BMI 21.96 kg/m      General Appearance: NAD    Skin: There are no rashes or other skin lesions.     Neurologic examination:     Mental status:  Patient is alert, attentive, and oriented x 3.  Language is coherent and fluent without aphasia.  Memory, comprehension and ability to follow commands were intact.        Cranial nerves: No ptosis or diplopia at baseline, even after 60 seconds upgaze and lateral gaze. Eye closure strength is full. No  dysarthria. Face strong and symmetric.      Motor exam: No atrophy or fasciculations.   Manual muscle testing revealed the following MRC grade muscle power:       Right Left   Neck flexion 5     Neck extension: 5     Shoulder abduction:        5 5   Elbow extension: 5 5   Elbow flexion:            5 5   Wrist flexion:            5 5   Wrist extension:        5 5   FDI 5 5   Hip flexion 5 5   Knee extension 5 5   Dorsiflexion 5 5   Plantar flexion 5 5      Complex motor skills: No tremor or ataxia     Sensory exam: Normal LT in hands and feet     Gait narrow and stable.     Deep tendon reflexes:    Right Left   Triceps 2 2   Biceps 2 2   Brachioradialis 2 2   Knee jerk 2 2   Ankle jerk 2 2      MG Outcomes   MG ADL MG Composite MG medications   3/10/20 0 0 Prednisone 7.5 mg daily, MMF 1500 mg/1000 mg   7/14/20 0 0 Prednisone 5 mg daily, MMF 1500 mg/1000 mg   12/1/20 0 0 Prednisone 2.5 mg every other day, MMF 1500 mg/1000 mg   3/16/21 0 0 MMF 1500 mg/1000 mg   6/22/21   MMF 1000 mg BID   9/14/21 0 0 MMF 1000 mg BID   12/7/21 0 0 MMF 1000 mg BID    6/7/22 0 0 MMF 1000 mg BID   6/29/22 4 Not performed (phone) MMF 1000 mg/500 mg   12/6/22 0 0 MMF 1000 mg BID   7/11/23 0 0 MMF 1000 mg BID   1/16/24 0 0 MMF 1000 mg BID      Assessment:    David Vera is a 63 year old man with 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 He is in pharmacologic remission on MMF monotherapy, and has no MG manifestations. Last MMF wean was about 1.5 years ago. We discussed another MMF reduction. We elected to hold off for now, but if he is stable at his next visit (2 years out from the last dose reduction), then will reconsider.  All questions answerered.    Plan:      1. Mestinon: No longer needed. Stopped 12/20.   2. Prednisone: No longer needed. Stopped 12/20.   3. Mycophenolate: Continue 1000 mg BID. If stable at next visit anticipate a small reduction, probably 1000/750 mg with a goal to taper by no faster than 250  mg every 6 months.   4. Monitoring labs: Will repeat CBC. Monitoring leukopenia.   5. If he develops inability to swallow of shortness of breath he should seek urgent medical attention and then let me know. If he develops worsening ocular symptoms, dysarthria, or limb weakness he should let me know ASAP.   6. Follow up in 6 months. Sooner if needed.   -----

## 2024-01-18 ENCOUNTER — LAB (OUTPATIENT)
Dept: LAB | Facility: CLINIC | Age: 64
End: 2024-01-18
Payer: COMMERCIAL

## 2024-01-18 DIAGNOSIS — G70.00 MYASTHENIA GRAVIS WITHOUT EXACERBATION (H): ICD-10-CM

## 2024-01-18 LAB
ERYTHROCYTE [DISTWIDTH] IN BLOOD BY AUTOMATED COUNT: 13.5 % (ref 10–15)
HCT VFR BLD AUTO: 46.3 % (ref 40–53)
HGB BLD-MCNC: 14.6 G/DL (ref 13.3–17.7)
MCH RBC QN AUTO: 30.5 PG (ref 26.5–33)
MCHC RBC AUTO-ENTMCNC: 31.5 G/DL (ref 31.5–36.5)
MCV RBC AUTO: 97 FL (ref 78–100)
PLATELET # BLD AUTO: 241 10E3/UL (ref 150–450)
RBC # BLD AUTO: 4.78 10E6/UL (ref 4.4–5.9)
WBC # BLD AUTO: 4.7 10E3/UL (ref 4–11)

## 2024-01-18 PROCEDURE — 85027 COMPLETE CBC AUTOMATED: CPT

## 2024-01-18 PROCEDURE — 36415 COLL VENOUS BLD VENIPUNCTURE: CPT

## 2024-03-17 ENCOUNTER — HEALTH MAINTENANCE LETTER (OUTPATIENT)
Age: 64
End: 2024-03-17

## 2024-03-19 DIAGNOSIS — G70.00 MYASTHENIA GRAVIS WITHOUT EXACERBATION (H): ICD-10-CM

## 2024-03-19 RX ORDER — MYCOPHENOLATE MOFETIL 500 MG/1
1000 TABLET ORAL 2 TIMES DAILY
Qty: 360 TABLET | Refills: 0 | Status: SHIPPED | OUTPATIENT
Start: 2024-03-19 | End: 2024-06-21

## 2024-06-20 DIAGNOSIS — G70.00 MYASTHENIA GRAVIS WITHOUT EXACERBATION (H): ICD-10-CM

## 2024-06-21 RX ORDER — MYCOPHENOLATE MOFETIL 500 MG/1
1000 TABLET ORAL 2 TIMES DAILY
Qty: 360 TABLET | Refills: 0 | Status: SHIPPED | OUTPATIENT
Start: 2024-06-21

## 2024-07-16 ENCOUNTER — OFFICE VISIT (OUTPATIENT)
Dept: NEUROLOGY | Facility: CLINIC | Age: 64
End: 2024-07-16
Payer: COMMERCIAL

## 2024-07-16 VITALS
HEIGHT: 67 IN | OXYGEN SATURATION: 99 % | TEMPERATURE: 97.7 F | WEIGHT: 133 LBS | DIASTOLIC BLOOD PRESSURE: 66 MMHG | HEART RATE: 70 BPM | BODY MASS INDEX: 20.88 KG/M2 | SYSTOLIC BLOOD PRESSURE: 108 MMHG

## 2024-07-16 DIAGNOSIS — G70.00 MYASTHENIA GRAVIS WITHOUT EXACERBATION (H): Primary | ICD-10-CM

## 2024-07-16 NOTE — LETTER
7/16/2024       RE: David Vera  [address] [place] [address]     Dear Colleague,    Thank you for referring your patient, David Vera, to the  [facility] at [facility]. Please see a copy of my visit note below.    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 In August 2017 he developed drooping of the left eyelid. In August he developed diplopia. In 11/17 he developed limb weakness. No shortness of breath. In early 11/17 he started mestinon, which helped ptosis. In mid 11/17 he started prednisone: 10 mg daily and then increasing to 20 mg daily after about a week. After prednisone was started he reported less eyelid ptosis and overall improved strength - but not normal. He increased to 40 mg in early 2018. In 12/17 he also started mycophenolate. Prednisone was slowly tapered to 20 mg daily in 7/2018. By 9/1/18 he was at prednisone 10 mg daily. In 11/18 he developed some recurrence of diplopia and so we held at 10 mg for a couple months. Diplopia resolved. In the spring 2019 he reduced prednisone to 7.5 mg daily and developed diplopia. In 6/19 predniosne was increased back to 10 mg daily and mycophenolate to 2500 mg daily. In 12/19 he reduced pred to 10 alternating with 7.5 and in late 1/20 he reduced to 7.5 mg daily. In 3/20 reduced to 5 mg daily. Through 2020 he continued to slowly taper, by 12/20 he was down to prednisone 2.5 mg every other day, and later that month he stopped prednisone altogether. In 2/21 WBC counted noted to be 2.9. In 3/21 he reduced MMF to 1000 mg BID. WBC later in 3/21 4.2, in 6/21 it was 3.2 and 12/21 was 4.3. In 6/22 we reduced MMF to 1000/500 - he worsened (MG-ADL went from 0 to 4). MMF increase back to 1000 mg BID and he improved. In 2023 and 2024 he was in minimal manifestations or better.    Interval history:  I last  saw him in clinic 24. He continues to do very well. No relapses or slow deteriorations. He remains very active: rides bike, lifts weights in the summer. He is completely symptom from from MG. No dysphagia, diplopia, ptosis, shortness of breath at rest or with exertion. His MMF remains 1000 mg BID.     Prior pertinent laboratory work-up:  : AChR binding ab 0.7 nmol/L  (N < 0.4). TSH normal.   19: Normal CBC  3/11/2020 Normal CBC  : WBC 3.8  20: WBC 4.6  21: WBC 2.9  3/19/21: WBC 4.2.   21: WBC 3.2. Neutrophil 1.6 (Normal 1.7-7).   9/3/21: WBC 3.7, neutrophil 1.8   : Normal CBC    Prior imagin/17: CT chest showed no evidence of thymoma. No mediastinal mass or lymphadenopathy.    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 Decrement was about 40% at the facial-nasalis.      Past Medical History:   Myasthenia gravis    Past Surgical History:  Sinus surgery    Family history:    There is no known family history of myopathy or other neuromuscular disorders.    Social History:    He denies tobacco, alcohol, or illicit drug use.     Medical Allergies:  NKDA     Current Medications:    Current Outpatient Medications   Medication Sig Dispense Refill     mycophenolate (GENERIC EQUIVALENT) 500 MG tablet TAKE TWO TABLETS BY MOUTH TWICE DAILY 360 tablet 0     CELLCEPT (BRAND) 500 MG TABLET Take 2 tablets (1,000 mg) by mouth 2 times daily (Patient not taking: Reported on 3/10/2020) 120 tablet 3     mycophenolate (GENERIC EQUIVALENT) 500 MG tablet TAKE ONE TABLET IN THE MORNING AND 2 TABLETS AT NIGHT 90 tablet 2     predniSONE (DELTASONE) 5 MG tablet Take 5mg/2.5mg alternating days for 30 days. Then  2.5 mg daily for 30 days. Then 2.5mg/0 for 30 days. Then stop. (Patient not taking: Reported on 3/16/2021) 60 tablet 0     pyridostigmine (MESTINON) 60 MG tablet TAKE 1 TABLET  "BY MOUTH 5 TIMES DAILY (Patient not taking: Daily) 150 tablet 5     No current facility-administered medications for this visit.     Review of Systems: A review of systems was obtained and was negative except for what was noted above.    Physical examination:    /66   Pulse 70   Temp 97.7  F (36.5  C) (Temporal)   Ht 1.702 m (5' 7\")   Wt 60.3 kg (133 lb)   SpO2 99%   BMI 20.83 kg/m      General Appearance: NAD    Skin: There are no rashes or other skin lesions.     Neurologic examination:     Mental status:  Patient is alert, attentive, and oriented x 3.  Language is coherent and fluent without aphasia.  Memory, comprehension and ability to follow commands were intact.        Cranial nerves: No ptosis or diplopia at baseline. Very mild right ptosis after 60 seconds of upgaze. Eye closure strength is full. No dysarthria. Face strong and symmetric.      Motor exam: No atrophy or fasciculations.   Manual muscle testing revealed the following MRC grade muscle power:       Right Left   Neck flexion 5     Neck extension: 5     Shoulder abduction:        5 5   Elbow extension: 5 5   Elbow flexion:            5 5   Wrist flexion:            5 5   Wrist extension:        5 5   FDI 5 5   Hip flexion 5 5   Knee extension 5 5   Dorsiflexion 5 5   Plantar flexion 5 5      Complex motor skills: No tremor or ataxia     Sensory exam: Normal LT in hands and feet     Gait narrow and stable.     Deep tendon reflexes:    Right Left   Triceps 2 2   Biceps 2 2   Brachioradialis 2 2   Knee jerk 2 2   Ankle jerk 2 2      MG Outcomes   MG ADL MG Composite MG medications   3/10/20 0 0 Prednisone 7.5 mg daily, MMF 1500 mg/1000 mg   7/14/20 0 0 Prednisone 5 mg daily, MMF 1500 mg/1000 mg   12/1/20 0 0 Prednisone 2.5 mg every other day, MMF 1500 mg/1000 mg   3/16/21 0 0 MMF 1500 mg/1000 mg   6/22/21   MMF 1000 mg BID   9/14/21 0 0 MMF 1000 mg BID   12/7/21 0 0 MMF 1000 mg BID    6/7/22 0 0 MMF 1000 mg BID   6/29/22 4 Not performed " (phone) MMF 1000 mg/500 mg   12/6/22 0 0 MMF 1000 mg BID   7/11/23 0 0 MMF 1000 mg BID   1/16/24 0 0 MMF 1000 mg BID   7/16/24 0 1 MMF 1000 mg BID      Assessment:    David Vera is a 63 year old man with 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 He is in pharmacologic remission on MMF monotherapy. Presently no MG symptoms and only very minimal signs. His last MMF wean was 2 years ago. We discussed another MMF reduction, principally to reduce the potential increasing in lymphoma risk. We agreed to hold off for now, but if he remains stable (as I anticipate he will be) through 2024 then we will make a very small MMF drop in early 2025. All questions answerered.    Plan:      1. Mestinon: No longer needed. Stopped 12/20.   2. Prednisone: No longer needed. Stopped 12/20.   3. Mycophenolate: Continue 1000 mg BID. No changes today, but we agreed that if he is stable at the next visit will reduce to 1000/750 mg with a goal to taper by no faster than 250 mg every 6 months.   4. Monitoring labs: Following CBC. Monitoring leukopenia.   5. If he develops inability to swallow of shortness of breath he should seek urgent medical attention and then let me know. If he develops worsening ocular symptoms, dysarthria, or limb weakness he should let me know ASAP.   6. Follow up in 6 months. Sooner if needed.   -----          Again, thank you for allowing me to participate in the care of your patient.      Sincerely,    Go Jewell MD

## 2024-07-16 NOTE — PROGRESS NOTES
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 In August 2017 he developed drooping of the left eyelid. In August he developed diplopia. In 11/17 he developed limb weakness. No shortness of breath. In early 11/17 he started mestinon, which helped ptosis. In mid 11/17 he started prednisone: 10 mg daily and then increasing to 20 mg daily after about a week. After prednisone was started he reported less eyelid ptosis and overall improved strength - but not normal. He increased to 40 mg in early 2018. In 12/17 he also started mycophenolate. Prednisone was slowly tapered to 20 mg daily in 7/2018. By 9/1/18 he was at prednisone 10 mg daily. In 11/18 he developed some recurrence of diplopia and so we held at 10 mg for a couple months. Diplopia resolved. In the spring 2019 he reduced prednisone to 7.5 mg daily and developed diplopia. In 6/19 predniosne was increased back to 10 mg daily and mycophenolate to 2500 mg daily. In 12/19 he reduced pred to 10 alternating with 7.5 and in late 1/20 he reduced to 7.5 mg daily. In 3/20 reduced to 5 mg daily. Through 2020 he continued to slowly taper, by 12/20 he was down to prednisone 2.5 mg every other day, and later that month he stopped prednisone altogether. In 2/21 WBC counted noted to be 2.9. In 3/21 he reduced MMF to 1000 mg BID. WBC later in 3/21 4.2, in 6/21 it was 3.2 and 12/21 was 4.3. In 6/22 we reduced MMF to 1000/500 - he worsened (MG-ADL went from 0 to 4). MMF increase back to 1000 mg BID and he improved. In 2023 and 2024 he was in minimal manifestations or better.    Interval history:  I last saw him in clinic 1/16/24. He continues to do very well. No relapses or slow deteriorations. He remains very active: rides bike, lifts weights in the summer. He is completely symptom from from MG. No dysphagia, diplopia, ptosis, shortness of breath at rest or with exertion. His MMF remains 1000 mg BID.      Prior pertinent laboratory work-up:  : AChR binding ab 0.7 nmol/L  (N < 0.4). TSH normal.   19: Normal CBC  3/11/2020 Normal CBC  : WBC 3.8  20: WBC 4.6  21: WBC 2.9  3/19/21: WBC 4.2.   21: WBC 3.2. Neutrophil 1.6 (Normal 1.7-7).   9/3/21: WBC 3.7, neutrophil 1.8   : Normal CBC    Prior imagin/17: CT chest showed no evidence of thymoma. No mediastinal mass or lymphadenopathy.    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 Decrement was about 40% at the facial-nasalis.      Past Medical History:   Myasthenia gravis    Past Surgical History:  Sinus surgery    Family history:    There is no known family history of myopathy or other neuromuscular disorders.    Social History:    He denies tobacco, alcohol, or illicit drug use.     Medical Allergies:  NKDA     Current Medications:    Current Outpatient Medications   Medication Sig Dispense Refill    mycophenolate (GENERIC EQUIVALENT) 500 MG tablet TAKE TWO TABLETS BY MOUTH TWICE DAILY 360 tablet 0    CELLCEPT (BRAND) 500 MG TABLET Take 2 tablets (1,000 mg) by mouth 2 times daily (Patient not taking: Reported on 3/10/2020) 120 tablet 3    mycophenolate (GENERIC EQUIVALENT) 500 MG tablet TAKE ONE TABLET IN THE MORNING AND 2 TABLETS AT NIGHT 90 tablet 2    predniSONE (DELTASONE) 5 MG tablet Take 5mg/2.5mg alternating days for 30 days. Then  2.5 mg daily for 30 days. Then 2.5mg/0 for 30 days. Then stop. (Patient not taking: Reported on 3/16/2021) 60 tablet 0    pyridostigmine (MESTINON) 60 MG tablet TAKE 1 TABLET BY MOUTH 5 TIMES DAILY (Patient not taking: Daily) 150 tablet 5     No current facility-administered medications for this visit.     Review of Systems: A review of systems was obtained and was negative except for what was noted above.    Physical examination:    /66   Pulse 70   Temp 97.7  F (36.5  " C) (Temporal)   Ht 1.702 m (5' 7\")   Wt 60.3 kg (133 lb)   SpO2 99%   BMI 20.83 kg/m      General Appearance: NAD    Skin: There are no rashes or other skin lesions.     Neurologic examination:     Mental status:  Patient is alert, attentive, and oriented x 3.  Language is coherent and fluent without aphasia.  Memory, comprehension and ability to follow commands were intact.        Cranial nerves: No ptosis or diplopia at baseline. Very mild right ptosis after 60 seconds of upgaze. Eye closure strength is full. No dysarthria. Face strong and symmetric.      Motor exam: No atrophy or fasciculations.   Manual muscle testing revealed the following MRC grade muscle power:       Right Left   Neck flexion 5     Neck extension: 5     Shoulder abduction:        5 5   Elbow extension: 5 5   Elbow flexion:            5 5   Wrist flexion:            5 5   Wrist extension:        5 5   FDI 5 5   Hip flexion 5 5   Knee extension 5 5   Dorsiflexion 5 5   Plantar flexion 5 5      Complex motor skills: No tremor or ataxia     Sensory exam: Normal LT in hands and feet     Gait narrow and stable.     Deep tendon reflexes:    Right Left   Triceps 2 2   Biceps 2 2   Brachioradialis 2 2   Knee jerk 2 2   Ankle jerk 2 2      MG Outcomes   MG ADL MG Composite MG medications   3/10/20 0 0 Prednisone 7.5 mg daily, MMF 1500 mg/1000 mg   7/14/20 0 0 Prednisone 5 mg daily, MMF 1500 mg/1000 mg   12/1/20 0 0 Prednisone 2.5 mg every other day, MMF 1500 mg/1000 mg   3/16/21 0 0 MMF 1500 mg/1000 mg   6/22/21   MMF 1000 mg BID   9/14/21 0 0 MMF 1000 mg BID   12/7/21 0 0 MMF 1000 mg BID    6/7/22 0 0 MMF 1000 mg BID   6/29/22 4 Not performed (phone) MMF 1000 mg/500 mg   12/6/22 0 0 MMF 1000 mg BID   7/11/23 0 0 MMF 1000 mg BID   1/16/24 0 0 MMF 1000 mg BID   7/16/24 0 1 MMF 1000 mg BID      Assessment:    David Vera is a 63 year old man with 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 He is in pharmacologic remission on " MMF monotherapy. Presently no MG symptoms and only very minimal signs. His last MMF wean was 2 years ago. We discussed another MMF reduction, principally to reduce the potential increasing in lymphoma risk. We agreed to hold off for now, but if he remains stable (as I anticipate he will be) through 2024 then we will make a very small MMF drop in early 2025. All questions answerered.    Plan:      1. Mestinon: No longer needed. Stopped 12/20.   2. Prednisone: No longer needed. Stopped 12/20.   3. Mycophenolate: Continue 1000 mg BID. No changes today, but we agreed that if he is stable at the next visit will reduce to 1000/750 mg with a goal to taper by no faster than 250 mg every 6 months.   4. Monitoring labs: Following CBC. Monitoring leukopenia.   5. If he develops inability to swallow of shortness of breath he should seek urgent medical attention and then let me know. If he develops worsening ocular symptoms, dysarthria, or limb weakness he should let me know ASAP.   6. Follow up in 6 months. Sooner if needed.   -----

## 2024-09-18 DIAGNOSIS — G70.00 MYASTHENIA GRAVIS WITHOUT EXACERBATION (H): Primary | ICD-10-CM

## 2024-09-18 RX ORDER — MYCOPHENOLATE MOFETIL 500 MG/1
1000 TABLET ORAL 2 TIMES DAILY
Qty: 360 TABLET | Refills: 0 | Status: SHIPPED | OUTPATIENT
Start: 2024-09-18

## 2024-12-15 DIAGNOSIS — G70.00 MYASTHENIA GRAVIS WITHOUT EXACERBATION (H): ICD-10-CM

## 2024-12-16 RX ORDER — MYCOPHENOLATE MOFETIL 500 MG/1
1000 TABLET ORAL 2 TIMES DAILY
Qty: 360 TABLET | Refills: 0 | Status: SHIPPED | OUTPATIENT
Start: 2024-12-16

## 2025-01-21 ENCOUNTER — OFFICE VISIT (OUTPATIENT)
Dept: NEUROLOGY | Facility: CLINIC | Age: 65
End: 2025-01-21
Payer: COMMERCIAL

## 2025-01-21 VITALS
TEMPERATURE: 97.1 F | SYSTOLIC BLOOD PRESSURE: 119 MMHG | OXYGEN SATURATION: 100 % | HEART RATE: 72 BPM | DIASTOLIC BLOOD PRESSURE: 73 MMHG

## 2025-01-21 DIAGNOSIS — G70.00 MYASTHENIA GRAVIS WITHOUT EXACERBATION (H): Primary | ICD-10-CM

## 2025-01-21 LAB
ERYTHROCYTE [DISTWIDTH] IN BLOOD BY AUTOMATED COUNT: 13.4 % (ref 10–15)
HCT VFR BLD AUTO: 44.8 % (ref 40–53)
HGB BLD-MCNC: 14.5 G/DL (ref 13.3–17.7)
MCH RBC QN AUTO: 30.9 PG (ref 26.5–33)
MCHC RBC AUTO-ENTMCNC: 32.4 G/DL (ref 31.5–36.5)
MCV RBC AUTO: 95 FL (ref 78–100)
PLATELET # BLD AUTO: 267 10E3/UL (ref 150–450)
RBC # BLD AUTO: 4.7 10E6/UL (ref 4.4–5.9)
WBC # BLD AUTO: 4.5 10E3/UL (ref 4–11)

## 2025-01-21 NOTE — PROGRESS NOTES
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 In August 2017 he developed drooping of the left eyelid. In August he developed diplopia. In 11/17 he developed limb weakness. No shortness of breath. In early 11/17 he started mestinon, which helped ptosis. In mid 11/17 he started prednisone: 10 mg daily and then increasing to 20 mg daily after about a week. After prednisone was started he reported less eyelid ptosis and overall improved strength - but not normal. He increased to 40 mg in early 2018. In 12/17 he also started mycophenolate. Prednisone was slowly tapered to 20 mg daily in 7/2018. By 9/1/18 he was at prednisone 10 mg daily. In 11/18 he developed some recurrence of diplopia and so we held at 10 mg for a couple months. Diplopia resolved. In the spring 2019 he reduced prednisone to 7.5 mg daily and developed diplopia. In 6/19 predniosne was increased back to 10 mg daily and mycophenolate to 2500 mg daily. In 12/19 he reduced pred to 10 alternating with 7.5 and in late 1/20 he reduced to 7.5 mg daily. In 3/20 reduced to 5 mg daily. Through 2020 he continued to slowly taper, by 12/20 he was down to prednisone 2.5 mg every other day, and later that month he stopped prednisone altogether. In 2/21 WBC counted noted to be 2.9. In 3/21 he reduced MMF to 1000 mg BID. WBC later in 3/21 4.2, in 6/21 it was 3.2 and 12/21 was 4.3. In 6/22 we reduced MMF to 1000/500 - he worsened (MG-ADL went from 0 to 4). MMF increase back to 1000 mg BID and he improved. In 2023 and 2024 he was in minimal manifestations or better.    Interval history:  I last saw him in clinic 7/16/24.  He has done well over the last several months. He remains MG symptom free. Recently he had an URI, and even through that he had no MG symptoms. No dysphagia, diplopia, ptosis, shortness of breath at rest or with exertion. He remains active: rides bike, lifts weights. His MMF  remains 1000 mg BID.     Prior pertinent laboratory work-up:  : AChR binding ab 0.7 nmol/L  (N < 0.4). TSH normal.   19: Normal CBC  3/11/2020 Normal CBC  : WBC 3.8  20: WBC 4.6  21: WBC 2.9  3/19/21: WBC 4.2.   21: WBC 3.2. Neutrophil 1.6 (Normal 1.7-7).   9/3/21: WBC 3.7, neutrophil 1.8   : Normal CBC    Prior imagin/17: CT chest showed no evidence of thymoma. No mediastinal mass or lymphadenopathy.    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 Decrement was about 40% at the facial-nasalis.      Past Medical History:   Myasthenia gravis    Past Surgical History:  Sinus surgery    Family history:    There is no known family history of myopathy or other neuromuscular disorders.    Social History:    He denies tobacco, alcohol, or illicit drug use.     Medical Allergies:  NKDA     Current Medications:    Current Outpatient Medications   Medication Sig Dispense Refill    mycophenolate (GENERIC EQUIVALENT) 500 MG tablet TAKE 2 TABLETS BY MOUTH 2 TIMES DAILY 360 tablet 0    CELLCEPT (BRAND) 500 MG TABLET Take 2 tablets (1,000 mg) by mouth 2 times daily (Patient not taking: Reported on 3/10/2020) 120 tablet 3    mycophenolate (GENERIC EQUIVALENT) 500 MG tablet TAKE TWO TABLETS BY MOUTH TWICE DAILY 360 tablet 0    mycophenolate (GENERIC EQUIVALENT) 500 MG tablet TAKE ONE TABLET IN THE MORNING AND 2 TABLETS AT NIGHT 90 tablet 2    predniSONE (DELTASONE) 5 MG tablet Take 5mg/2.5mg alternating days for 30 days. Then  2.5 mg daily for 30 days. Then 2.5mg/0 for 30 days. Then stop. (Patient not taking: Reported on 3/16/2021) 60 tablet 0    pyridostigmine (MESTINON) 60 MG tablet TAKE 1 TABLET BY MOUTH 5 TIMES DAILY (Patient not taking: Daily) 150 tablet 5     No current facility-administered medications for this visit.     Review of Systems: A review of systems was obtained  and was negative except for what was noted above.    Physical examination:    /73   Pulse 72   Temp 97.1  F (36.2  C)   SpO2 100%     General Appearance: NAD    Skin: There are no rashes or other skin lesions.     Neurologic examination:     Mental status:  Patient is alert, attentive, and oriented x 3.  Language is coherent and fluent without aphasia.  Memory, comprehension and ability to follow commands were intact.        Cranial nerves: No ptosis or diplopia at baseline. Again very mild right > left ptosis after 60 seconds of upgaze. Eye closure strength is full. No dysarthria. Face strong and symmetric.      Motor exam: No atrophy or fasciculations.   Manual muscle testing revealed the following MRC grade muscle power:       Right Left   Neck flexion 5     Neck extension: 5     Shoulder abduction:        5 5   Elbow extension: 5 5   Elbow flexion:            5 5   Wrist flexion:            5 5   Wrist extension:        5 5   FDI 5 5   Hip flexion 5 5   Knee extension 5 5   Dorsiflexion 5 5   Plantar flexion 5 5      Complex motor skills: No tremor or ataxia     Sensory exam: Normal LT in hands and feet     Gait narrow and stable.     Deep tendon reflexes:    Right Left   Triceps 2 2   Biceps 2 2   Brachioradialis 2 2   Knee jerk 2 2   Ankle jerk 2 2      MG Outcomes   MG ADL MG Composite MG medications   3/10/20 0 0 Prednisone 7.5 mg daily, MMF 1500 mg/1000 mg   7/14/20 0 0 Prednisone 5 mg daily, MMF 1500 mg/1000 mg   12/1/20 0 0 Prednisone 2.5 mg every other day, MMF 1500 mg/1000 mg   3/16/21 0 0 MMF 1500 mg/1000 mg   6/22/21   MMF 1000 mg BID   9/14/21 0 0 MMF 1000 mg BID   12/7/21 0 0 MMF 1000 mg BID    6/7/22 0 0 MMF 1000 mg BID   6/29/22 4 Not performed (phone) MMF 1000 mg/500 mg   12/6/22 0 0 MMF 1000 mg BID   7/11/23 0 0 MMF 1000 mg BID   1/16/24 0 0 MMF 1000 mg BID   7/16/24 0 1 MMF 1000 mg BID   1/21/25 0 1 MMF 1000 mg BID      Assessment:    David Vera is a 64 year old man with ACHR ab  positive non-thymomatous generalized myasthenia gravis. Remains in pharmacologic remission on MMF monotherapy with no or minimal manifestations. His last MMF wean was about 2.5 years ago. We discussed another MMF reduction, principally to reduce the potential increasing in lymphoma risk. He is open to it, but would like to hold off for now. When we meet next we will discuss again. All questions answerered.    Plan:      1. Mestinon: No longer needed. Stopped 12/20.   2. Prednisone: No longer needed. Stopped 12/20.   3. Mycophenolate: Continue 1000 mg BID. No changes today. If/when he is agreeable to a reduction anticipate reducing to 1000/750 mg with a goal to taper by no faster than 250 mg every 6 months.   4. Monitoring labs: CBC today. Monitoring leukopenia.   5. If he develops inability to swallow of shortness of breath he should seek urgent medical attention and then let me know. If he develops worsening ocular symptoms, dysarthria, or limb weakness he should let me know ASAP.   6. Follow up in 6 months. Sooner if needed.   -----

## 2025-01-21 NOTE — LETTER
1/21/2025       RE: David Vera  [address] [place] [address]     Dear Colleague,    Thank you for referring your patient, David Vera, to the  [facility] at [facility]. Please see a copy of my visit note below.    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 In August 2017 he developed drooping of the left eyelid. In August he developed diplopia. In 11/17 he developed limb weakness. No shortness of breath. In early 11/17 he started mestinon, which helped ptosis. In mid 11/17 he started prednisone: 10 mg daily and then increasing to 20 mg daily after about a week. After prednisone was started he reported less eyelid ptosis and overall improved strength - but not normal. He increased to 40 mg in early 2018. In 12/17 he also started mycophenolate. Prednisone was slowly tapered to 20 mg daily in 7/2018. By 9/1/18 he was at prednisone 10 mg daily. In 11/18 he developed some recurrence of diplopia and so we held at 10 mg for a couple months. Diplopia resolved. In the spring 2019 he reduced prednisone to 7.5 mg daily and developed diplopia. In 6/19 predniosne was increased back to 10 mg daily and mycophenolate to 2500 mg daily. In 12/19 he reduced pred to 10 alternating with 7.5 and in late 1/20 he reduced to 7.5 mg daily. In 3/20 reduced to 5 mg daily. Through 2020 he continued to slowly taper, by 12/20 he was down to prednisone 2.5 mg every other day, and later that month he stopped prednisone altogether. In 2/21 WBC counted noted to be 2.9. In 3/21 he reduced MMF to 1000 mg BID. WBC later in 3/21 4.2, in 6/21 it was 3.2 and 12/21 was 4.3. In 6/22 we reduced MMF to 1000/500 - he worsened (MG-ADL went from 0 to 4). MMF increase back to 1000 mg BID and he improved. In 2023 and 2024 he was in minimal manifestations or better.    Interval history:  I last  saw him in clinic 24.  He has done well over the last several months. He remains MG symptom free. Recently he had an URI, and even through that he had no MG symptoms. No dysphagia, diplopia, ptosis, shortness of breath at rest or with exertion. He remains active: rides bike, lifts weights. His MMF remains 1000 mg BID.     Prior pertinent laboratory work-up:  : AChR binding ab 0.7 nmol/L  (N < 0.4). TSH normal.   19: Normal CBC  3/11/2020 Normal CBC  : WBC 3.8  20: WBC 4.6  21: WBC 2.9  3/19/21: WBC 4.2.   21: WBC 3.2. Neutrophil 1.6 (Normal 1.7-7).   9/3/21: WBC 3.7, neutrophil 1.8   : Normal CBC    Prior imagin/17: CT chest showed no evidence of thymoma. No mediastinal mass or lymphadenopathy.    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 Decrement was about 40% at the facial-nasalis.      Past Medical History:   Myasthenia gravis    Past Surgical History:  Sinus surgery    Family history:    There is no known family history of myopathy or other neuromuscular disorders.    Social History:    He denies tobacco, alcohol, or illicit drug use.     Medical Allergies:  NKDA     Current Medications:    Current Outpatient Medications   Medication Sig Dispense Refill     mycophenolate (GENERIC EQUIVALENT) 500 MG tablet TAKE 2 TABLETS BY MOUTH 2 TIMES DAILY 360 tablet 0     CELLCEPT (BRAND) 500 MG TABLET Take 2 tablets (1,000 mg) by mouth 2 times daily (Patient not taking: Reported on 3/10/2020) 120 tablet 3     mycophenolate (GENERIC EQUIVALENT) 500 MG tablet TAKE TWO TABLETS BY MOUTH TWICE DAILY 360 tablet 0     mycophenolate (GENERIC EQUIVALENT) 500 MG tablet TAKE ONE TABLET IN THE MORNING AND 2 TABLETS AT NIGHT 90 tablet 2     predniSONE (DELTASONE) 5 MG tablet Take 5mg/2.5mg alternating days for 30 days. Then  2.5 mg daily for 30 days. Then 2.5mg/0 for 30  days. Then stop. (Patient not taking: Reported on 3/16/2021) 60 tablet 0     pyridostigmine (MESTINON) 60 MG tablet TAKE 1 TABLET BY MOUTH 5 TIMES DAILY (Patient not taking: Daily) 150 tablet 5     No current facility-administered medications for this visit.     Review of Systems: A review of systems was obtained and was negative except for what was noted above.    Physical examination:    /73   Pulse 72   Temp 97.1  F (36.2  C)   SpO2 100%     General Appearance: NAD    Skin: There are no rashes or other skin lesions.     Neurologic examination:     Mental status:  Patient is alert, attentive, and oriented x 3.  Language is coherent and fluent without aphasia.  Memory, comprehension and ability to follow commands were intact.        Cranial nerves: No ptosis or diplopia at baseline. Again very mild right > left ptosis after 60 seconds of upgaze. Eye closure strength is full. No dysarthria. Face strong and symmetric.      Motor exam: No atrophy or fasciculations.   Manual muscle testing revealed the following MRC grade muscle power:       Right Left   Neck flexion 5     Neck extension: 5     Shoulder abduction:        5 5   Elbow extension: 5 5   Elbow flexion:            5 5   Wrist flexion:            5 5   Wrist extension:        5 5   FDI 5 5   Hip flexion 5 5   Knee extension 5 5   Dorsiflexion 5 5   Plantar flexion 5 5      Complex motor skills: No tremor or ataxia     Sensory exam: Normal LT in hands and feet     Gait narrow and stable.     Deep tendon reflexes:    Right Left   Triceps 2 2   Biceps 2 2   Brachioradialis 2 2   Knee jerk 2 2   Ankle jerk 2 2      MG Outcomes   MG ADL MG Composite MG medications   3/10/20 0 0 Prednisone 7.5 mg daily, MMF 1500 mg/1000 mg   7/14/20 0 0 Prednisone 5 mg daily, MMF 1500 mg/1000 mg   12/1/20 0 0 Prednisone 2.5 mg every other day, MMF 1500 mg/1000 mg   3/16/21 0 0 MMF 1500 mg/1000 mg   6/22/21   MMF 1000 mg BID   9/14/21 0 0 MMF 1000 mg BID   12/7/21 0 0 MMF  1000 mg BID    6/7/22 0 0 MMF 1000 mg BID   6/29/22 4 Not performed (phone) MMF 1000 mg/500 mg   12/6/22 0 0 MMF 1000 mg BID   7/11/23 0 0 MMF 1000 mg BID   1/16/24 0 0 MMF 1000 mg BID   7/16/24 0 1 MMF 1000 mg BID   1/21/25 0 1 MMF 1000 mg BID      Assessment:    David Vera is a 64 year old man with 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 Remains in pharmacologic remission on MMF monotherapy with no or minimal manifestations. His last MMF wean was about 2.5 years ago. We discussed another MMF reduction, principally to reduce the potential increasing in lymphoma risk. He is open to it, but would like to hold off for now. When we meet next we will discuss again. All questions answerered.    Plan:      1. Mestinon: No longer needed. Stopped 12/20.   2. Prednisone: No longer needed. Stopped 12/20.   3. Mycophenolate: Continue 1000 mg BID. No changes today. If/when he is agreeable to a reduction anticipate reducing to 1000/750 mg with a goal to taper by no faster than 250 mg every 6 months.   4. Monitoring labs: CBC today. Monitoring leukopenia.   5. If he develops inability to swallow of shortness of breath he should seek urgent medical attention and then let me know. If he develops worsening ocular symptoms, dysarthria, or limb weakness he should let me know ASAP.   6. Follow up in 6 months. Sooner if needed.   -----          Again, thank you for allowing me to participate in the care of your patient.      Sincerely,    Go Jewell MD

## 2025-02-15 NOTE — PROGRESS NOTES
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 In August 2017 he developed drooping of the left eyelid. In August he developed diplopia. In 11/17 he developed limb weakness. No shortness of breath. In early 11/17 he started mestinon, which helped ptosis. In mid 11/17 he started prednisone: 10 mg daily and then increasing to 20 mg daily after about a week. After prednisone was started he reported less eyelid ptosis and overall improved strength - but not normal. He increased to 40 mg in early 2018. In 12/17 he also started mycophenolate. Prednisone was slowly tapered to 20 mg daily in 7/2018. By 9/1/18 he was at prednisone 10 mg daily. In 11/18 he developed some recurrence of diplopia and so we held at 10 mg for a couple months. Diplopia resolved. In the spring 2019 he reduced prednisone to 7.5 mg daily and developed diplopia. In 6/19 predniosne was increased back to 10 mg daily and mycophenolate to 2500 mg daily. In 12/19 he reduced pred to 10 alternating with 7.5 and in late 1/20 he reduced to 7.5 mg daily. In 3/20 reduced to 5 mg daily.     Interval history:  We last saw him in clinic on 3/10/2020.  He has done well since his last visit. He has been able to reduce prednisone from 7.5 to 5 mg. He has been at that dose for about 3 months.  He would like to try to keep going down on the prednisone if possible.  He reports previous weight gain attributable to prednisone (when on ~30 mg) but no current side effects. He reports no diplopia, ptosis, dysarthria, dysphagia, shortness of breath or limb weakness. He continues to bicycle frequently.  He can bike from [place] to [facility] with ease.   He continues mycophenolate 2500 mg daily. Denies recent infections or feeling unwell. Denies known side effects to the mycophenolate.   He continues to take mestinon 60 mg BID as well.  He does not miss doses with regularity but when he does  miss a dose he doesn't feel more symptoms.   Denies diarrhea or other side effects.    Prior pertinent laboratory work-up:  : AChR binding ab 0.7 nmol/L  (N < 0.4). TSH normal.   19: Normal CBC  3/11/2020 Normal CBC    Prior imagin/17: CT chest showed no evidence of thymoma. No mediastinal mass or lymphadenopathy.    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 Decrement was about 40% at the facial-nasalis.      Past Medical History:   Myasthenia gravis    Past Surgical History:  Sinus surgery    Family history:    There is no known family history of myopathy or other neuromuscular disorders.    Social History:    He denies tobacco, alcohol, or illicit drug use.     Medical Allergies:  NKDA     Current Medications:    Current Outpatient Medications   Medication     mycophenolate (GENERIC EQUIVALENT) 500 MG tablet     predniSONE (DELTASONE) 5 MG tablet     CELLCEPT (BRAND) 500 MG TABLET     pyridostigmine (MESTINON) 60 MG tablet     No current facility-administered medications for this visit.      Review of Systems: A review of systems was obtained and was negative except for what was noted above.    Physical examination:      General Appearance: NAD    Skin: There are no rashes or other skin lesions.    Musculoskeletal:  No scoliosis, lordosis, kyphosis, pes cavus, or hammertoes.    Neurologic examination:    Mental status:  Patient is alert, attentive, and oriented x 3.  Language is coherent and fluent without aphasia.  Memory, comprehension and ability to follow commands were intact.       Cranial nerves: No ptosis or diplopia at baseline. After 60 seconds of upgaze the slightest bit of eyelid ptosis emerges. Eye closure strength is full. No dysarthria. Face and tongue strong.     Motor exam: No atrophy or fasciculations.   Manual muscle testing revealed the following MRC grade  muscle power:     Right Left   Neck flexion 5    Neck extension: 5    Shoulder abduction:  5 5   Elbow extension: 5 5   Elbow flexion:  5 5   Wrist flexion:  5 5   Wrist extension:  5 5   FDI 5 5   Hip flexion 5 5   Knee extension 5 5   Dorsiflexion 5 5   Plantar flexion 5 5     Complex motor skills: No tremor or ataxia     Sensory exam: Normal vibration and pin in hands and feet    Gait narrow and stable.     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 He continues to do well and has tolerated prednisone tapering. He reamins in pharmaceutical induced remission. He has no symptoms and only minimal signs of MG. Exam as able is stable.  Will continue very slow prednisone tapering as below.     Plan:      1. Mestinon: Continue 60 mg BID as needed. He wean off as tolerated.   2. Prednisone: Reduce from 5 mg daily to 5/2.5 mg on alternating days x 1 month, then 2.5 mg daily x 1 month, then 2.5 mg/0 alternating x 1 month, then stop prednisone (all as tolerated).   3. He can discontinue prednisone collateral therapy  4. Continue mycophenolate from 1500/1000 mg (total dose 2500 mg daily).   5. Monitoring labs: WBC count from 7/10/20 was slightly low at 3.8. Will need to keep an eye on this. Will repeat CBC at next visit. If WBC lower will need to reduce mycophenolate dose.   6. Discussed thymectomy. He is not interested at this time. If disease become more challenging to manage then will reconsider.  7. Covid-19 precautions: Advised him to avoid airplane travel and large crowds. Also encouraged ferquent hand washing and other hygeine measures, he expressed an  understanding.   8. If he develops inability to swallow of shortness of breath he should seek urgent medical attention and then let me know. If he develops worsening ocular symptoms, dysarthria, or limb weakness he should let me know ASAP.   9. Follow up in 3 months. Sooner if needed.     Seen with Dr. Jewell, neuromuscular attending. His addendum follows.     Daniel Martinez   PGY-5 Neurology  Clinical Neurophysiology Fellow  -----  ADDENDUM:  I personally interviewed and examined the patient. I agree with the history, physical, assessment, and plan as documented above.     Go Jewell MD       No

## 2025-03-10 DIAGNOSIS — G70.00 MYASTHENIA GRAVIS WITHOUT EXACERBATION (H): ICD-10-CM

## 2025-03-11 RX ORDER — MYCOPHENOLATE MOFETIL 500 MG/1
1000 TABLET ORAL 2 TIMES DAILY
Qty: 360 TABLET | Refills: 0 | Status: SHIPPED | OUTPATIENT
Start: 2025-03-11

## 2025-03-22 ENCOUNTER — HEALTH MAINTENANCE LETTER (OUTPATIENT)
Age: 65
End: 2025-03-22

## 2025-06-08 DIAGNOSIS — G70.00 MYASTHENIA GRAVIS WITHOUT EXACERBATION (H): ICD-10-CM

## 2025-06-09 RX ORDER — MYCOPHENOLATE MOFETIL 500 MG/1
1000 TABLET ORAL 2 TIMES DAILY
Qty: 360 TABLET | Refills: 0 | Status: SHIPPED | OUTPATIENT
Start: 2025-06-09

## 2025-07-22 ENCOUNTER — OFFICE VISIT (OUTPATIENT)
Dept: NEUROLOGY | Facility: CLINIC | Age: 65
End: 2025-07-22
Payer: COMMERCIAL

## 2025-07-22 VITALS
TEMPERATURE: 97.8 F | HEART RATE: 56 BPM | OXYGEN SATURATION: 98 % | SYSTOLIC BLOOD PRESSURE: 109 MMHG | DIASTOLIC BLOOD PRESSURE: 68 MMHG | BODY MASS INDEX: 19.92 KG/M2 | WEIGHT: 127.2 LBS

## 2025-07-22 DIAGNOSIS — G70.00 MYASTHENIA GRAVIS WITHOUT EXACERBATION (H): Primary | ICD-10-CM

## 2025-07-22 ASSESSMENT — PAIN SCALES - GENERAL: PAINLEVEL_OUTOF10: NO PAIN (0)

## 2025-07-22 NOTE — PROGRESS NOTES
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 In August 2017 he developed drooping of the left eyelid. In August he developed diplopia. In 11/17 he developed limb weakness. No shortness of breath. In early 11/17 he started mestinon, which helped ptosis. In mid 11/17 he started prednisone: 10 mg daily and then increasing to 20 mg daily after about a week. After prednisone was started he reported less eyelid ptosis and overall improved strength - but not normal. He increased to 40 mg in early 2018. In 12/17 he also started mycophenolate. Prednisone was slowly tapered to 20 mg daily in 7/2018. By 9/1/18 he was at prednisone 10 mg daily. In 11/18 he developed some recurrence of diplopia and so we held at 10 mg for a couple months. Diplopia resolved. In the spring 2019 he reduced prednisone to 7.5 mg daily and developed diplopia. In 6/19 predniosne was increased back to 10 mg daily and mycophenolate to 2500 mg daily. In 12/19 he reduced pred to 10 alternating with 7.5 and in late 1/20 he reduced to 7.5 mg daily. In 3/20 reduced to 5 mg daily. Through 2020 he continued to slowly taper, by 12/20 he was down to prednisone 2.5 mg every other day, and later that month he stopped prednisone altogether. In 2/21 WBC counted noted to be 2.9. In 3/21 he reduced MMF to 1000 mg BID. WBC later in 3/21 4.2, in 6/21 it was 3.2 and 12/21 was 4.3. In 6/22 we reduced MMF to 1000/500 - he worsened (MG-ADL went from 0 to 4). MMF increase back to 1000 mg BID and he improved. In 2023, 2024 and 2025 he was in minimal manifestations or better.    Interval history:  I last saw him in clinic 1/21/25. He continues to do great. He is very active, riding about 500 miles per month on a road bike. He has not had any MG symptoms since we last met: no dysphagia, diplopia, ptosis, dysarthria, shortness of breath at rest or with exertion. His MMF remains 1000 mg BID. He tolerates  the medication well.     Prior pertinent laboratory work-up:  : AChR binding ab 0.7 nmol/L  (N < 0.4). TSH normal.   19: Normal CBC  3/11/2020 Normal CBC  : WBC 3.8  20: WBC 4.6  21: WBC 2.9  3/19/21: WBC 4.2.   21: WBC 3.2. Neutrophil 1.6 (Normal 1.7-7).   9/3/21: WBC 3.7, neutrophil 1.8   : Normal CBC  : CBC showed WBC 3.3. Otherwise normal CBC and BMP    Prior imagin/17: CT chest showed no evidence of thymoma. No mediastinal mass or lymphadenopathy.    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 Decrement was about 40% at the facial-nasalis.      Past Medical History:   Myasthenia gravis    Past Surgical History:  Sinus surgery    Family history:    There is no known family history of myopathy or other neuromuscular disorders.    Social History:    He denies tobacco, alcohol, or illicit drug use.     Medical Allergies:  NKDA     Current Medications:    Current Outpatient Medications   Medication Sig Dispense Refill    CELLCEPT (BRAND) 500 MG TABLET Take 2 tablets (1,000 mg) by mouth 2 times daily (Patient not taking: Reported on 3/10/2020) 120 tablet 3    mycophenolate (GENERIC EQUIVALENT) 500 MG tablet TAKE TWO TABLETS BY MOUTH TWICE DAILY 360 tablet 0    mycophenolate (GENERIC EQUIVALENT) 500 MG tablet TAKE TWO TABLETS BY MOUTH TWICE DAILY 360 tablet 0    mycophenolate (GENERIC EQUIVALENT) 500 MG tablet TAKE ONE TABLET IN THE MORNING AND 2 TABLETS AT NIGHT 90 tablet 2    predniSONE (DELTASONE) 5 MG tablet Take 5mg/2.5mg alternating days for 30 days. Then  2.5 mg daily for 30 days. Then 2.5mg/0 for 30 days. Then stop. (Patient not taking: Reported on 3/16/2021) 60 tablet 0    pyridostigmine (MESTINON) 60 MG tablet TAKE 1 TABLET BY MOUTH 5 TIMES DAILY (Patient not taking: Daily) 150 tablet 5     No current facility-administered medications for this  visit.     Review of Systems: A review of systems was obtained and was negative except for what was noted above.    Physical examination:    /68   Pulse 56   Temp 97.8  F (36.6  C) (Temporal)   Wt 57.7 kg (127 lb 3.2 oz)   SpO2 98%   BMI 19.92 kg/m      General Appearance: NAD    Skin: There are no rashes or other skin lesions.     Neurologic examination:     Mental status:  Patient is alert, attentive, and oriented x 3.  Language is coherent and fluent without aphasia.  Memory, comprehension and ability to follow commands were intact.        Cranial nerves: No ptosis or diplopia at baseline. Also no diplopia or ptosis after 60 seconds of upgaze. Eye closure strength is full. No dysarthria. Face strong and symmetric.      Motor exam: No atrophy or fasciculations.   Manual muscle testing revealed the following MRC grade muscle power:       Right Left   Neck flexion 5     Neck extension: 5     Shoulder abduction:        5 5   Elbow extension: 5 5   Elbow flexion:            5 5   Wrist flexion:            5 5   Wrist extension:        5 5   FDI 5 5   Hip flexion 5 5   Knee extension 5 5   Dorsiflexion 5 5   Plantar flexion 5 5      Complex motor skills: No tremor or ataxia     Sensory exam: Normal sensation in hands and feet    Gait narrow and stable.     Deep tendon reflexes:    Right Left   Triceps 2 2   Biceps 2 2   Brachioradialis 2 2   Knee jerk 2 2   Ankle jerk 2 2      MG Outcomes   MG ADL MG Composite MG medications   3/10/20 0 0 Prednisone 7.5 mg daily, MMF 1500 mg/1000 mg   7/14/20 0 0 Prednisone 5 mg daily, MMF 1500 mg/1000 mg   12/1/20 0 0 Prednisone 2.5 mg every other day, MMF 1500 mg/1000 mg   3/16/21 0 0 MMF 1500 mg/1000 mg   6/22/21   MMF 1000 mg BID   9/14/21 0 0 MMF 1000 mg BID   12/7/21 0 0 MMF 1000 mg BID    6/7/22 0 0 MMF 1000 mg BID   6/29/22 4 Not performed (phone) MMF 1000 mg/500 mg   12/6/22 0 0 MMF 1000 mg BID   7/11/23 0 0 MMF 1000 mg BID   1/16/24 0 0 MMF 1000 mg BID   7/16/24 0  1 MMF 1000 mg BID   1/21/25 0 1 MMF 1000 mg BID   7/22/25 0 0 MMF 1000 mg BID      Assessment:    David Vera is a 64 year old man with 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 He is completely free from MG symptoms. We last tried to back off on MMF about 3 years ago, which resulted in relapse. We discussed another small MMF reduction today. Understandably, he is reluctant to do this as each relapse is very disruptive to his active lifestyle, and prednisone rescue is not well tolerated. We agreed to make no MMF changes today. The winter may be a better time to try a small MMF reduction. When we next meet if he is doing well we may try a small MMF reduction to 1000/750 mg.     Plan:      1. Mestinon: No longer needed. Stopped 12/20.   2. Prednisone: No longer needed. Stopped 12/20.   3. Mycophenolate: Continue 1000 mg BID. No changes today. May reduce to 1000/750 mg at next visit with a goal to taper by no faster than 250 mg every 6 months.   4. Monitoring labs: None today  5. If he develops inability to swallow of shortness of breath he should seek urgent medical attention and then let me know. If he develops worsening ocular symptoms, dysarthria, or limb weakness he should let me know ASAP.   6. Follow up in 6 months. Sooner if needed.   -----

## 2025-07-22 NOTE — LETTER
7/22/2025       RE: David Vera  [address] [place] [address]     Dear Colleague,    Thank you for referring your patient, David Vera, to the  [facility] at [facility]. Please see a copy of my visit note below.    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 In August 2017 he developed drooping of the left eyelid. In August he developed diplopia. In 11/17 he developed limb weakness. No shortness of breath. In early 11/17 he started mestinon, which helped ptosis. In mid 11/17 he started prednisone: 10 mg daily and then increasing to 20 mg daily after about a week. After prednisone was started he reported less eyelid ptosis and overall improved strength - but not normal. He increased to 40 mg in early 2018. In 12/17 he also started mycophenolate. Prednisone was slowly tapered to 20 mg daily in 7/2018. By 9/1/18 he was at prednisone 10 mg daily. In 11/18 he developed some recurrence of diplopia and so we held at 10 mg for a couple months. Diplopia resolved. In the spring 2019 he reduced prednisone to 7.5 mg daily and developed diplopia. In 6/19 predniosne was increased back to 10 mg daily and mycophenolate to 2500 mg daily. In 12/19 he reduced pred to 10 alternating with 7.5 and in late 1/20 he reduced to 7.5 mg daily. In 3/20 reduced to 5 mg daily. Through 2020 he continued to slowly taper, by 12/20 he was down to prednisone 2.5 mg every other day, and later that month he stopped prednisone altogether. In 2/21 WBC counted noted to be 2.9. In 3/21 he reduced MMF to 1000 mg BID. WBC later in 3/21 4.2, in 6/21 it was 3.2 and 12/21 was 4.3. In 6/22 we reduced MMF to 1000/500 - he worsened (MG-ADL went from 0 to 4). MMF increase back to 1000 mg BID and he improved. In 2023, 2024 and 2025 he was in minimal manifestations or better.    Interval  history:  I last saw him in clinic 25. He continues to do great. He is very active, riding about 500 miles per month on a road bike. He has not had any MG symptoms since we last met: no dysphagia, diplopia, ptosis, dysarthria, shortness of breath at rest or with exertion. His MMF remains 1000 mg BID. He tolerates the medication well.     Prior pertinent laboratory work-up:  : AChR binding ab 0.7 nmol/L  (N < 0.4). TSH normal.   19: Normal CBC  3/11/2020 Normal CBC  : WBC 3.8  20: WBC 4.6  21: WBC 2.9  3/19/21: WBC 4.2.   21: WBC 3.2. Neutrophil 1.6 (Normal 1.7-7).   9/3/21: WBC 3.7, neutrophil 1.8   : Normal CBC  : CBC showed WBC 3.3. Otherwise normal CBC and BMP    Prior imagin/17: CT chest showed no evidence of thymoma. No mediastinal mass or lymphadenopathy.    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 Decrement was about 40% at the facial-nasalis.      Past Medical History:   Myasthenia gravis    Past Surgical History:  Sinus surgery    Family history:    There is no known family history of myopathy or other neuromuscular disorders.    Social History:    He denies tobacco, alcohol, or illicit drug use.     Medical Allergies:  NKDA     Current Medications:    Current Outpatient Medications   Medication Sig Dispense Refill     CELLCEPT (BRAND) 500 MG TABLET Take 2 tablets (1,000 mg) by mouth 2 times daily (Patient not taking: Reported on 3/10/2020) 120 tablet 3     mycophenolate (GENERIC EQUIVALENT) 500 MG tablet TAKE TWO TABLETS BY MOUTH TWICE DAILY 360 tablet 0     mycophenolate (GENERIC EQUIVALENT) 500 MG tablet TAKE TWO TABLETS BY MOUTH TWICE DAILY 360 tablet 0     mycophenolate (GENERIC EQUIVALENT) 500 MG tablet TAKE ONE TABLET IN THE MORNING AND 2 TABLETS AT NIGHT 90 tablet 2     predniSONE (DELTASONE) 5 MG tablet Take 5mg/2.5mg alternating  days for 30 days. Then  2.5 mg daily for 30 days. Then 2.5mg/0 for 30 days. Then stop. (Patient not taking: Reported on 3/16/2021) 60 tablet 0     pyridostigmine (MESTINON) 60 MG tablet TAKE 1 TABLET BY MOUTH 5 TIMES DAILY (Patient not taking: Daily) 150 tablet 5     No current facility-administered medications for this visit.     Review of Systems: A review of systems was obtained and was negative except for what was noted above.    Physical examination:    /68   Pulse 56   Temp 97.8  F (36.6  C) (Temporal)   Wt 57.7 kg (127 lb 3.2 oz)   SpO2 98%   BMI 19.92 kg/m      General Appearance: NAD    Skin: There are no rashes or other skin lesions.     Neurologic examination:     Mental status:  Patient is alert, attentive, and oriented x 3.  Language is coherent and fluent without aphasia.  Memory, comprehension and ability to follow commands were intact.        Cranial nerves: No ptosis or diplopia at baseline. Also no diplopia or ptosis after 60 seconds of upgaze. Eye closure strength is full. No dysarthria. Face strong and symmetric.      Motor exam: No atrophy or fasciculations.   Manual muscle testing revealed the following MRC grade muscle power:       Right Left   Neck flexion 5     Neck extension: 5     Shoulder abduction:        5 5   Elbow extension: 5 5   Elbow flexion:            5 5   Wrist flexion:            5 5   Wrist extension:        5 5   FDI 5 5   Hip flexion 5 5   Knee extension 5 5   Dorsiflexion 5 5   Plantar flexion 5 5      Complex motor skills: No tremor or ataxia     Sensory exam: Normal sensation in hands and feet    Gait narrow and stable.     Deep tendon reflexes:    Right Left   Triceps 2 2   Biceps 2 2   Brachioradialis 2 2   Knee jerk 2 2   Ankle jerk 2 2      MG Outcomes   MG ADL MG Composite MG medications   3/10/20 0 0 Prednisone 7.5 mg daily, MMF 1500 mg/1000 mg   7/14/20 0 0 Prednisone 5 mg daily, MMF 1500 mg/1000 mg   12/1/20 0 0 Prednisone 2.5 mg every other day, MMF  1500 mg/1000 mg   3/16/21 0 0 MMF 1500 mg/1000 mg   6/22/21   MMF 1000 mg BID   9/14/21 0 0 MMF 1000 mg BID   12/7/21 0 0 MMF 1000 mg BID    6/7/22 0 0 MMF 1000 mg BID   6/29/22 4 Not performed (phone) MMF 1000 mg/500 mg   12/6/22 0 0 MMF 1000 mg BID   7/11/23 0 0 MMF 1000 mg BID   1/16/24 0 0 MMF 1000 mg BID   7/16/24 0 1 MMF 1000 mg BID   1/21/25 0 1 MMF 1000 mg BID   7/22/25 0 0 MMF 1000 mg BID      Assessment:    David Vera is a 64 year old man with 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 He is completely free from MG symptoms. We last tried to back off on MMF about 3 years ago, which resulted in relapse. We discussed another small MMF reduction today. Understandably, he is reluctant to do this as each relapse is very disruptive to his active lifestyle, and prednisone rescue is not well tolerated. We agreed to make no MMF changes today. The winter may be a better time to try a small MMF reduction. When we next meet if he is doing well we may try a small MMF reduction to 1000/750 mg.     Plan:      1. Mestinon: No longer needed. Stopped 12/20.   2. Prednisone: No longer needed. Stopped 12/20.   3. Mycophenolate: Continue 1000 mg BID. No changes today. May reduce to 1000/750 mg at next visit with a goal to taper by no faster than 250 mg every 6 months.   4. Monitoring labs: None today  5. If he develops inability to swallow of shortness of breath he should seek urgent medical attention and then let me know. If he develops worsening ocular symptoms, dysarthria, or limb weakness he should let me know ASAP.   6. Follow up in 6 months. Sooner if needed.   -----          Again, thank you for allowing me to participate in the care of your patient.      Sincerely,    Go Jewell MD